# Patient Record
Sex: MALE | Race: WHITE | Employment: OTHER | URBAN - METROPOLITAN AREA
[De-identification: names, ages, dates, MRNs, and addresses within clinical notes are randomized per-mention and may not be internally consistent; named-entity substitution may affect disease eponyms.]

---

## 2017-07-20 ENCOUNTER — HOSPITAL ENCOUNTER (OUTPATIENT)
Dept: GENERAL RADIOLOGY | Age: 67
Discharge: HOME | End: 2017-07-20
Attending: FAMILY MEDICINE
Payer: MEDICARE

## 2017-07-20 DIAGNOSIS — M25.552 PAIN OF LEFT HIP JOINT: ICD-10-CM

## 2017-07-20 PROCEDURE — 73502 X-RAY EXAM HIP UNI 2-3 VIEWS: CPT | Performed by: FAMILY MEDICINE

## 2017-11-19 ENCOUNTER — APPOINTMENT (OUTPATIENT)
Dept: GENERAL RADIOLOGY | Age: 67
End: 2017-11-19
Attending: EMERGENCY MEDICINE
Payer: MEDICARE

## 2017-11-19 ENCOUNTER — HOSPITAL ENCOUNTER (EMERGENCY)
Age: 67
Discharge: HOME | End: 2017-11-19
Attending: EMERGENCY MEDICINE
Payer: MEDICARE

## 2017-11-19 VITALS
OXYGEN SATURATION: 97 % | DIASTOLIC BLOOD PRESSURE: 75 MMHG | SYSTOLIC BLOOD PRESSURE: 157 MMHG | BODY MASS INDEX: 31.83 KG/M2 | HEIGHT: 68 IN | RESPIRATION RATE: 16 BRPM | WEIGHT: 210 LBS | TEMPERATURE: 99 F | HEART RATE: 79 BPM

## 2017-11-19 DIAGNOSIS — S86.812A STRAIN OF CALF MUSCLE, LEFT, INITIAL ENCOUNTER: Primary | ICD-10-CM

## 2017-11-19 PROCEDURE — 73560 X-RAY EXAM OF KNEE 1 OR 2: CPT | Performed by: EMERGENCY MEDICINE

## 2017-11-19 PROCEDURE — 99283 EMERGENCY DEPT VISIT LOW MDM: CPT

## 2017-11-19 RX ORDER — TRAMADOL HYDROCHLORIDE 50 MG/1
50 TABLET ORAL EVERY 8 HOURS PRN
Qty: 15 TABLET | Refills: 0 | Status: SHIPPED | OUTPATIENT
Start: 2017-11-19 | End: 2018-11-07

## 2017-11-19 NOTE — ED PROVIDER NOTES
Patient Seen in: [facility] Emergency Department In [place]    History   Patient presents with:  Lower Extremity Injury (musculoskeletal)    Stated Complaint: l calf pain    HPI    Patient is a 33-year-old male who presents emergency room with history of Pulse 79   Temp 98.7 °F (37.1 °C) (Temporal)   Resp 16   Ht 172.7 cm (5' 8\")   Wt 95.3 kg   SpO2 97%   BMI 31.93 kg/m²         Physical Exam    GENERAL: Well-developed, well-nourished male sitting up breathing easily in no apparent distress.   Patient is Clinical Impression:  Strain of calf muscle, left, initial encounter  (primary encounter diagnosis)    Disposition:  Discharge  11/19/2017  1:40 pm    Follow-up:  Usman Pascal MD  [address] [place] [address]  [phone]

## 2017-12-11 ENCOUNTER — LAB ENCOUNTER (OUTPATIENT)
Dept: LAB | Age: 67
End: 2017-12-11
Attending: OTOLARYNGOLOGY
Payer: MEDICARE

## 2017-12-11 DIAGNOSIS — H90.5 CENTRAL HEARING LOSS: Primary | ICD-10-CM

## 2017-12-11 PROCEDURE — 84520 ASSAY OF UREA NITROGEN: CPT

## 2017-12-11 PROCEDURE — 82565 ASSAY OF CREATININE: CPT

## 2017-12-11 PROCEDURE — 36415 COLL VENOUS BLD VENIPUNCTURE: CPT

## 2017-12-19 ENCOUNTER — HOSPITAL ENCOUNTER (OUTPATIENT)
Dept: MRI IMAGING | Age: 67
Discharge: HOME | End: 2017-12-19
Attending: OTOLARYNGOLOGY
Payer: MEDICARE

## 2017-12-19 DIAGNOSIS — H90.3 SENSORINEURAL HEARING LOSS, ASYMMETRICAL: ICD-10-CM

## 2017-12-19 PROCEDURE — 70553 MRI BRAIN STEM W/O & W/DYE: CPT | Performed by: OTOLARYNGOLOGY

## 2017-12-19 PROCEDURE — A9575 INJ GADOTERATE MEGLUMI 0.1ML: HCPCS | Performed by: OTOLARYNGOLOGY

## 2018-01-19 ENCOUNTER — HOSPITAL ENCOUNTER (OUTPATIENT)
Dept: ULTRASOUND IMAGING | Age: 68
Discharge: HOME | End: 2018-01-19
Attending: FAMILY MEDICINE
Payer: MEDICARE

## 2018-01-19 ENCOUNTER — HOSPITAL ENCOUNTER (OUTPATIENT)
Dept: CV DIAGNOSTICS | Age: 68
Discharge: HOME | End: 2018-01-19
Attending: FAMILY MEDICINE
Payer: MEDICARE

## 2018-01-19 DIAGNOSIS — R55 SYNCOPE: ICD-10-CM

## 2018-01-19 PROCEDURE — 93880 EXTRACRANIAL BILAT STUDY: CPT | Performed by: FAMILY MEDICINE

## 2018-01-19 PROCEDURE — 93306 TTE W/DOPPLER COMPLETE: CPT | Performed by: FAMILY MEDICINE

## 2018-11-07 PROBLEM — M75.102 ROTATOR CUFF SYNDROME OF LEFT SHOULDER: Status: ACTIVE | Noted: 2018-11-07

## 2019-03-01 ENCOUNTER — APPOINTMENT (OUTPATIENT)
Dept: GENERAL RADIOLOGY | Age: 69
End: 2019-03-01
Attending: EMERGENCY MEDICINE
Payer: MEDICARE

## 2019-03-01 ENCOUNTER — HOSPITAL ENCOUNTER (EMERGENCY)
Age: 69
Discharge: HOME | End: 2019-03-01
Attending: EMERGENCY MEDICINE
Payer: MEDICARE

## 2019-03-01 VITALS
BODY MASS INDEX: 30 KG/M2 | SYSTOLIC BLOOD PRESSURE: 154 MMHG | WEIGHT: 209.44 LBS | OXYGEN SATURATION: 98 % | HEART RATE: 76 BPM | DIASTOLIC BLOOD PRESSURE: 69 MMHG | TEMPERATURE: 98 F | RESPIRATION RATE: 16 BRPM

## 2019-03-01 DIAGNOSIS — M10.9 ACUTE GOUT INVOLVING TOE OF RIGHT FOOT, UNSPECIFIED CAUSE: Primary | ICD-10-CM

## 2019-03-01 PROCEDURE — 73630 X-RAY EXAM OF FOOT: CPT | Performed by: EMERGENCY MEDICINE

## 2019-03-01 PROCEDURE — 99283 EMERGENCY DEPT VISIT LOW MDM: CPT | Performed by: EMERGENCY MEDICINE

## 2019-03-01 RX ORDER — PREDNISONE 20 MG/1
40 TABLET ORAL DAILY
Qty: 10 TABLET | Refills: 0 | Status: SHIPPED | OUTPATIENT
Start: 2019-03-01 | End: 2019-03-06

## 2019-03-01 RX ORDER — IBUPROFEN 600 MG/1
600 TABLET ORAL EVERY 8 HOURS PRN
Qty: 30 TABLET | Refills: 0 | Status: SHIPPED | OUTPATIENT
Start: 2019-03-01 | End: 2019-03-11

## 2019-03-01 NOTE — ED PROVIDER NOTES
Patient Seen in: [facility] In [place]    History   Patient presents with:  Lower Extremity Injury (musculoskeletal)    Stated Complaint: right toe and foot pain onset Monday with swelling    HPI    64-year-old male presents emergency scleral icterus, mucous membranes are moist, there is no erythema or exudate in the posterior pharynx  Neck: Supple no JVD no lymphadenopathy no meningismus no carotid bruit  CV: Regular rate and rhythm no murmur rub  Respiratory: Clear to auscultation rob R-0

## 2019-03-29 ENCOUNTER — OFFICE VISIT (OUTPATIENT)
Dept: INTERNAL MEDICINE CLINIC | Facility: CLINIC | Age: 69
End: 2019-03-29
Payer: MEDICARE

## 2019-03-29 ENCOUNTER — LAB ENCOUNTER (OUTPATIENT)
Dept: LAB | Age: 69
End: 2019-03-29
Attending: INTERNAL MEDICINE
Payer: MEDICARE

## 2019-03-29 VITALS
SYSTOLIC BLOOD PRESSURE: 130 MMHG | RESPIRATION RATE: 16 BRPM | TEMPERATURE: 98 F | HEIGHT: 67.25 IN | WEIGHT: 217.25 LBS | DIASTOLIC BLOOD PRESSURE: 68 MMHG | BODY MASS INDEX: 33.7 KG/M2 | HEART RATE: 78 BPM

## 2019-03-29 DIAGNOSIS — D64.9 ANEMIA, UNSPECIFIED TYPE: ICD-10-CM

## 2019-03-29 DIAGNOSIS — Z87.891 HISTORY OF SMOKING 25-50 PACK YEARS: ICD-10-CM

## 2019-03-29 DIAGNOSIS — Z12.11 SCREENING FOR MALIGNANT NEOPLASM OF COLON: ICD-10-CM

## 2019-03-29 DIAGNOSIS — D64.9 ANEMIA, UNSPECIFIED TYPE: Primary | ICD-10-CM

## 2019-03-29 LAB
BASOPHILS # BLD AUTO: 0.03 X10(3) UL (ref 0–0.2)
BASOPHILS NFR BLD AUTO: 0.4 %
DEPRECATED RDW RBC AUTO: 58.5 FL (ref 35.1–46.3)
EOSINOPHIL # BLD AUTO: 0.18 X10(3) UL (ref 0–0.7)
EOSINOPHIL NFR BLD AUTO: 2.1 %
ERYTHROCYTE [DISTWIDTH] IN BLOOD BY AUTOMATED COUNT: 17.4 % (ref 11–15)
HCT VFR BLD AUTO: 38.7 % (ref 39–53)
HGB BLD-MCNC: 13.7 G/DL (ref 13–17.5)
IMM GRANULOCYTES # BLD AUTO: 0.07 X10(3) UL (ref 0–1)
IMM GRANULOCYTES NFR BLD: 0.8 %
LYMPHOCYTES # BLD AUTO: 2.25 X10(3) UL (ref 1–4)
LYMPHOCYTES NFR BLD AUTO: 26.4 %
MCH RBC QN AUTO: 33 PG (ref 26–34)
MCHC RBC AUTO-ENTMCNC: 35.4 G/DL (ref 31–37)
MCV RBC AUTO: 93.3 FL (ref 80–100)
MONOCYTES # BLD AUTO: 0.7 X10(3) UL (ref 0.1–1)
MONOCYTES NFR BLD AUTO: 8.2 %
NEUTROPHILS # BLD AUTO: 5.3 X10 (3) UL (ref 1.5–7.7)
NEUTROPHILS # BLD AUTO: 5.3 X10(3) UL (ref 1.5–7.7)
NEUTROPHILS NFR BLD AUTO: 62.1 %
PLATELET # BLD AUTO: 233 10(3)UL (ref 150–450)
RBC # BLD AUTO: 4.15 X10(6)UL (ref 3.8–5.8)
WBC # BLD AUTO: 8.5 X10(3) UL (ref 4–11)

## 2019-03-29 PROCEDURE — 99203 OFFICE O/P NEW LOW 30 MIN: CPT | Performed by: INTERNAL MEDICINE

## 2019-03-29 PROCEDURE — 36415 COLL VENOUS BLD VENIPUNCTURE: CPT

## 2019-03-29 PROCEDURE — 85025 COMPLETE CBC W/AUTO DIFF WBC: CPT

## 2019-03-29 NOTE — PROGRESS NOTES
[facility] Internal Medicine Office Note  Chief Complaint:   Patient presents with:  Establish Care  Shoulder Pain: L shoulder pain       HPI:   This is a 77year old male coming in for establishing care  HPI  His old primary doctor was a geriatr Outpatient Medications:  Multiple Vitamins-Minerals (PRESERVISION AREDS 2+MULTI VIT OR) PreserVision AREDS Disp:  Rfl:          REVIEW OF SYSTEMS:   Review of Systems   Constitutional: Negative for fever. Respiratory: Negative for shortness of breath. appt or drop off at office.   -     CBC WITH DIFFERENTIAL WITH PLATELET;  Future    Screening for malignant neoplasm of colon -last colonoscopy was a few years ago but he thinks he might be coming due for follow up due to polyps   -     GASTRO - INTERNAL

## 2019-03-29 NOTE — PATIENT INSTRUCTIONS
Blood work     Prevnar pneumonia vaccine recommended (then Pneumovax pneumonia is recommended a year later)     Shingrix shingles vaccine recommended - 2nd dose is 2-6 months after the first dose     Consider lung CT for screening for lung cancer

## 2019-06-04 ENCOUNTER — TELEPHONE (OUTPATIENT)
Dept: INTERNAL MEDICINE CLINIC | Facility: CLINIC | Age: 69
End: 2019-06-04

## 2019-06-05 ENCOUNTER — HOSPITAL ENCOUNTER (OUTPATIENT)
Dept: CT IMAGING | Age: 69
Discharge: HOME | End: 2019-06-05
Attending: INTERNAL MEDICINE
Payer: MEDICARE

## 2019-06-05 DIAGNOSIS — Z87.891 HISTORY OF SMOKING 25-50 PACK YEARS: ICD-10-CM

## 2019-07-11 ENCOUNTER — PATIENT MESSAGE (OUTPATIENT)
Dept: INTERNAL MEDICINE CLINIC | Facility: CLINIC | Age: 69
End: 2019-07-11

## 2019-07-18 ENCOUNTER — OFFICE VISIT (OUTPATIENT)
Dept: INTERNAL MEDICINE CLINIC | Facility: CLINIC | Age: 69
End: 2019-07-18
Payer: MEDICARE

## 2019-07-18 VITALS
SYSTOLIC BLOOD PRESSURE: 130 MMHG | DIASTOLIC BLOOD PRESSURE: 72 MMHG | TEMPERATURE: 98 F | RESPIRATION RATE: 16 BRPM | HEIGHT: 67.25 IN | WEIGHT: 217 LBS | OXYGEN SATURATION: 98 % | BODY MASS INDEX: 33.66 KG/M2 | HEART RATE: 75 BPM

## 2019-07-18 DIAGNOSIS — R42 VERTIGO: ICD-10-CM

## 2019-07-18 DIAGNOSIS — R73.01 ELEVATED FASTING GLUCOSE: Primary | ICD-10-CM

## 2019-07-18 PROBLEM — I73.9 PVD (PERIPHERAL VASCULAR DISEASE) (HCC): Status: ACTIVE | Noted: 2018-02-02

## 2019-07-18 PROBLEM — Z95.0 PRESENCE OF CARDIAC PACEMAKER: Status: ACTIVE | Noted: 2018-02-14

## 2019-07-18 PROBLEM — I77.9 CAROTID DISEASE, BILATERAL (HCC): Status: ACTIVE | Noted: 2018-02-02

## 2019-07-18 PROBLEM — I73.9 PVD (PERIPHERAL VASCULAR DISEASE): Status: ACTIVE | Noted: 2018-02-02

## 2019-07-18 PROBLEM — I45.2 RBBB (RIGHT BUNDLE BRANCH BLOCK WITH LEFT ANTERIOR FASCICULAR BLOCK): Status: ACTIVE | Noted: 2018-02-02

## 2019-07-18 PROBLEM — I77.9 CAROTID DISEASE, BILATERAL: Status: ACTIVE | Noted: 2018-02-02

## 2019-07-18 PROBLEM — H81.09 MENIERE'S DISEASE: Status: ACTIVE | Noted: 2018-09-01

## 2019-07-18 LAB
CARTRIDGE LOT#: 219 NUMERIC
HEMOGLOBIN A1C: 6 % (ref 4.3–5.6)

## 2019-07-18 PROCEDURE — 83036 HEMOGLOBIN GLYCOSYLATED A1C: CPT | Performed by: INTERNAL MEDICINE

## 2019-07-18 PROCEDURE — 99213 OFFICE O/P EST LOW 20 MIN: CPT | Performed by: INTERNAL MEDICINE

## 2019-07-18 NOTE — PATIENT INSTRUCTIONS
Call today to make appointment with [facility] specialist Dr. Shaista Engle in [place] [phone] [address]  [place]  [address]      Low carb diet: decrease bread, rice, pasta, potatoes, juice, soda, alcohol, crack

## 2019-07-18 NOTE — PROGRESS NOTES
[facility] Internal Medicine Office Note  Chief Complaint:   Patient presents with:  Vertigo: patient c/o vertigo on and off x 3 months, c/o vomiting        HPI:   This is a 68year old male coming in for vertigo   HPI  Lasts for 1-2 hours  7 eps Cardiovascular: Negative for chest pain. Gastrointestinal: Negative for constipation. Neurological: Positive for dizziness. Hematological: Negative. Psychiatric/Behavioral: Negative.          EXAM:   /72   Pulse 75   Temp 98.2 °F (36.8 °C) Outcome: Patient verbalizes understanding. Patient is notified to call with any questions, complications, allergies, or worsening or changing symptoms. Patient is to call with any side effects or complications from the treatments as a result of today.

## 2019-09-11 ENCOUNTER — LAB ENCOUNTER (OUTPATIENT)
Dept: LAB | Age: 69
End: 2019-09-11
Attending: OTOLARYNGOLOGY
Payer: MEDICARE

## 2019-09-11 DIAGNOSIS — H81.02 MENIERE'S DISEASE OF LEFT EAR: Primary | ICD-10-CM

## 2019-09-11 LAB
ANION GAP SERPL CALC-SCNC: 6 MMOL/L (ref 0–18)
BUN BLD-MCNC: 24 MG/DL (ref 7–18)
BUN/CREAT SERPL: 18 (ref 10–20)
CALCIUM BLD-MCNC: 8.7 MG/DL (ref 8.5–10.1)
CHLORIDE SERPL-SCNC: 102 MMOL/L (ref 98–112)
CO2 SERPL-SCNC: 33 MMOL/L (ref 21–32)
CREAT BLD-MCNC: 1.33 MG/DL (ref 0.7–1.3)
GLUCOSE BLD-MCNC: 169 MG/DL (ref 70–99)
OSMOLALITY SERPL CALC.SUM OF ELEC: 300 MOSM/KG (ref 275–295)
POTASSIUM SERPL-SCNC: 3.8 MMOL/L (ref 3.5–5.1)
SODIUM SERPL-SCNC: 141 MMOL/L (ref 136–145)

## 2019-09-11 PROCEDURE — 36415 COLL VENOUS BLD VENIPUNCTURE: CPT

## 2019-09-11 PROCEDURE — 80048 BASIC METABOLIC PNL TOTAL CA: CPT

## 2019-09-17 ENCOUNTER — TELEPHONE (OUTPATIENT)
Dept: INTERNAL MEDICINE CLINIC | Facility: CLINIC | Age: 69
End: 2019-09-17

## 2019-09-17 NOTE — TELEPHONE ENCOUNTER
Patient outreach spoke to patient and he states he will call by end of the week; he was wanting to call to discuss lab results that concern him from his ENT.  Informed him to call to schedule AMW 2019

## 2019-09-23 ENCOUNTER — PATIENT MESSAGE (OUTPATIENT)
Dept: INTERNAL MEDICINE CLINIC | Facility: CLINIC | Age: 69
End: 2019-09-23

## 2019-09-23 DIAGNOSIS — N17.9 ACUTE KIDNEY INJURY (HCC): Primary | ICD-10-CM

## 2019-10-04 ENCOUNTER — APPOINTMENT (OUTPATIENT)
Dept: LAB | Age: 69
End: 2019-10-04
Attending: INTERNAL MEDICINE
Payer: MEDICARE

## 2019-10-04 DIAGNOSIS — N17.9 ACUTE KIDNEY INJURY (HCC): ICD-10-CM

## 2019-10-04 PROCEDURE — 80048 BASIC METABOLIC PNL TOTAL CA: CPT

## 2019-10-04 PROCEDURE — 36415 COLL VENOUS BLD VENIPUNCTURE: CPT

## 2019-10-08 ENCOUNTER — OFFICE VISIT (OUTPATIENT)
Dept: INTERNAL MEDICINE CLINIC | Facility: CLINIC | Age: 69
End: 2019-10-08
Payer: MEDICARE

## 2019-10-08 VITALS
HEIGHT: 67.25 IN | DIASTOLIC BLOOD PRESSURE: 62 MMHG | SYSTOLIC BLOOD PRESSURE: 112 MMHG | WEIGHT: 206.25 LBS | HEART RATE: 80 BPM | TEMPERATURE: 98 F | RESPIRATION RATE: 16 BRPM | BODY MASS INDEX: 31.99 KG/M2

## 2019-10-08 DIAGNOSIS — Z00.00 WELLNESS EXAMINATION: Primary | ICD-10-CM

## 2019-10-08 DIAGNOSIS — R42 VERTIGO: ICD-10-CM

## 2019-10-08 DIAGNOSIS — Z12.5 SCREENING FOR PROSTATE CANCER: ICD-10-CM

## 2019-10-08 DIAGNOSIS — R73.01 ELEVATED FASTING GLUCOSE: ICD-10-CM

## 2019-10-08 DIAGNOSIS — Z51.81 ENCOUNTER FOR MEDICATION MONITORING: ICD-10-CM

## 2019-10-08 PROCEDURE — G0439 PPPS, SUBSEQ VISIT: HCPCS | Performed by: INTERNAL MEDICINE

## 2019-10-08 RX ORDER — CHLORHEXIDINE GLUCONATE 0.12 MG/ML
RINSE ORAL
COMMUNITY
End: 2020-06-18 | Stop reason: ALTCHOICE

## 2019-10-08 RX ORDER — ANTIOX #8/OM3/DHA/EPA/LUT/ZEAX 250-2.5 MG
CAPSULE ORAL
COMMUNITY
End: 2020-02-06

## 2019-10-08 RX ORDER — TRIAMTERENE AND HYDROCHLOROTHIAZIDE 37.5; 25 MG/1; MG/1
CAPSULE ORAL
COMMUNITY
End: 2020-11-13

## 2019-10-08 NOTE — PROGRESS NOTES
REASON FOR VISIT:    Mona Su is a 68year old male who presents for a Medicare Annual Wellness visit. Vertigo - improved with triamterene-hctz.  No episodes in the month of Oct  He has constant tinnitus which is bothersome     He has been feeling 63 U/L 28 30             General Health      In the past six months, have you lost more than 10 pounds without trying?: (P) 2 - No  Has your appetite been poor?: (P) Yes  Type of Diet: (P) Balanced  How does the patient maintain a good energy level?: Daily all  Feeling down, depressed, or hopeless (over the last two weeks)?: Not at all  PHQ-2 SCORE: 0        Advance Directives     Do you have a healthcare power of ?: (P) Yes  Do you have a living will?: (P) Yes     Cognitive Assessment     What day o DENTAL SURGERY PROCEDURE  2009    dental implants   • HERNIA SURGERY Right 1973   • HERNIA SURGERY Bilateral 2001   • LASIK Bilateral 2003   • REPAIR ING HERNIA,5+Y/O,REDUCIBL     • REPAIR OF NASAL SEPTUM N/A 2003      History reviewed.  No pertinent family lesions  HEENT: atraumatic, normocephalic, ears and throat are clear                Hearing Assessed via: Finger Rub  EYES:  conjunctiva are clear   CHEST: no chest tenderness  LUNGS: clear to auscultation  CARDIO: RRR without murmur  GI: good BS's, no mas

## 2019-10-25 ENCOUNTER — APPOINTMENT (OUTPATIENT)
Dept: LAB | Age: 69
End: 2019-10-25
Attending: INTERNAL MEDICINE
Payer: MEDICARE

## 2019-10-25 DIAGNOSIS — Z51.81 ENCOUNTER FOR MEDICATION MONITORING: ICD-10-CM

## 2019-10-25 DIAGNOSIS — Z12.5 SCREENING FOR PROSTATE CANCER: ICD-10-CM

## 2019-10-25 DIAGNOSIS — R73.01 ELEVATED FASTING GLUCOSE: ICD-10-CM

## 2019-10-25 PROCEDURE — 36415 COLL VENOUS BLD VENIPUNCTURE: CPT

## 2019-10-25 PROCEDURE — 80048 BASIC METABOLIC PNL TOTAL CA: CPT

## 2019-10-25 PROCEDURE — 83036 HEMOGLOBIN GLYCOSYLATED A1C: CPT

## 2019-10-28 DIAGNOSIS — N17.9 ACUTE KIDNEY INJURY (HCC): Primary | ICD-10-CM

## 2019-11-07 ENCOUNTER — APPOINTMENT (OUTPATIENT)
Dept: LAB | Age: 69
End: 2019-11-07
Attending: INTERNAL MEDICINE
Payer: MEDICARE

## 2019-11-07 DIAGNOSIS — N17.9 ACUTE KIDNEY INJURY (HCC): ICD-10-CM

## 2019-11-07 DIAGNOSIS — N17.9 ACUTE KIDNEY INJURY (HCC): Primary | ICD-10-CM

## 2019-11-07 PROCEDURE — 36415 COLL VENOUS BLD VENIPUNCTURE: CPT

## 2019-11-07 PROCEDURE — 80048 BASIC METABOLIC PNL TOTAL CA: CPT

## 2020-01-29 ENCOUNTER — PATIENT MESSAGE (OUTPATIENT)
Dept: INTERNAL MEDICINE CLINIC | Facility: CLINIC | Age: 70
End: 2020-01-29

## 2020-01-29 DIAGNOSIS — R73.01 ELEVATED FASTING GLUCOSE: Primary | ICD-10-CM

## 2020-01-29 NOTE — TELEPHONE ENCOUNTER
**See Addendum**
General Information and HPI
 
Consulting Request
Date of Consult: 09/11/18
Requested By:
Neftaly Eddy MD
 
Reason for Consult:
lightheadedness
Source of Information: patient, old records, ED physician
Exam Limitations: no limitations
History of Present Illness:
73-year-old male past medical history coronary artery disease status post CABG 
4 (1989), ischemic cardiomyopathy (LVEF 25-30% 8/27/2018) s/p Medtronic ICD with
no history of ICD shocks last interrogation 8/30/2018 showing no ventricular 
arrhythmias, mild to moderate aortic stenosis, significant peripheral vascular 
disease with AAA status post EVAR, carotid stenosis s/p right ICA CEA with total
occlusion of left ICA, two stents in right groin, HTN, HLD, COPD, squamous cell 
lung cancer s/p radiation and recently completed course of prednisone, prior b/l
pleural effusions, hypothyroidism presents after episode of sudden onset 
lightheadedness.  Patient reports that he had just gotten into the shower when 
he became suddenly lightheaded.  He denies associated chest pain, palpitations, 
dyspnea.  He denies fall or loss of consciousness.  The symptoms were transient 
and resolved and the patient sat down.  He did report a discomfort in his left 
shoulder and a "fluttering feeling in his neck."  The patient is a very minimal 
exercise tolerance at baseline, can cross her room and become short of breath, 
however this is unchanged.  He denies worsening orthopnea, PND, reports that 
previous lower extremity swelling has improved.  At the time of this history the
patient denies symptoms while at rest.
 
The patient is compliant with all medications.  He also noted that on 2 
occasions, including this morning, when he raises his arms above his head he 
feels that both of his lower extremities become numb.  The symptoms have 
resolved by the time of this history.
 
Allergies/Medications
Allergies:
Coded Allergies:
No Known Allergies (09/08/18)
 
Home Med List:
Acetaminophen 325 MG CAPSULE   1 CAP PO PRN PAIN  (Reported)
Albuterol Sulfate (Proair Hfa) 90 MCG HFA.AER.AD   2 PUF INH Q4 COPD
Aspirin (Aspirin*) 81 MG TAB.CHEW   1 TAB PO DAILY heart  (Reported)
Atorvastatin Calcium (Lipitor) 40 MG TABLET   1 TAB PO DAILY cholesterol  (
Reported)
Budesonide/Formoterol Fumarate (Symbicort 160-4.5 Mcg Inhaler) 160 MCG-4.5 MCG/
ACTUATION HFA.AER.AD   2 PUF INH BID COPD  (Reported)
Carvedilol 6.25 MG TABLET   1 TAB PO BID HEART/BP  (Reported)
Clopidogrel Bisulfate (Clopidogrel) 75 MG TABLET   1 TAB PO DAILY BLOOD THINNER 
(Reported)
Empagliflozin (Jardiance) 10 MG TABLET   1 TAB PO DAILY DM  (Reported)
Furosemide (Lasix) 20 MG TABLET   3 TAB PO BID HEART  (Reported)
     .
Insulin Aspart, Recombinant (Novolog Flexpen) 100 UNIT/ML INSULN.PEN   0 SC SEE 
ADMIN CRITERIA DM
     PLEASE check your blood sugar 3 times daily before meals and at
     bedtime and .
     FOLLOW
     80 - 150 MG/DL   9 UNITS
     151- 200 MG/DL   11 UNITS
     201- 250 MG/DL   13 UNITS
     251- 300 MG/DL   15 UNITS
     301- 350 MG/DL   17 UNITS
     351 - 400MG/DL   19 UNITS
     >400 MG/DL       20 UNITS AND CALL YOUR DR.
Insulin Detemir (Levemir) 100 UNIT/ML VIAL   25 UNITS SC QAM DM  (Reported)
Levothyroxine Sodium 100 MCG TABLET   1.5 TAB PO DAILY HYPOTHYROID  (Reported)
Nitroglycerin (Nitrostat) 0.4 MG TAB.SUBL   1 TAB SL AD PRN CHEST PAIN  (
Reported)
     1st sign of attack; may repeat every 5 minutes until relief; if pain 
persists after 3 tablets in 15 minutes, prompt medical att
Omeprazole 40 MG CAPSULE.DR   1 CAP PO DAILY GI  (Reported)
Ranolazine (Ranexa) 500 MG TAB.ER.12H   1 TAB PO BID heart  (Reported)
Sertraline HCl 50 MG TABLET   1 TAB PO DAILY MENTAL HEALTH  (Reported)
Tamsulosin HCl (Flomax) 0.4 MG CAP.ER.24H   1 CAP PO QHS prostate
     Please take at bed time to avoid low blood pressure during the day.
Tiotropium Bromide (Spiriva) 18 MCG CAP.W.DEV   1 CAP INH DAILY COPD  (Reported)
 
Current Medications:
 Current Medications
 
 
  Sig/Ly Start time  Last
 
Medication Dose Route Stop Time Status Admin
 
Aspirin 0 .STK-MED ONE 09/11 0652 DC 
 
  PO   
 
Aspirin 325 MG ONCE ONE 09/11 0645 DC 09/11
 
  PO 09/11 0646  0652
 
 
 
 
Review of Systems
Review of Systems:
As per HPI.  Otherwise a 10 point review systems was negative.
 
Past History
 
Travel History
Traveled to [place] past 21 day No
 
Medical History
Neurological: NONE
EENT: NONE
Cardiovascular: CARDIAC STENTS CABG CAROTID ARTERY BYPASS DEFIBRILLATOR
Respiratory: COPD
Gastrointestinal: NONE
Hepatic: NONE
Renal: NONE
Musculoskeletal: NONE
Psychiatric: NONE
Endocrine: hypothyroidism, INSULIN DEP DIABETIC
Blood Disorders: DVT
Cancer(s): lung cancer
GYN/Reproductive: NONE
 
Surgical History
Surgical History: CABG, CAROTID ARTECTOMY nerve thermoablation to reduce back 
pain 
 
Family History
Relations & Conditions If Any:
Relation not specified for:
  *No pertinent family history
 
 
Psychosocial History
Who Do You Live With? spouse
Services at Home: None
Primary Language: English
Living Will? no
 
Functional Ability
ADLs
Independent: dressing, eating, toileting, bathing. 
Ambulation: independent
 
ECHO Results (as available)
Report:
Left ventricular cavity size normal. Left ventricular wall thickness
 mildly increased. Moderate to severe hypokinesis of the mid to basal
 inferior wall. Mild distal anteroseptal/anteroapical hypokinesis.
 Left ventricular ejection fraction is estimated at 45 %.
 Catheter/pacemaker wire in the right ventricular cavity. Normal
 right ventricular size and function.
 Mild left atrial dilatation.
 Moderate mitral regurgitation.
 Mild-to-moderate aortic stenosis (MICHELLE 1.4 cm2 by continuity with a
 mean gradient of 13 mmHg).
 Moderate tricuspid regurgitation. RVSP > 45 mmHg.
 
 
Exam & Diagnostic Data
Vital Signs and I&O
Vital Signs
 
 
Date Time Temp Pulse Resp B/P B/P Pulse O2 O2 Flow FiO2
 
     Mean Ox Delivery Rate 
 
09/11 1253    109/64     
 
09/11 1251    112/64     
 
09/11 1249 97.9 92 18 119/67  97 Room Air  
 
09/11 1139 97.9 96 18 124/57  95 Room Air  
 
09/11 0853 97.8 88 18 115/67  97 Room Air  
 
09/11 0741       Room Air  
 
09/11 0635  89 22 106/70  97 Room Air  
 
 
 Intake & Output
 
 
 09/11 1600 09/11 0800 09/11 0000 09/10 1600 09/10 0800 09/10 0000
 
Intake Total      
 
Output Total      
 
Balance      
 
       
 
Patient  104.326 kg    
 
Weight      
 
Weight  Reported by Patient    
 
Measurement      
 
Method      
 
 
 
Physical Exam:
General: elderly male, lying in stretcher, no apparent distress
HEENT: NCAT, NO JVD, difficult to appreciate carotid pulsations
Heart: distant heart sounds, s1s2, RRR, 2/6 systolic murmur at the base
Lungs: mild decreased bibasilar breath sounds, fair air entry b/l, scattered 
faint exp wheezes, no rales/rhonchi
Chest: ICD in place
Abd: soft, nt
Ext: trace b/l LE pitting edema to mid shins, chronic bilateral skin lower 
extremity changes
Labs/K[place]il Results:
 Laboratory Tests
 
 
 09/11
 
 0644
 
Chemistry 
 
  Sodium (137 - 145 mmol/L) 139
 
  Potassium (3.5 - 5.1 mmol/L) 3.8
 
  Chloride (98 - 107 mmol/L) 101
 
  Carbon Dioxide (22 - 30 mmol/L) 30
 
  Anion Gap (5 - 16) 8
 
  BUN (9 - 20 mg/dL) 27  H
 
  Creatinine (0.7 - 1.2 mg/dL) 1.7  H
 
  Estimated GFR (>60 ml/min) 39  L
 
  BUN/Creatinine Ratio (7 - 25 %) 15.9
 
  Glucose (65 - 99 mg/dL) 168  H
 
  Calcium (8.4 - 10.2 mg/dL) 9.0
 
  Magnesium (1.6 - 2.3 mg/dL) 1.6
 
  Total Bilirubin (0.2 - 1.3 mg/dL) 0.3
 
  AST (17 - 59 U/L) 23
 
  ALT (21 - 72 U/L) 42
 
  Alkaline Phosphatase (< 127 U/L) 126
 
  Troponin I (<0.11 ng/ml) 0.07
 
  Total Protein (6.3 - 8.2 g/dL) 5.4  L
 
  Albumin (3.5 - 5.0 g/dL) 3.2  L
 
  Globulin (1.9 - 4.2 gm/dL) 2.2
 
  Albumin/Globulin Ratio (1.1 - 2.2 %) 1.5
 
  Cholesterol (< 200 MG/DL) 141
 
Coagulation 
 
  PT (9.4 - 12.5 SEC) 12.5
 
  INR (0.90 - 1.17) 1.15
 
Hematology 
 
  CBC w Diff NO MAN DIFF REQ
 
  WBC (4.8 - 10.8 /CUMM) 5.8
 
  RBC (4.70 - 6.10 /CUMM) 3.33  L
 
  Hgb (14.0 - 18.0 G/DL) 11.4  L
 
  Hct (42 - 52 %) 33.5  L
 
  MCV (80.0 - 94.0 FL) 100.7  H
 
  MCH (27.0 - 31.0 PG) 34.3  H
 
  MCHC (33.0 - 37.0 G/DL) 34.1
 
  RDW (11.5 - 14.5 %) 15.6  H
 
  Plt Count (130 - 400 /CUMM) 134
 
  MPV (7.4 - 10.4 FL) 7.8
 
  Gran % (42.2 - 75.2 %) 75.4  H
 
  Lymphocytes % (20.5 - 51.1 %) 11.1  L
 
  Monocytes % (1.7 - 9.3 %) 11.1  H
 
  Eosinophils % (0 - 5 %) 2.4
 
  Basophils % (0.0 - 2.0 %) 0
 
  Absolute Granulocytes (1.4 - 6.5 /CUMM) 4.3
 
  Absolute Lymphocytes (1.2 - 3.4 /CUMM) 0.6  L
 
  Absolute Monocytes (0.10 - 0.60 /CUMM) 0.6
 
  Absolute Eosinophils (0.0 - 0.7 /CUMM) 0.1
 
  Absolute Basophils (0.0 - 0.2 /CUMM) 0
 
 
 
 
Diagnostic Data
EKG Results
Personally reviewed
sinus rhythm w PVC, nonspecific IVCD, nonspecific Tw abnormalities
 
grossly unchanged from previous tracing
CXR Results
Evidence of previous cardiac surgery. AICD in place grossly stable.
Progressive airspace disease left base consistent with presumably
pneumonitis. Underlying layering pleural effusion suspected. Hazy density
right midlung laterally favor summation shadow. No ectopic air. No overt CHF.
Heart size remains mildly enlarged.
Other Results
CT chest
1. Patient's known left lower lobe cancer is unchanged when compared to most
recent prior exam.
2. Interval slight decrease in size of the left-sided pleural effusion and
associated volume loss and presumed atelectatic changes in the left lower
lobe and lingula.
3. Right-sided pleural effusion has completely resolved. Background of mild
emphysema remains.
4. Distal thoracic and abdominal aortic aneurysm, unchanged. Abdominal aortic
stent graft is partially imaged.
5. Severe atherosclerotic coronary artery calcification. Patient is status
post median sternotomy and CABG surgery.
6. Right renal cysts.
 
TTE 8/27/2018
 Limited study. Moderate to severely reduced left ventricular systolic function.
Mild left ventricular dilatation. Left ventricular wall thickness  
 mildly increased. Moderately abnormal left ventricular ejection  fraction 
estimated at 25-30%. Akinetic inferior wall.  Moderate Aortic stenosis. 
 
Assessment/Plan
Assessment/Plan
1. lightheadedness, transient
2. CAD s/p CABG x 4 (1989)
3. Ischemic cardiomyopathy (LVEF 25-30% 8/2018) s/p Medtronic single chamber ICD
4. PAD w AAA s/p EVAR, b/l carotid stenosis s/p R CEA and L ICA total occlusion 
5. mild-moderate aortic stenosis
6. HTN
7. HLD
8. COPD, severe
9. DM
10. hypothyroidism
11. squamous cell lung cancer
12. pleural effuions, resolution of right pleural effion and decrease in left 
pleural effusion
13. CKD, creatinine at baseline
 
Patient's presentation is atypical for ACS.  BP is stable.  The patient does not
appear to be in acute heart failure on clinical exam. No acute ischemic changes 
on ECG. Initial troponin negative. No pericardial effusion on CT chest.  
Etiologies for his lightheadedness include tachyarrhythmia, orthostasis, 
vasovagal event, less likely neurologic event such as TIA/CVA, however this 
should be considered given known carotid disease. 
 
Medtronic rep will be called to interrogate ICD.  Check orthostatics. 
 
Otherwise continue with current medication regimen including aspirin, Plavix, 
carvedilol, Ranexa, atorvastatin, Lasix.
 
 
Consult Acknowledgment
- Thank you for your consult request. Pt has BMP ordered to be completed around 2/7/2020. Are there any other labs you would like pt to have completed before scheduled appointment?  TY

## 2020-01-29 NOTE — TELEPHONE ENCOUNTER
From: Leilani Beauchamp  To: Adeline Rae MD  Sent: 1/29/2020 9:27 AM CST  Subject: Non-Urgent Medical Question    I have an appointment scheduled with you for next Thursday, February 6th. Should I come in to have blood drawn prior to our appointment?  If so,

## 2020-02-04 ENCOUNTER — APPOINTMENT (OUTPATIENT)
Dept: LAB | Age: 70
End: 2020-02-04
Attending: INTERNAL MEDICINE
Payer: MEDICARE

## 2020-02-04 DIAGNOSIS — R73.01 ELEVATED FASTING GLUCOSE: ICD-10-CM

## 2020-02-04 DIAGNOSIS — N17.9 ACUTE KIDNEY INJURY (HCC): ICD-10-CM

## 2020-02-04 LAB
ANION GAP SERPL CALC-SCNC: 5 MMOL/L (ref 0–18)
BUN BLD-MCNC: 25 MG/DL (ref 7–18)
BUN/CREAT SERPL: 16.9 (ref 10–20)
CALCIUM BLD-MCNC: 9 MG/DL (ref 8.5–10.1)
CHLORIDE SERPL-SCNC: 101 MMOL/L (ref 98–112)
CO2 SERPL-SCNC: 31 MMOL/L (ref 21–32)
CREAT BLD-MCNC: 1.48 MG/DL (ref 0.7–1.3)
EST. AVERAGE GLUCOSE BLD GHB EST-MCNC: 140 MG/DL (ref 68–126)
GLUCOSE BLD-MCNC: 146 MG/DL (ref 70–99)
HBA1C MFR BLD HPLC: 6.5 % (ref ?–5.7)
OSMOLALITY SERPL CALC.SUM OF ELEC: 291 MOSM/KG (ref 275–295)
PATIENT FASTING Y/N/NP: YES
POTASSIUM SERPL-SCNC: 4 MMOL/L (ref 3.5–5.1)
SODIUM SERPL-SCNC: 137 MMOL/L (ref 136–145)

## 2020-02-04 PROCEDURE — 83036 HEMOGLOBIN GLYCOSYLATED A1C: CPT

## 2020-02-04 PROCEDURE — 80048 BASIC METABOLIC PNL TOTAL CA: CPT

## 2020-02-04 PROCEDURE — 36415 COLL VENOUS BLD VENIPUNCTURE: CPT

## 2020-02-06 ENCOUNTER — OFFICE VISIT (OUTPATIENT)
Dept: INTERNAL MEDICINE CLINIC | Facility: CLINIC | Age: 70
End: 2020-02-06
Payer: MEDICARE

## 2020-02-06 ENCOUNTER — HOSPITAL ENCOUNTER (OUTPATIENT)
Dept: GENERAL RADIOLOGY | Age: 70
Discharge: HOME | End: 2020-02-06
Attending: INTERNAL MEDICINE
Payer: MEDICARE

## 2020-02-06 VITALS
HEIGHT: 67.25 IN | BODY MASS INDEX: 32.85 KG/M2 | WEIGHT: 211.75 LBS | HEART RATE: 110 BPM | RESPIRATION RATE: 16 BRPM | DIASTOLIC BLOOD PRESSURE: 64 MMHG | OXYGEN SATURATION: 92 % | TEMPERATURE: 98 F | SYSTOLIC BLOOD PRESSURE: 130 MMHG

## 2020-02-06 DIAGNOSIS — N17.9 ACUTE KIDNEY INJURY (HCC): ICD-10-CM

## 2020-02-06 DIAGNOSIS — R06.2 WHEEZING: ICD-10-CM

## 2020-02-06 DIAGNOSIS — R09.02 HYPOXIA: ICD-10-CM

## 2020-02-06 DIAGNOSIS — R42 VERTIGO: ICD-10-CM

## 2020-02-06 DIAGNOSIS — R06.2 WHEEZING: Primary | ICD-10-CM

## 2020-02-06 DIAGNOSIS — E11.9 TYPE 2 DIABETES MELLITUS WITHOUT COMPLICATION, WITHOUT LONG-TERM CURRENT USE OF INSULIN (HCC): ICD-10-CM

## 2020-02-06 PROCEDURE — 99214 OFFICE O/P EST MOD 30 MIN: CPT | Performed by: INTERNAL MEDICINE

## 2020-02-06 PROCEDURE — 71046 X-RAY EXAM CHEST 2 VIEWS: CPT | Performed by: INTERNAL MEDICINE

## 2020-02-06 PROCEDURE — 94640 AIRWAY INHALATION TREATMENT: CPT | Performed by: INTERNAL MEDICINE

## 2020-02-06 RX ORDER — TRIAMTERENE AND HYDROCHLOROTHIAZIDE 37.5; 25 MG/1; MG/1
CAPSULE ORAL
Qty: 90 CAPSULE | Refills: 0 | Status: CANCELLED | OUTPATIENT
Start: 2020-02-06

## 2020-02-06 RX ORDER — IPRATROPIUM BROMIDE AND ALBUTEROL SULFATE 2.5; .5 MG/3ML; MG/3ML
3 SOLUTION RESPIRATORY (INHALATION) ONCE
Status: COMPLETED | OUTPATIENT
Start: 2020-02-06 | End: 2020-02-06

## 2020-02-06 RX ORDER — AZITHROMYCIN 250 MG/1
TABLET, FILM COATED ORAL
Qty: 6 TABLET | Refills: 0 | Status: SHIPPED | OUTPATIENT
Start: 2020-02-06 | End: 2020-02-13 | Stop reason: ALTCHOICE

## 2020-02-06 RX ORDER — METHYLPREDNISOLONE 4 MG/1
TABLET ORAL
Qty: 1 PACKAGE | Refills: 0 | Status: SHIPPED | OUTPATIENT
Start: 2020-02-06 | End: 2020-02-13 | Stop reason: ALTCHOICE

## 2020-02-06 RX ORDER — ALBUTEROL SULFATE 90 UG/1
2 AEROSOL, METERED RESPIRATORY (INHALATION) EVERY 4 HOURS PRN
Qty: 1 INHALER | Refills: 0 | Status: SHIPPED | OUTPATIENT
Start: 2020-02-06 | End: 2020-11-13

## 2020-02-06 RX ADMIN — IPRATROPIUM BROMIDE AND ALBUTEROL SULFATE 3 ML: 2.5; .5 SOLUTION RESPIRATORY (INHALATION) at 08:45:00

## 2020-02-06 NOTE — PATIENT INSTRUCTIONS
X-ray now      Low carb diet - decrease bread, rice, pasta, potatoes, juice, soda, alcohol, chips, crackers, tortillas, desserts     Repeat a1c in 3 months     Decrease triamterene-hctz dosing to every other day    Repeat blood work in 2-3 weeks - week of

## 2020-02-06 NOTE — PROGRESS NOTES
[facility] Internal Medicine Office Note  Chief Complaint:   Patient presents with:   Follow - Up: 4 month follow up and b/p  Wheezing      HPI:   This is a 68year old male coming in for follow up  HPI  He felt his breathing worsen with wheezing week  times a week    Drug use: No    Allergies:  No Known Allergies  Current Outpatient Medications   Medication Sig Dispense Refill   • methylPREDNISolone (MEDROL) 4 MG Oral Tablet Therapy Pack 1 dose pack as directed 1 Package 0   • azithromycin 250 MG Cardiovascular: Normal rate, regular rhythm and normal heart sounds. Pulmonary/Chest: Effort normal. He has wheezes (expiratory wheezing all lung fields). Neurological: He is alert. Skin: Skin is warm and dry.    Psychiatric: He has a normal mood a Pack; 1 dose pack as directed  -     azithromycin 250 MG Oral Tab; Take two tablets by mouth today, then one daily. -     Albuterol Sulfate HFA (PROAIR HFA) 108 (90 Base) MCG/ACT Inhalation Aero Soln;  Inhale 2 puffs into the lungs every 4 (four) hours as

## 2020-02-10 ENCOUNTER — NURSE ONLY (OUTPATIENT)
Dept: ENDOCRINOLOGY CLINIC | Facility: CLINIC | Age: 70
End: 2020-02-10
Payer: MEDICARE

## 2020-02-10 VITALS — WEIGHT: 200 LBS | BODY MASS INDEX: 31.02 KG/M2 | HEIGHT: 67.25 IN

## 2020-02-10 DIAGNOSIS — E11.65 TYPE 2 DIABETES MELLITUS WITH HYPERGLYCEMIA, WITHOUT LONG-TERM CURRENT USE OF INSULIN (HCC): Primary | ICD-10-CM

## 2020-02-10 PROCEDURE — G0108 DIAB MANAGE TRN  PER INDIV: HCPCS

## 2020-02-10 NOTE — PROGRESS NOTES
Zaria Chowdhury  : 4/15/1950 attended Step 1 Diabetic Education:    Date: 2/10/2020  Referring Provider: Roberto Robles  Start time: 9am End time: 10am    Ht 67.25\"   Wt 200 lb (90.7 kg)   BMI 31.09 kg/m²     HEMOGLOBIN A1C (%)   Date Value   2019 6

## 2020-02-13 ENCOUNTER — OFFICE VISIT (OUTPATIENT)
Dept: INTERNAL MEDICINE CLINIC | Facility: CLINIC | Age: 70
End: 2020-02-13
Payer: MEDICARE

## 2020-02-13 VITALS
DIASTOLIC BLOOD PRESSURE: 56 MMHG | HEART RATE: 68 BPM | BODY MASS INDEX: 32.3 KG/M2 | HEIGHT: 67.25 IN | OXYGEN SATURATION: 97 % | SYSTOLIC BLOOD PRESSURE: 130 MMHG | RESPIRATION RATE: 12 BRPM | WEIGHT: 208.25 LBS | TEMPERATURE: 98 F

## 2020-02-13 DIAGNOSIS — H81.02 MENIERE'S DISEASE OF LEFT EAR: ICD-10-CM

## 2020-02-13 DIAGNOSIS — E11.9 DIET-CONTROLLED TYPE 2 DIABETES MELLITUS (HCC): ICD-10-CM

## 2020-02-13 DIAGNOSIS — R06.2 WHEEZING: ICD-10-CM

## 2020-02-13 DIAGNOSIS — R05.9 COUGH: Primary | ICD-10-CM

## 2020-02-13 DIAGNOSIS — Z11.59 NEED FOR HEPATITIS C SCREENING TEST: ICD-10-CM

## 2020-02-13 PROCEDURE — 99214 OFFICE O/P EST MOD 30 MIN: CPT | Performed by: PHYSICIAN ASSISTANT

## 2020-02-13 NOTE — PROGRESS NOTES
Analisa Ceballos is a 68year old male. HPI:   Patient presents for f/u of recent cough and wheezing. Completed zpack and medrol dose pack. Feeling much better. Still has a mild dry cough mostly in the morning.   Occ wheezing throughout the day but feliciano performed and negative other than that noted in HPI. EXAM:   /56 (BP Location: Left arm, Patient Position: Sitting, Cuff Size: adult)   Pulse 68   Temp 98.2 °F (36.8 °C) (Oral)   Resp 12   Ht 67.25\"   Wt 208 lb 4 oz (94.5 kg)   SpO2 97%   BMI 32. patient is asked to return here in 3-6 months.

## 2020-02-13 NOTE — PATIENT INSTRUCTIONS
Cough / wheezing:  - may continue to use albuterol inhaler as needed  - call if symptoms do not continue to improve    Please do your lab work in 1-2 weeks. Remember to fast for 10-12 hours but drink plenty of water.     See Dr. Matt Perdomo tomorrow as gera

## 2020-02-20 ENCOUNTER — TELEPHONE (OUTPATIENT)
Dept: INTERNAL MEDICINE CLINIC | Facility: CLINIC | Age: 70
End: 2020-02-20

## 2020-03-02 ENCOUNTER — APPOINTMENT (OUTPATIENT)
Dept: LAB | Age: 70
End: 2020-03-02
Attending: INTERNAL MEDICINE
Payer: MEDICARE

## 2020-03-02 DIAGNOSIS — E11.9 DIET-CONTROLLED TYPE 2 DIABETES MELLITUS (HCC): ICD-10-CM

## 2020-03-02 DIAGNOSIS — Z11.59 NEED FOR HEPATITIS C SCREENING TEST: ICD-10-CM

## 2020-03-02 DIAGNOSIS — N17.9 ACUTE KIDNEY INJURY (HCC): ICD-10-CM

## 2020-03-02 LAB
ALT SERPL-CCNC: 31 U/L (ref 16–61)
ANION GAP SERPL CALC-SCNC: 2 MMOL/L (ref 0–18)
AST SERPL-CCNC: 21 U/L (ref 15–37)
BUN BLD-MCNC: 21 MG/DL (ref 7–18)
BUN/CREAT SERPL: 16.7 (ref 10–20)
CALCIUM BLD-MCNC: 9.3 MG/DL (ref 8.5–10.1)
CHLORIDE SERPL-SCNC: 104 MMOL/L (ref 98–112)
CHOLEST SMN-MCNC: 159 MG/DL (ref ?–200)
CO2 SERPL-SCNC: 33 MMOL/L (ref 21–32)
CREAT BLD-MCNC: 1.26 MG/DL (ref 0.7–1.3)
CREAT UR-SCNC: 214 MG/DL
GLUCOSE BLD-MCNC: 105 MG/DL (ref 70–99)
HCV AB SERPL QL IA: NONREACTIVE
HDLC SERPL-MCNC: 25 MG/DL (ref 40–59)
LDLC SERPL CALC-MCNC: 99 MG/DL (ref ?–100)
MICROALBUMIN UR-MCNC: 1.51 MG/DL
MICROALBUMIN/CREAT 24H UR-RTO: 7.1 UG/MG (ref ?–30)
NONHDLC SERPL-MCNC: 134 MG/DL (ref ?–130)
OSMOLALITY SERPL CALC.SUM OF ELEC: 291 MOSM/KG (ref 275–295)
PATIENT FASTING Y/N/NP: YES
PATIENT FASTING Y/N/NP: YES
POTASSIUM SERPL-SCNC: 4.6 MMOL/L (ref 3.5–5.1)
SODIUM SERPL-SCNC: 139 MMOL/L (ref 136–145)
TRIGL SERPL-MCNC: 173 MG/DL (ref 30–149)
VLDLC SERPL CALC-MCNC: 35 MG/DL (ref 0–30)

## 2020-03-02 PROCEDURE — 86803 HEPATITIS C AB TEST: CPT

## 2020-03-02 PROCEDURE — 36415 COLL VENOUS BLD VENIPUNCTURE: CPT

## 2020-03-02 PROCEDURE — 80048 BASIC METABOLIC PNL TOTAL CA: CPT

## 2020-03-02 PROCEDURE — 82570 ASSAY OF URINE CREATININE: CPT

## 2020-03-02 PROCEDURE — 82043 UR ALBUMIN QUANTITATIVE: CPT

## 2020-03-02 PROCEDURE — 84450 TRANSFERASE (AST) (SGOT): CPT

## 2020-03-02 PROCEDURE — 80061 LIPID PANEL: CPT

## 2020-03-02 PROCEDURE — 84460 ALANINE AMINO (ALT) (SGPT): CPT

## 2020-03-05 ENCOUNTER — DIABETIC EDUCATION (OUTPATIENT)
Dept: ENDOCRINOLOGY CLINIC | Facility: CLINIC | Age: 70
End: 2020-03-05
Payer: MEDICARE

## 2020-03-05 DIAGNOSIS — E11.65 TYPE 2 DIABETES MELLITUS WITH HYPERGLYCEMIA, WITHOUT LONG-TERM CURRENT USE OF INSULIN (HCC): Primary | ICD-10-CM

## 2020-03-05 PROCEDURE — G0109 DIAB MANAGE TRN IND/GROUP: HCPCS | Performed by: DIETITIAN, REGISTERED

## 2020-03-05 NOTE — PROGRESS NOTES
Millicent Paul  [id number] attended Step 2 Class: Pathophysiology of Diabetes, Types of Diabetes, Sources of Carbohydrate, Exercise, Blood Glucose Targets     Date: 3/5/2020  Referring Provider: Dr. Ana Lopez  Start time: 9:00 End time: 11:00    The patient

## 2020-03-12 ENCOUNTER — NURSE ONLY (OUTPATIENT)
Dept: ENDOCRINOLOGY CLINIC | Facility: CLINIC | Age: 70
End: 2020-03-12
Payer: MEDICARE

## 2020-03-12 VITALS — HEIGHT: 67.25 IN | BODY MASS INDEX: 32.27 KG/M2 | WEIGHT: 208 LBS

## 2020-03-12 DIAGNOSIS — E11.65 TYPE 2 DIABETES MELLITUS WITH HYPERGLYCEMIA, WITHOUT LONG-TERM CURRENT USE OF INSULIN (HCC): Primary | ICD-10-CM

## 2020-03-12 PROCEDURE — G0109 DIAB MANAGE TRN IND/GROUP: HCPCS

## 2020-03-12 NOTE — PROGRESS NOTES
Millicent Paul  [id number] attended Step 3 Class: Carbohydrate Counting, Medications, Treating Highs/Lows    Date: 3/12/2020  Referring Provider: Thomas Hankins  Start time: 9am End time: 11am    The patient participated during the class: Julian Vazquez states that

## 2020-05-28 ENCOUNTER — TELEPHONE (OUTPATIENT)
Dept: INTERNAL MEDICINE CLINIC | Facility: CLINIC | Age: 70
End: 2020-05-28

## 2020-05-29 ENCOUNTER — PATIENT MESSAGE (OUTPATIENT)
Dept: INTERNAL MEDICINE CLINIC | Facility: CLINIC | Age: 70
End: 2020-05-29

## 2020-05-29 DIAGNOSIS — E11.9 TYPE 2 DIABETES MELLITUS WITHOUT COMPLICATION, WITHOUT LONG-TERM CURRENT USE OF INSULIN (HCC): Primary | ICD-10-CM

## 2020-05-29 DIAGNOSIS — Z51.81 ENCOUNTER FOR MEDICATION MONITORING: ICD-10-CM

## 2020-05-29 NOTE — TELEPHONE ENCOUNTER
From: Hudson Beauchamp  To: Marcin Jon MD  Sent: 5/29/2020 12:45 PM CDT  Subject: Non-Urgent Medical Question    I have an appt wit you on June 18th. I am planning on coming in 2 or 3 days prior for blood work including the 3 month A1c.  Anything I should

## 2020-06-11 ENCOUNTER — LAB ENCOUNTER (OUTPATIENT)
Dept: LAB | Age: 70
End: 2020-06-11
Attending: INTERNAL MEDICINE
Payer: MEDICARE

## 2020-06-11 DIAGNOSIS — I77.9 DISEASE OF ARTERY (HCC): Primary | ICD-10-CM

## 2020-06-11 DIAGNOSIS — Z51.81 ENCOUNTER FOR MEDICATION MONITORING: ICD-10-CM

## 2020-06-11 DIAGNOSIS — E11.9 TYPE 2 DIABETES MELLITUS WITHOUT COMPLICATION, WITHOUT LONG-TERM CURRENT USE OF INSULIN (HCC): ICD-10-CM

## 2020-06-11 PROCEDURE — 83036 HEMOGLOBIN GLYCOSYLATED A1C: CPT

## 2020-06-11 PROCEDURE — 80061 LIPID PANEL: CPT

## 2020-06-11 PROCEDURE — 36415 COLL VENOUS BLD VENIPUNCTURE: CPT

## 2020-06-11 PROCEDURE — 80053 COMPREHEN METABOLIC PANEL: CPT

## 2020-06-18 ENCOUNTER — OFFICE VISIT (OUTPATIENT)
Dept: INTERNAL MEDICINE CLINIC | Facility: CLINIC | Age: 70
End: 2020-06-18
Payer: MEDICARE

## 2020-06-18 VITALS
WEIGHT: 211 LBS | OXYGEN SATURATION: 98 % | TEMPERATURE: 98 F | HEIGHT: 67.25 IN | BODY MASS INDEX: 32.73 KG/M2 | HEART RATE: 83 BPM | SYSTOLIC BLOOD PRESSURE: 134 MMHG | RESPIRATION RATE: 16 BRPM | DIASTOLIC BLOOD PRESSURE: 62 MMHG

## 2020-06-18 DIAGNOSIS — G47.00 INSOMNIA, UNSPECIFIED TYPE: ICD-10-CM

## 2020-06-18 DIAGNOSIS — Z87.891 HISTORY OF SMOKING GREATER THAN 50 PACK YEARS: ICD-10-CM

## 2020-06-18 DIAGNOSIS — R42 VERTIGO: ICD-10-CM

## 2020-06-18 DIAGNOSIS — E11.9 DIET-CONTROLLED TYPE 2 DIABETES MELLITUS (HCC): Primary | ICD-10-CM

## 2020-06-18 PROCEDURE — 99214 OFFICE O/P EST MOD 30 MIN: CPT | Performed by: INTERNAL MEDICINE

## 2020-06-18 RX ORDER — TRAZODONE HYDROCHLORIDE 50 MG/1
50 TABLET ORAL NIGHTLY
Qty: 30 TABLET | Refills: 1 | Status: SHIPPED | OUTPATIENT
Start: 2020-06-18 | End: 2020-11-03

## 2020-06-18 NOTE — PROGRESS NOTES
[facility] Internal Medicine Office Note  Chief Complaint:   Patient presents with: Follow - Up: Blood pressure       HPI:   This is a 65year old male coming in for follow up  HPI    He is taking triamterene-hctz every 3rd day for vertigo.   Sym tablet (50 mg total) by mouth nightly. 30 tablet 1   • Albuterol Sulfate HFA (PROAIR HFA) 108 (90 Base) MCG/ACT Inhalation Aero Soln Inhale 2 puffs into the lungs every 4 (four) hours as needed for Wheezing.  1 Inhaler 0   • Triamterene-HCTZ 37.5-25 MG Oral mellitus ([facility] [address] -a1c improved to 6.0. Cont low carb diet, exercise. [address] [address]s for repeat a1c in 4-6 months. Order placed.   -     HEMOGLOBIN A1C; Future    Vertigo -well controlled; cont triamterene-hctz as needed     History of smoking greater than 50 pack years -

## 2020-06-18 NOTE — PATIENT INSTRUCTIONS
2nd pneumonia vaccine (Pneumovax) due after 10/2020    Flu shot mid-October or later    Shingrix shingles vaccine at pharmacy - 2nd dose 2-6 months after the first dose     Consider trazodone 1/4-1/2 tablet nightly as needed      Insomnia information:   Sl trying to fall asleep. ?If you cannot fall asleep within 20 minutes, get up, go to another room and read or find another relaxing activity until you feel sleepy again.  Activities such as eating, balancing your checkbook, doing housework, watching TV, or

## 2020-08-10 ENCOUNTER — HOSPITAL ENCOUNTER (OUTPATIENT)
Dept: CT IMAGING | Age: 70
Discharge: HOME | End: 2020-08-10
Attending: INTERNAL MEDICINE
Payer: MEDICARE

## 2020-08-10 DIAGNOSIS — Z87.891 HISTORY OF SMOKING GREATER THAN 50 PACK YEARS: ICD-10-CM

## 2020-08-26 ENCOUNTER — PATIENT MESSAGE (OUTPATIENT)
Dept: INTERNAL MEDICINE CLINIC | Facility: CLINIC | Age: 70
End: 2020-08-26

## 2020-08-26 DIAGNOSIS — M79.673 PAIN OF FOOT, UNSPECIFIED LATERALITY: Primary | ICD-10-CM

## 2020-08-26 NOTE — TELEPHONE ENCOUNTER
From: Marialuisa Beauchamp  To: George Maguire MD  Sent: 8/26/2020 8:25 AM CDT  Subject: Referral Request    For the last two weeks or so I have been experiencing some issues with my right foot.  Initially, I had pain on top of the foot immediately behind the big

## 2020-09-11 ENCOUNTER — OFFICE VISIT (OUTPATIENT)
Dept: PODIATRY CLINIC | Facility: CLINIC | Age: 70
End: 2020-09-11
Payer: MEDICARE

## 2020-09-11 DIAGNOSIS — M20.5X1 HALLUX LIMITUS OF RIGHT FOOT: ICD-10-CM

## 2020-09-11 DIAGNOSIS — M79.671 RIGHT FOOT PAIN: Primary | ICD-10-CM

## 2020-09-11 DIAGNOSIS — M77.50 CAPSULITIS OF METATARSOPHALANGEAL (MTP) JOINT: ICD-10-CM

## 2020-09-11 PROCEDURE — 99203 OFFICE O/P NEW LOW 30 MIN: CPT | Performed by: PODIATRIST

## 2020-09-13 NOTE — PROGRESS NOTES
Kassandra Webb is a 65year old male. Patient presents with:  New Patient: right foot pain for about 2-3 wks - couldn't bent his big toe - bottom of right foot gets numb  a lot - pain scale at worst 6/10.         HPI:   Pleasant gentleman presents the clini COPD      Social History    Socioeconomic History      Marital status:       Spouse name: Not on file      Number of children: Not on file      Years of education: Not on file      Highest education level: Not on file    Tobacco Use      Smoking s with a functional hallux limitus and inflammation of the joint.     ASSESSMENT AND PLAN:   Diagnoses and all orders for this visit:    Right foot pain  -     XR FOOT WEIGHTBEARING (2 VIEWS), RIGHT   (CPT=73620)    Hallux limitus of right foot    Capsulitis

## 2020-10-31 DIAGNOSIS — E11.9 TYPE 2 DIABETES MELLITUS WITHOUT COMPLICATION, WITHOUT LONG-TERM CURRENT USE OF INSULIN (HCC): Primary | ICD-10-CM

## 2020-10-31 DIAGNOSIS — Z12.5 SCREENING FOR PROSTATE CANCER: ICD-10-CM

## 2020-11-02 NOTE — TELEPHONE ENCOUNTER
Called pt and scheduled cpx for 11/13   Pt would like to know if there is any other blood work needed besides the a1c?  He would like to get done prior to appt     Medication(s) to Refill:   Requested Prescriptions     Pending Prescriptions Disp Refills   •

## 2020-11-03 RX ORDER — TRAZODONE HYDROCHLORIDE 50 MG/1
TABLET ORAL
Qty: 90 TABLET | Refills: 1 | Status: SHIPPED | OUTPATIENT
Start: 2020-11-03 | End: 2020-11-13

## 2020-11-03 NOTE — TELEPHONE ENCOUNTER
Additional blood work orders added - lipid panel, metabolic panel, PSA blood test screening for prostate cancer and A1c order was already placed.  Please remind about appt needed for lab

## 2020-11-08 DIAGNOSIS — E11.9 TYPE 2 DIABETES MELLITUS WITHOUT COMPLICATION, WITHOUT LONG-TERM CURRENT USE OF INSULIN (HCC): Primary | ICD-10-CM

## 2020-11-09 ENCOUNTER — LAB ENCOUNTER (OUTPATIENT)
Dept: LAB | Age: 70
End: 2020-11-09
Attending: INTERNAL MEDICINE
Payer: MEDICARE

## 2020-11-09 DIAGNOSIS — Z12.5 SCREENING FOR PROSTATE CANCER: ICD-10-CM

## 2020-11-09 DIAGNOSIS — E11.9 DIET-CONTROLLED TYPE 2 DIABETES MELLITUS (HCC): ICD-10-CM

## 2020-11-09 DIAGNOSIS — E11.9 TYPE 2 DIABETES MELLITUS WITHOUT COMPLICATION, WITHOUT LONG-TERM CURRENT USE OF INSULIN (HCC): ICD-10-CM

## 2020-11-09 PROCEDURE — 83036 HEMOGLOBIN GLYCOSYLATED A1C: CPT

## 2020-11-09 PROCEDURE — 36415 COLL VENOUS BLD VENIPUNCTURE: CPT

## 2020-11-09 PROCEDURE — 80061 LIPID PANEL: CPT

## 2020-11-09 PROCEDURE — 80053 COMPREHEN METABOLIC PANEL: CPT

## 2020-11-09 RX ORDER — LANCETS
EACH MISCELLANEOUS
Qty: 100 EACH | Refills: 3 | Status: SHIPPED | OUTPATIENT
Start: 2020-11-09

## 2020-11-09 RX ORDER — BLOOD SUGAR DIAGNOSTIC
STRIP MISCELLANEOUS
Qty: 100 STRIP | Refills: 3 | Status: SHIPPED | OUTPATIENT
Start: 2020-11-09

## 2020-11-13 ENCOUNTER — OFFICE VISIT (OUTPATIENT)
Dept: INTERNAL MEDICINE CLINIC | Facility: CLINIC | Age: 70
End: 2020-11-13
Payer: MEDICARE

## 2020-11-13 VITALS
DIASTOLIC BLOOD PRESSURE: 66 MMHG | HEIGHT: 68 IN | SYSTOLIC BLOOD PRESSURE: 138 MMHG | OXYGEN SATURATION: 98 % | RESPIRATION RATE: 16 BRPM | BODY MASS INDEX: 31.62 KG/M2 | HEART RATE: 72 BPM | TEMPERATURE: 98 F | WEIGHT: 208.63 LBS

## 2020-11-13 DIAGNOSIS — Z00.00 WELLNESS EXAMINATION: Primary | ICD-10-CM

## 2020-11-13 DIAGNOSIS — E78.1 HYPERTRIGLYCERIDEMIA: ICD-10-CM

## 2020-11-13 DIAGNOSIS — Z23 NEED FOR STREPTOCOCCUS PNEUMONIAE VACCINATION: ICD-10-CM

## 2020-11-13 DIAGNOSIS — R42 VERTIGO: ICD-10-CM

## 2020-11-13 DIAGNOSIS — E11.9 DIET-CONTROLLED TYPE 2 DIABETES MELLITUS (HCC): ICD-10-CM

## 2020-11-13 PROCEDURE — 90732 PPSV23 VACC 2 YRS+ SUBQ/IM: CPT | Performed by: INTERNAL MEDICINE

## 2020-11-13 PROCEDURE — G0009 ADMIN PNEUMOCOCCAL VACCINE: HCPCS | Performed by: INTERNAL MEDICINE

## 2020-11-13 PROCEDURE — G0439 PPPS, SUBSEQ VISIT: HCPCS | Performed by: INTERNAL MEDICINE

## 2020-11-13 RX ORDER — TRIAMTERENE AND HYDROCHLOROTHIAZIDE 37.5; 25 MG/1; MG/1
CAPSULE ORAL
Qty: 30 CAPSULE | Refills: 3 | Status: SHIPPED | OUTPATIENT
Start: 2020-11-13 | End: 2021-04-08

## 2020-11-13 RX ORDER — ALBUTEROL SULFATE 90 UG/1
2 AEROSOL, METERED RESPIRATORY (INHALATION) EVERY 4 HOURS PRN
Qty: 1 INHALER | Refills: 11 | Status: SHIPPED | OUTPATIENT
Start: 2020-11-13 | End: 2021-11-12

## 2020-11-13 NOTE — PROGRESS NOTES
REASON FOR VISIT:    Antwan Antonio is a 65year old male who presents for a Medicare Annual Wellness visit. Diet controlled DM  a1c on 11/9/20 is 5.9    Vertigo/Meniere's   Sees Dr. Talat Sewell triamterene-hctz every 3rd day.  Last episode was Ma 11/9/2020 6/11/2020 3/2/2020 2/4/2020 11/7/2019 10/25/2019 10/4/2019   BUN 7 - 18 mg/dL 20(H) 21(H) 21(H) 25(H) 19(H) 18 20(H)   Creatinine 0.70 - 1.30 mg/dL 1.18 1.18 1.26 1.48(H) 1.24 1.48(H) 1.23   Some recent data might be hidden     AST and ALT Latest at all  PHQ-2 SCORE: 0        Advance Directives     Do you have a healthcare power of ?: Yes  Do you have a living will?: Yes     Cognitive Assessment     What day of the week is this?: Correct  What month is it?: Correct  What year is it?: Rose Annually Data entered on: 5/22/2020   Last Dilated Eye Exam 5/22/2020              ALLERGIES:   No Known Allergies  MEDICAL INFORMATION:   Past Medical History:   Diagnosis Date   • Arthritis    • Diabetes ([facility] [address]     diet controlled - dx 2/2020   • Elevated stream  MUSCULOSKELETAL: denies back pain  NEURO: denies headaches  PSYCHE: denies depression or anxiety  HEMATOLOGIC: denies hx of anemia  ENDOCRINE: denies thyroid history  ALL/ASTHMA: denies hx of allergy or asthma    EXAM:   /66 (BP Location: Rig months; chol ok  -     COMP METABOLIC PANEL (14); Future  -     LIPID PANEL; Future    Other orders  -     Albuterol Sulfate HFA (PROAIR HFA) 108 (90 Base) MCG/ACT Inhalation Aero Soln;  Inhale 2 puffs into the lungs every 4 (four) hours as needed for [facility]

## 2020-12-02 ENCOUNTER — PATIENT MESSAGE (OUTPATIENT)
Dept: INTERNAL MEDICINE CLINIC | Facility: CLINIC | Age: 70
End: 2020-12-02

## 2020-12-02 NOTE — TELEPHONE ENCOUNTER
From: Janel Beauchamp  To: Eveline Tolbert MD  Sent: 12/2/2020 7:45 AM CST  Subject: Non-Urgent Medical Question    During my last visit to your office on November 13th I received my 2nd pneumonia injection.  Since that time, I can't seem to shake a chronic co

## 2020-12-03 ENCOUNTER — APPOINTMENT (OUTPATIENT)
Dept: LAB | Age: 70
End: 2020-12-03
Attending: INTERNAL MEDICINE
Payer: MEDICARE

## 2020-12-03 ENCOUNTER — TELEMEDICINE (OUTPATIENT)
Dept: INTERNAL MEDICINE CLINIC | Facility: CLINIC | Age: 70
End: 2020-12-03
Payer: MEDICARE

## 2020-12-03 DIAGNOSIS — R05.9 COUGH: ICD-10-CM

## 2020-12-03 DIAGNOSIS — R05.9 COUGH: Primary | ICD-10-CM

## 2020-12-03 PROCEDURE — 99441 PHONE E/M BY PHYS 5-10 MIN: CPT | Performed by: INTERNAL MEDICINE

## 2020-12-03 NOTE — PROGRESS NOTES
Virtual Telephone Check-In    Ty Roper verbally consents to a Virtual/Telephone Check-In visit on 12/03/20. Patient has been referred to the [facility] website at [email or web address] to review the yearly Consent to Treat document.     Patient Alison Espinoza

## 2020-12-07 ENCOUNTER — PATIENT MESSAGE (OUTPATIENT)
Dept: INTERNAL MEDICINE CLINIC | Facility: CLINIC | Age: 70
End: 2020-12-07

## 2020-12-07 DIAGNOSIS — R05.9 COUGH: Primary | ICD-10-CM

## 2020-12-07 NOTE — TELEPHONE ENCOUNTER
From: Antonio Beauchamp  To: Mara Fraire MD  Sent: 12/7/2020 9:26 AM CST  Subject: Visit Follow-up Question    Hope you a nice weekend.     As I wrote earlier, I was pleased at the result of my COVID test. However, I am still experiencing shortness of breath

## 2020-12-08 RX ORDER — CODEINE PHOSPHATE AND GUAIFENESIN 10; 100 MG/5ML; MG/5ML
5 SOLUTION ORAL NIGHTLY PRN
Qty: 118 ML | Refills: 0 | Status: SHIPPED | OUTPATIENT
Start: 2020-12-08 | End: 2021-05-28

## 2020-12-08 NOTE — TELEPHONE ENCOUNTER
He notes today is the first day where he is starting to feel better. He coughed this morning for 2 hours and brought up mucous and felt improved after that. He slept better last night but only could sleep in 3 hour increments due to cough. No fever.     Fran May

## 2020-12-17 ENCOUNTER — PATIENT MESSAGE (OUTPATIENT)
Dept: INTERNAL MEDICINE CLINIC | Facility: CLINIC | Age: 70
End: 2020-12-17

## 2020-12-17 DIAGNOSIS — R05.9 COUGH: Primary | ICD-10-CM

## 2020-12-21 ENCOUNTER — HOSPITAL ENCOUNTER (OUTPATIENT)
Dept: GENERAL RADIOLOGY | Age: 70
Discharge: HOME | End: 2020-12-21
Attending: INTERNAL MEDICINE
Payer: MEDICARE

## 2020-12-21 DIAGNOSIS — R05.9 COUGH: ICD-10-CM

## 2020-12-21 PROCEDURE — 71046 X-RAY EXAM CHEST 2 VIEWS: CPT | Performed by: INTERNAL MEDICINE

## 2020-12-21 RX ORDER — AZITHROMYCIN 250 MG/1
TABLET, FILM COATED ORAL
Qty: 6 TABLET | Refills: 0 | Status: SHIPPED | OUTPATIENT
Start: 2020-12-21 | End: 2020-12-26

## 2020-12-21 RX ORDER — METHYLPREDNISOLONE 4 MG/1
TABLET ORAL
Qty: 1 KIT | Refills: 0 | Status: SHIPPED | OUTPATIENT
Start: 2020-12-21 | End: 2021-01-28 | Stop reason: ALTCHOICE

## 2020-12-21 NOTE — TELEPHONE ENCOUNTER
Left message for patient to call office  Please call patient if he does not call back to check on his status and if unchanged or worsened, I recommend chest x-ray, medrol dose pack and Z pack     Please text/call me to log back on to send through those ord

## 2020-12-21 NOTE — TELEPHONE ENCOUNTER
Pt contacted and states symptoms are unchanged. C/o chest congestion and cough. Contact information provided for central scheduling. Dr Ben Izquierdo contacted for Rx to [facility].

## 2021-01-25 ENCOUNTER — PATIENT MESSAGE (OUTPATIENT)
Dept: INTERNAL MEDICINE CLINIC | Facility: CLINIC | Age: 71
End: 2021-01-25

## 2021-01-25 DIAGNOSIS — R06.02 SHORTNESS OF BREATH: ICD-10-CM

## 2021-01-25 DIAGNOSIS — R05.3 CHRONIC COUGH: Primary | ICD-10-CM

## 2021-01-25 NOTE — TELEPHONE ENCOUNTER
From: Sammie Beauchamp  To: Lebron Eden MD  Sent: 1/25/2021 12:46 PM CST  Subject: Non-Urgent Medical Question    Since mid-November I experienced some shortness of breath and coughing spells.  As a result, in early December you ordered a COVID test which w

## 2021-01-26 NOTE — TELEPHONE ENCOUNTER
Appt scheduled per      Future Appointments   Date Time Provider Osorio Mariia   1/28/2021  2:00 PM Rachid Manzo MD [facility] [place]

## 2021-01-28 ENCOUNTER — OFFICE VISIT (OUTPATIENT)
Dept: INTERNAL MEDICINE CLINIC | Facility: CLINIC | Age: 71
End: 2021-01-28
Payer: MEDICARE

## 2021-01-28 ENCOUNTER — HOSPITAL ENCOUNTER (OUTPATIENT)
Dept: ULTRASOUND IMAGING | Age: 71
Discharge: HOME | End: 2021-01-28
Attending: INTERNAL MEDICINE
Payer: MEDICARE

## 2021-01-28 VITALS
BODY MASS INDEX: 31.52 KG/M2 | SYSTOLIC BLOOD PRESSURE: 142 MMHG | TEMPERATURE: 98 F | HEART RATE: 80 BPM | HEIGHT: 68 IN | DIASTOLIC BLOOD PRESSURE: 70 MMHG | OXYGEN SATURATION: 98 % | WEIGHT: 208 LBS | RESPIRATION RATE: 16 BRPM

## 2021-01-28 DIAGNOSIS — R06.02 SHORTNESS OF BREATH: Primary | ICD-10-CM

## 2021-01-28 DIAGNOSIS — G45.1 CAROTID ARTERY INSUFFICIENCY SYNDROME: ICD-10-CM

## 2021-01-28 DIAGNOSIS — R05.9 COUGH: ICD-10-CM

## 2021-01-28 PROCEDURE — 93880 EXTRACRANIAL BILAT STUDY: CPT | Performed by: INTERNAL MEDICINE

## 2021-01-28 PROCEDURE — 99213 OFFICE O/P EST LOW 20 MIN: CPT | Performed by: INTERNAL MEDICINE

## 2021-01-28 RX ORDER — ROSUVASTATIN CALCIUM 10 MG/1
10 TABLET, COATED ORAL NIGHTLY
COMMUNITY

## 2021-01-28 NOTE — PROGRESS NOTES
[facility] Internal Medicine Office Note  Chief Complaint:   Shortness of breath and cough    HPI:   This is a 65year old male coming in for SOB and cough  HPI    Shortness of breath and cough since early Dec; negative for COVID and related to v 100-10 MG/5ML Oral Solution Take 5 mL by mouth nightly as needed for cough. 118 mL 0   • Albuterol Sulfate HFA (PROAIR HFA) 108 (90 Base) MCG/ACT Inhalation Aero Soln Inhale 2 puffs into the lungs every 4 (four) hours as needed for Wheezing.  1 Inhaler 11 visit:    Shortness of breath and   Cough -symptoms are much improved with the addition of flovent. Normal physical exam and O2 sat is 98%. Reviewed last ECHO 2 years ago which showed a normal EF and grade 1 diastolic dysfunction.  I think that symptoms are

## 2021-01-28 NOTE — PATIENT INSTRUCTIONS
Blood work Feb 10, 2021 or later    Continue flovent inhaler daily  Albuterol is ok to take as needed    Let me know if symptoms do not continue to improve after taking the inhaler for 4 weeks and we will plan for a CT scan of the chest

## 2021-02-03 DIAGNOSIS — Z23 NEED FOR VACCINATION: ICD-10-CM

## 2021-03-01 ENCOUNTER — MED REC SCAN ONLY (OUTPATIENT)
Dept: INTERNAL MEDICINE CLINIC | Facility: CLINIC | Age: 71
End: 2021-03-01

## 2021-03-08 ENCOUNTER — PATIENT MESSAGE (OUTPATIENT)
Dept: INTERNAL MEDICINE CLINIC | Facility: CLINIC | Age: 71
End: 2021-03-08

## 2021-03-08 DIAGNOSIS — E11.9 TYPE 2 DIABETES MELLITUS WITHOUT COMPLICATION, WITHOUT LONG-TERM CURRENT USE OF INSULIN (HCC): Primary | ICD-10-CM

## 2021-03-08 NOTE — TELEPHONE ENCOUNTER
From: Wilfred Beauchamp  To: Meg Castillo MD  Sent: 3/8/2021 8:48 AM CST  Subject: Visit Follow-up Question    ITEM 1 - I started the Flovent on Jan 26th with 2 puffs in the AM and 2 at night.  Two weeks later (on Feb 9th) I lowered it to 1 puff in the AM and

## 2021-03-08 NOTE — TELEPHONE ENCOUNTER
Dr. Mishel Antonio,     #1: per LOV, continue flovent daily. #2: looks like the A1C, Lipid, and CMP order under you show expected date of 5/1/2021 hence why lab did not draw them. Please advise.

## 2021-04-07 DIAGNOSIS — R42 VERTIGO: ICD-10-CM

## 2021-04-08 RX ORDER — TRIAMTERENE AND HYDROCHLOROTHIAZIDE 37.5; 25 MG/1; MG/1
CAPSULE ORAL
Qty: 90 CAPSULE | Refills: 0 | Status: SHIPPED | OUTPATIENT
Start: 2021-04-08 | End: 2021-09-27

## 2021-04-08 NOTE — TELEPHONE ENCOUNTER
Protocol passed   Medication(s) to Refill:   Requested Prescriptions     Pending Prescriptions Disp Refills   • Triamterene-HCTZ 37.5-25 MG Oral Cap [Pharmacy Med Name: TRIAMTERENE 37.5MG/ HCTZ 25MG CAPS] 90 capsule 0     Sig: TAKE ONE CAPSULE BY MOUTH IKE

## 2021-05-20 ENCOUNTER — LAB ENCOUNTER (OUTPATIENT)
Dept: LAB | Age: 71
End: 2021-05-20
Attending: INTERNAL MEDICINE
Payer: MEDICARE

## 2021-05-20 DIAGNOSIS — E11.9 DIET-CONTROLLED TYPE 2 DIABETES MELLITUS (HCC): ICD-10-CM

## 2021-05-20 DIAGNOSIS — E11.9 TYPE 2 DIABETES MELLITUS WITHOUT COMPLICATION, WITHOUT LONG-TERM CURRENT USE OF INSULIN (HCC): ICD-10-CM

## 2021-05-20 DIAGNOSIS — E78.1 HYPERTRIGLYCERIDEMIA: ICD-10-CM

## 2021-05-20 PROCEDURE — 80061 LIPID PANEL: CPT

## 2021-05-20 PROCEDURE — 83036 HEMOGLOBIN GLYCOSYLATED A1C: CPT

## 2021-05-20 PROCEDURE — 36415 COLL VENOUS BLD VENIPUNCTURE: CPT

## 2021-05-20 PROCEDURE — 80053 COMPREHEN METABOLIC PANEL: CPT

## 2021-05-28 ENCOUNTER — OFFICE VISIT (OUTPATIENT)
Dept: INTERNAL MEDICINE CLINIC | Facility: CLINIC | Age: 71
End: 2021-05-28
Payer: MEDICARE

## 2021-05-28 VITALS
TEMPERATURE: 98 F | HEART RATE: 64 BPM | RESPIRATION RATE: 16 BRPM | OXYGEN SATURATION: 99 % | DIASTOLIC BLOOD PRESSURE: 64 MMHG | WEIGHT: 215 LBS | BODY MASS INDEX: 32.58 KG/M2 | HEIGHT: 68 IN | SYSTOLIC BLOOD PRESSURE: 130 MMHG

## 2021-05-28 DIAGNOSIS — E11.9 DIET-CONTROLLED TYPE 2 DIABETES MELLITUS (HCC): ICD-10-CM

## 2021-05-28 DIAGNOSIS — H81.09 MENIERE'S DISEASE, UNSPECIFIED LATERALITY: ICD-10-CM

## 2021-05-28 DIAGNOSIS — M25.562 CHRONIC PAIN OF LEFT KNEE: Primary | ICD-10-CM

## 2021-05-28 DIAGNOSIS — G89.29 CHRONIC PAIN OF LEFT KNEE: Primary | ICD-10-CM

## 2021-05-28 PROCEDURE — 99213 OFFICE O/P EST LOW 20 MIN: CPT | Performed by: INTERNAL MEDICINE

## 2021-05-28 RX ORDER — ANTIOX #8/OM3/DHA/EPA/LUT/ZEAX 250-2.5 MG
CAPSULE ORAL
COMMUNITY
Start: 2021-01-22

## 2021-05-28 RX ORDER — TRAZODONE HYDROCHLORIDE 50 MG/1
50 TABLET ORAL NIGHTLY
COMMUNITY
Start: 2021-04-16 | End: 2021-07-22

## 2021-05-28 NOTE — PROGRESS NOTES
[facility] Internal Medicine Office Note  Chief Complaint:   Patient presents with: Follow - Up      HPI:   This is a 66year old male coming in for follow up  HPI    He has Meniere's and is on triamterene-hydrochlorothiazide every 3rd day.  He i Vaping Use: Never used    Alcohol use: Yes      Comment: 1/2 drink weekly     Drug use: No    Allergies:  No Known Allergies  Current Outpatient Medications   Medication Sig Dispense Refill   • traZODone HCl 50 MG Oral Tab Take 50 mg by mouth nightly. Pulmonary effort is normal.      Breath sounds: Normal breath sounds. Musculoskeletal:      Cervical back: Neck supple. Skin:     General: Skin is warm and dry. Neurological:      General: No focal deficit present. Mental Status: He is alert. treatments as a result of today.      Lubna Hartley MD

## 2021-07-22 RX ORDER — TRAZODONE HYDROCHLORIDE 50 MG/1
TABLET ORAL
Qty: 90 TABLET | Refills: 1 | Status: SHIPPED | OUTPATIENT
Start: 2021-07-22

## 2021-08-03 ENCOUNTER — PATIENT MESSAGE (OUTPATIENT)
Dept: INTERNAL MEDICINE CLINIC | Facility: CLINIC | Age: 71
End: 2021-08-03

## 2021-08-03 DIAGNOSIS — Z87.891 HISTORY OF SMOKING GREATER THAN 50 PACK YEARS: Primary | ICD-10-CM

## 2021-08-03 RX ORDER — TRAZODONE HYDROCHLORIDE 50 MG/1
50 TABLET ORAL NIGHTLY
Qty: 90 TABLET | Refills: 1 | Status: CANCELLED | OUTPATIENT
Start: 2021-08-03

## 2021-08-03 NOTE — TELEPHONE ENCOUNTER
traZODone 50 MG Oral Tab         Sig: Take 1 tablet (50 mg total) by mouth nightly.     Disp:  90 tablet    Refills:  1    Start: 8/3/2021    Class: Normal    Non-formulary    Last ordered: 1 week ago by Aneta Lopez MD      Last OV:5/28/21  No protocol m

## 2021-08-03 NOTE — TELEPHONE ENCOUNTER
From: Pattie Beauchamp  To: Ester Soriano MD  Sent: 8/3/2021 10:15 AM CDT  Subject: Other    Well that didn't work out at all.  I just tried to schudule a morning CT Lung Screening for a date starting on Aug 10 through Aug 13. but MyChart wouldn't let me do i

## 2021-08-03 NOTE — TELEPHONE ENCOUNTER
From: Janel Beauchamp  To: Eveline Tolbetr MD  Sent: 8/3/2021 9:54 AM CDT  Subject: Other    How do I schedule a CT Lung Scan at [place] [address] in [place]?

## 2021-08-04 ENCOUNTER — PATIENT MESSAGE (OUTPATIENT)
Dept: INTERNAL MEDICINE CLINIC | Facility: CLINIC | Age: 71
End: 2021-08-04

## 2021-08-04 NOTE — TELEPHONE ENCOUNTER
From: Maximo Beauchamp  To: Arti Miller MD  Sent: 8/4/2021 9:25 AM CDT  Subject: Prescription Question    I need a refill for traZODOne.  Can you expedite this request?

## 2021-08-05 RX ORDER — TRAZODONE HYDROCHLORIDE 50 MG/1
50 TABLET ORAL NIGHTLY
Qty: 90 TABLET | Refills: 1 | OUTPATIENT
Start: 2021-08-05

## 2021-08-09 ENCOUNTER — TELEPHONE (OUTPATIENT)
Dept: INTERNAL MEDICINE CLINIC | Facility: CLINIC | Age: 71
End: 2021-08-09

## 2021-08-09 ENCOUNTER — PATIENT MESSAGE (OUTPATIENT)
Dept: INTERNAL MEDICINE CLINIC | Facility: CLINIC | Age: 71
End: 2021-08-09

## 2021-08-09 DIAGNOSIS — Z12.11 SCREENING FOR MALIGNANT NEOPLASM OF COLON: Primary | ICD-10-CM

## 2021-08-09 NOTE — TELEPHONE ENCOUNTER
Referral placed; let him know that he can call [facility] to schedule appt for colonoscopy

## 2021-08-09 NOTE — TELEPHONE ENCOUNTER
LOV 5/28/2021    Previous referral for Dr. Malathi Monreal placed in 2019. Referral pending if appropriate.

## 2021-08-09 NOTE — TELEPHONE ENCOUNTER
Last refill: [facility] PROCTOR   7/22/21:   TRAZODONE HCL 50 MG Oral Tab 90 tablet 1 7/22/2021     Sig: TAKE 1 TABLET BY MOUTH EVERY NIGHT

## 2021-08-09 NOTE — TELEPHONE ENCOUNTER
From: Natalie Beauchamp  To: Thomas Hankins MD  Sent: 8/9/2021 7:17 AM CDT  Subject: Prescription Question    Can you please arrange to refill my prescription for Trazodone.  My current prescription of 90 tablets was filled on 4/16/21 and needs Dr. Marvin Chaney

## 2021-08-10 NOTE — TELEPHONE ENCOUNTER
Patient requested rx to be refilled not knowing the rx was already at the pharmacy waiting to be picked up   Patient stated he  today   Disregard request

## 2021-08-12 ENCOUNTER — HOSPITAL ENCOUNTER (OUTPATIENT)
Dept: CT IMAGING | Age: 71
Discharge: HOME | End: 2021-08-12
Attending: INTERNAL MEDICINE
Payer: MEDICARE

## 2021-08-12 DIAGNOSIS — Z87.891 HISTORY OF SMOKING GREATER THAN 50 PACK YEARS: ICD-10-CM

## 2021-08-12 PROCEDURE — 71271 CT THORAX LUNG CANCER SCR C-: CPT | Performed by: INTERNAL MEDICINE

## 2021-09-26 DIAGNOSIS — R42 VERTIGO: ICD-10-CM

## 2021-09-27 RX ORDER — TRIAMTERENE AND HYDROCHLOROTHIAZIDE 37.5; 25 MG/1; MG/1
CAPSULE ORAL
Qty: 90 CAPSULE | Refills: 0 | Status: SHIPPED | OUTPATIENT
Start: 2021-09-27 | End: 2021-11-22

## 2021-10-04 RX ORDER — SODIUM CHLORIDE, SODIUM LACTATE, POTASSIUM CHLORIDE, CALCIUM CHLORIDE 600; 310; 30; 20 MG/100ML; MG/100ML; MG/100ML; MG/100ML
INJECTION, SOLUTION INTRAVENOUS CONTINUOUS
Status: CANCELLED | OUTPATIENT
Start: 2021-10-04

## 2021-10-05 ENCOUNTER — LAB ENCOUNTER (OUTPATIENT)
Dept: LAB | Age: 71
End: 2021-10-05
Attending: INTERNAL MEDICINE
Payer: MEDICARE

## 2021-10-05 DIAGNOSIS — Z01.812 ENCOUNTER FOR PREOPERATIVE SCREENING LABORATORY TESTING FOR COVID-19 VIRUS: ICD-10-CM

## 2021-10-05 DIAGNOSIS — Z20.822 ENCOUNTER FOR PREOPERATIVE SCREENING LABORATORY TESTING FOR COVID-19 VIRUS: ICD-10-CM

## 2021-10-08 ENCOUNTER — HOSPITAL ENCOUNTER (OUTPATIENT)
Facility: HOSPITAL | Age: 71
Setting detail: HOSPITAL OUTPATIENT SURGERY
Discharge: HOME | End: 2021-10-08
Attending: INTERNAL MEDICINE | Admitting: INTERNAL MEDICINE
Payer: MEDICARE

## 2021-10-08 ENCOUNTER — ANESTHESIA EVENT (OUTPATIENT)
Dept: ENDOSCOPY | Facility: HOSPITAL | Age: 71
End: 2021-10-08
Payer: MEDICARE

## 2021-10-08 ENCOUNTER — ANESTHESIA (OUTPATIENT)
Dept: ENDOSCOPY | Facility: HOSPITAL | Age: 71
End: 2021-10-08
Payer: MEDICARE

## 2021-10-08 VITALS
OXYGEN SATURATION: 97 % | SYSTOLIC BLOOD PRESSURE: 114 MMHG | DIASTOLIC BLOOD PRESSURE: 58 MMHG | RESPIRATION RATE: 18 BRPM | TEMPERATURE: 97 F | BODY MASS INDEX: 30.06 KG/M2 | WEIGHT: 210 LBS | HEART RATE: 65 BPM | HEIGHT: 70 IN

## 2021-10-08 DIAGNOSIS — Z86.010 PERSONAL HISTORY OF COLONIC POLYPS: ICD-10-CM

## 2021-10-08 DIAGNOSIS — Z20.822 ENCOUNTER FOR PREOPERATIVE SCREENING LABORATORY TESTING FOR COVID-19 VIRUS: Primary | ICD-10-CM

## 2021-10-08 DIAGNOSIS — Z01.812 ENCOUNTER FOR PREOPERATIVE SCREENING LABORATORY TESTING FOR COVID-19 VIRUS: Primary | ICD-10-CM

## 2021-10-08 PROCEDURE — 82962 GLUCOSE BLOOD TEST: CPT

## 2021-10-08 PROCEDURE — 0DBM8ZX EXCISION OF DESCENDING COLON, VIA NATURAL OR ARTIFICIAL OPENING ENDOSCOPIC, DIAGNOSTIC: ICD-10-PCS | Performed by: INTERNAL MEDICINE

## 2021-10-08 PROCEDURE — 0DBL8ZX EXCISION OF TRANSVERSE COLON, VIA NATURAL OR ARTIFICIAL OPENING ENDOSCOPIC, DIAGNOSTIC: ICD-10-PCS | Performed by: INTERNAL MEDICINE

## 2021-10-08 PROCEDURE — 88305 TISSUE EXAM BY PATHOLOGIST: CPT | Performed by: INTERNAL MEDICINE

## 2021-10-08 RX ORDER — SODIUM CHLORIDE, SODIUM LACTATE, POTASSIUM CHLORIDE, CALCIUM CHLORIDE 600; 310; 30; 20 MG/100ML; MG/100ML; MG/100ML; MG/100ML
INJECTION, SOLUTION INTRAVENOUS CONTINUOUS
Status: DISCONTINUED | OUTPATIENT
Start: 2021-10-08 | End: 2021-10-08

## 2021-10-08 RX ORDER — LIDOCAINE HYDROCHLORIDE 10 MG/ML
INJECTION, SOLUTION EPIDURAL; INFILTRATION; INTRACAUDAL; PERINEURAL AS NEEDED
Status: DISCONTINUED | OUTPATIENT
Start: 2021-10-08 | End: 2021-10-08 | Stop reason: SURG

## 2021-10-08 RX ADMIN — LIDOCAINE HYDROCHLORIDE 25 MG: 10 INJECTION, SOLUTION EPIDURAL; INFILTRATION; INTRACAUDAL; PERINEURAL at 07:08:00

## 2021-10-08 NOTE — H&P
[facility] Patient Status:  Hospital Outpatient Surgery    4/15/1950 MRN [id number]   Location [address] [place] [address] Attending Alistair Busby MD   Hosp Day # 0 PCP Arti Miller MD     His irregular bowel habits, painful swallowing, diarrhea, abdominal pain, constipation, nausea, incontinence of stool, vomiting, black stools, get full quickly at meals, blood in stools, abdominal distention, jaundice, flatulence, vomiting blood, bloating, her reaction to anesthesia, food allergy. Eyes: Denies blurred/double vision, eye disease, glasses or contacts, glaucoma. ENT: Denies nose or gums bleeding, mouth sores, bad breath or bad taste in mouth, hearing loss.     Physical Exam:   General: alert,

## 2021-10-08 NOTE — OPERATIVE REPORT
Colon operative report  Patient Name: Ortega HUNT [facility]  Procedure: Colonoscopy with snare polypectomy  Date of procedure: 10/8/2021    Indication: Personal history of adenomatous colon polyps  Date of last colonoscopy: 2014 - outside institution  Attending: in a retroflexed position, and the rectal bulb was thus visualized. The endoscope was righted, and air was suctioned from the colon to the best of my ability, as it was during withdrawn from the colon. The endoscope was then removed from the patient.   Vince Ceja

## 2021-10-08 NOTE — ANESTHESIA POSTPROCEDURE EVALUATION
[address]  Patient Status:  Hospital Outpatient Surgery   Age/Gender 66year old male MRN [id number]   Location [id number] [place] [address] Attending Jorge Torres MD   Hosp Day # 0 PCP Lebron Eden MD       Anesthesia

## 2021-10-08 NOTE — ANESTHESIA PREPROCEDURE EVALUATION
PRE-OP EVALUATION    Patient Name: [facility]    Admit Diagnosis: Personal history of colonic polyps [Z86.010]    Pre-op Diagnosis: Personal history of colonic polyps [Z86.010]    COLONOSCOPY    Anesthesia Procedure: COLONOSCOPY (N/A )    Surgeon(s) an Cardiovascular    Negative cardiovascular ROS.     Exercise tolerance: good     MET: >4                 (+) pacemaker/AICD                          Endo/Other    Negative endo/other ROS.  (+) diabetes and well controlled,                           Pulmona patient                Present on Admission:  **None**

## 2021-10-22 ENCOUNTER — LAB ENCOUNTER (OUTPATIENT)
Dept: LAB | Age: 71
End: 2021-10-22
Attending: INTERNAL MEDICINE
Payer: MEDICARE

## 2021-10-22 DIAGNOSIS — E11.9 DIET-CONTROLLED TYPE 2 DIABETES MELLITUS (HCC): ICD-10-CM

## 2021-10-22 PROCEDURE — 82043 UR ALBUMIN QUANTITATIVE: CPT

## 2021-10-22 PROCEDURE — 83036 HEMOGLOBIN GLYCOSYLATED A1C: CPT

## 2021-10-22 PROCEDURE — 82570 ASSAY OF URINE CREATININE: CPT

## 2021-10-22 PROCEDURE — 36415 COLL VENOUS BLD VENIPUNCTURE: CPT

## 2021-11-12 ENCOUNTER — OFFICE VISIT (OUTPATIENT)
Dept: INTERNAL MEDICINE CLINIC | Facility: CLINIC | Age: 71
End: 2021-11-12
Payer: MEDICARE

## 2021-11-12 VITALS
WEIGHT: 215.81 LBS | TEMPERATURE: 98 F | DIASTOLIC BLOOD PRESSURE: 66 MMHG | BODY MASS INDEX: 33.48 KG/M2 | HEIGHT: 67.5 IN | HEART RATE: 72 BPM | RESPIRATION RATE: 18 BRPM | OXYGEN SATURATION: 99 % | SYSTOLIC BLOOD PRESSURE: 130 MMHG

## 2021-11-12 DIAGNOSIS — E11.9 DIET-CONTROLLED TYPE 2 DIABETES MELLITUS (HCC): ICD-10-CM

## 2021-11-12 DIAGNOSIS — M25.562 CHRONIC PAIN OF LEFT KNEE: ICD-10-CM

## 2021-11-12 DIAGNOSIS — Z00.00 WELLNESS EXAMINATION: Primary | ICD-10-CM

## 2021-11-12 DIAGNOSIS — G89.29 CHRONIC PAIN OF LEFT KNEE: ICD-10-CM

## 2021-11-12 DIAGNOSIS — Z23 NEED FOR INFLUENZA VACCINATION: ICD-10-CM

## 2021-11-12 DIAGNOSIS — H81.09 MENIERE'S DISEASE, UNSPECIFIED LATERALITY: ICD-10-CM

## 2021-11-12 DIAGNOSIS — M54.50 CHRONIC BILATERAL LOW BACK PAIN WITHOUT SCIATICA: ICD-10-CM

## 2021-11-12 DIAGNOSIS — I65.23 BILATERAL CAROTID ARTERY STENOSIS: ICD-10-CM

## 2021-11-12 DIAGNOSIS — G89.29 CHRONIC BILATERAL LOW BACK PAIN WITHOUT SCIATICA: ICD-10-CM

## 2021-11-12 PROCEDURE — G0439 PPPS, SUBSEQ VISIT: HCPCS | Performed by: INTERNAL MEDICINE

## 2021-11-12 PROCEDURE — 90662 IIV NO PRSV INCREASED AG IM: CPT | Performed by: INTERNAL MEDICINE

## 2021-11-12 PROCEDURE — G0008 ADMIN INFLUENZA VIRUS VAC: HCPCS | Performed by: INTERNAL MEDICINE

## 2021-11-12 RX ORDER — ALBUTEROL SULFATE 90 UG/1
2 AEROSOL, METERED RESPIRATORY (INHALATION) EVERY 4 HOURS PRN
Qty: 6.7 G | Refills: 11 | Status: SHIPPED | OUTPATIENT
Start: 2021-11-12

## 2021-11-12 NOTE — PROGRESS NOTES
HPI:   Sukhjinder Cowart is a 66year old male who presents for a Medicare Subsequent Annual Wellness visit (Pt already had Initial Annual Wellness). Vertigo - has been taking triamterene-hydrochlorothiazide every 5th day and doing well.  He wants to stop PCP - General (Internal Medicine)  Eugene Patient, RD,LDN,CDE (Dietitian)  Loren Govea MD (GASTROENTEROLOGY)  Chrissie Sun (Cardiovascular Diseases)    Patient Active Problem List:     Rotator cuff syndrome of left shoulder     History of smoking once daily.        MEDICAL INFORMATION:   He  has a past medical history of Abdominal hernia (1973 and 2001), Arthritis, Back pain (2016), Diabetes ([facility] [address] Easy bruising (2019), Elevated lipase (11/25/2015), Flatulence/gas pain/belching (Randomly), Hernia, a Visual Acuity  Right Eye Visual Acuity: Uncorrected Right Eye Chart Acuity: 20/40   Left Eye Visual Acuity: Uncorrected Left Eye Chart Acuity: 20/30   Both Eyes Visual Acuity: Uncorrected Both Eyes Chart Acuity: 20/30   Able To Tolerate Visual Acui FREE    Chronic pain of left knee - start physical therapy and f/u ortho if symptoms persist   -     ORTHOPEDIC - INTERNAL  -     OP REFERRAL TO [facility]    Chronic bilateral low back pain without sciatica -start PT and gave handout FREQUENCY &  COVERAGE DETAILS LAST COMPLETION DATE   Diabetes Screening    Fasting Blood Sugar / Glucose    One screening every 12 months if never tested or if previously tested but not diagnosed with pre-diabetes   One screening every 6 months if diagnose Medicare Part B unless medically necessary (cut with metal); may be covered with your pharmacy prescription benefits -    Tetanus, Diptheria and Pertusis TD and TDaP Not covered by Medicare Part B -  No recommendations at this time    Zoster Not covered by

## 2021-11-22 ENCOUNTER — OFFICE VISIT (OUTPATIENT)
Dept: SURGERY | Facility: CLINIC | Age: 71
End: 2021-11-22
Payer: MEDICARE

## 2021-11-22 VITALS
WEIGHT: 215 LBS | HEART RATE: 70 BPM | BODY MASS INDEX: 33.35 KG/M2 | DIASTOLIC BLOOD PRESSURE: 62 MMHG | SYSTOLIC BLOOD PRESSURE: 130 MMHG | HEIGHT: 67.5 IN

## 2021-11-22 DIAGNOSIS — G89.29 CHRONIC LEFT-SIDED LOW BACK PAIN WITHOUT SCIATICA: Primary | ICD-10-CM

## 2021-11-22 DIAGNOSIS — M25.552 LEFT HIP PAIN: ICD-10-CM

## 2021-11-22 DIAGNOSIS — M54.50 CHRONIC LEFT-SIDED LOW BACK PAIN WITHOUT SCIATICA: Primary | ICD-10-CM

## 2021-11-22 DIAGNOSIS — G89.29 CHRONIC PAIN OF LEFT KNEE: ICD-10-CM

## 2021-11-22 DIAGNOSIS — M25.562 CHRONIC PAIN OF LEFT KNEE: ICD-10-CM

## 2021-11-22 PROBLEM — R23.8 EASY BRUISING: Status: ACTIVE | Noted: 2019-01-01

## 2021-11-22 PROBLEM — R23.3 EASY BRUISING: Status: ACTIVE | Noted: 2019-01-01

## 2021-11-22 PROBLEM — Z95.0 PACEMAKER: Status: ACTIVE | Noted: 2018-02-01

## 2021-11-22 PROCEDURE — 99214 OFFICE O/P EST MOD 30 MIN: CPT | Performed by: PHYSICIAN ASSISTANT

## 2021-11-22 NOTE — H&P
Neurosurgery Clinic Visit  2021    Dahiana Myers PCP:  Cory Farrell MD     MRN [id number]       CC:  Back Pain    HPI:    Priscilla Donahue is a very pleasant 66year old with PMH of a pacemaker who presents with chronic intermittent back pain.   He is 33.18 kg/m². General: No Apparent Distress, Well Nourished, Well Developed, Normal Voice, Mood Appropriate, Appears Stated Age  HEENT: No Scleral Icterus, Good Dentition, No Facial Asymmetry  Integumentary: Skin intact, no onychomycosis, no clubbing.

## 2021-11-22 NOTE — PROGRESS NOTES
Pt is here regarding: new patient, lower back pain    Pain level at this moment:  4/10    What 1 location is your pain most intense:  Has pain in his left side of lower back. Has no numbness/tinggling in the legs.  States at his worst when walking/sitting f

## 2021-11-29 ENCOUNTER — HOSPITAL ENCOUNTER (OUTPATIENT)
Dept: GENERAL RADIOLOGY | Age: 71
Discharge: HOME | End: 2021-11-29
Attending: PHYSICIAN ASSISTANT
Payer: MEDICARE

## 2021-11-29 DIAGNOSIS — G89.29 CHRONIC LEFT-SIDED LOW BACK PAIN WITHOUT SCIATICA: ICD-10-CM

## 2021-11-29 DIAGNOSIS — M54.50 CHRONIC LEFT-SIDED LOW BACK PAIN WITHOUT SCIATICA: ICD-10-CM

## 2021-11-29 DIAGNOSIS — M25.552 LEFT HIP PAIN: ICD-10-CM

## 2021-11-29 PROCEDURE — 73502 X-RAY EXAM HIP UNI 2-3 VIEWS: CPT | Performed by: PHYSICIAN ASSISTANT

## 2021-11-29 PROCEDURE — 72114 X-RAY EXAM L-S SPINE BENDING: CPT | Performed by: PHYSICIAN ASSISTANT

## 2021-12-14 ENCOUNTER — PATIENT MESSAGE (OUTPATIENT)
Dept: INTERNAL MEDICINE CLINIC | Facility: CLINIC | Age: 71
End: 2021-12-14

## 2021-12-14 NOTE — TELEPHONE ENCOUNTER
From: Mary Grace Beauchamp  To: Daniel Holcomb MD  Sent: 12/14/2021 9:03 AM CST  Subject: TRAZODONE 50MG    I guess I should read more carefully the medication guidelines the pharmacist gives me with my prescriptions.  I just noticed that the information I receive

## 2021-12-26 ENCOUNTER — PATIENT MESSAGE (OUTPATIENT)
Dept: INTERNAL MEDICINE CLINIC | Facility: CLINIC | Age: 71
End: 2021-12-26

## 2021-12-27 NOTE — TELEPHONE ENCOUNTER
From: Antonio Beauchamp  To: Mara Fraire MD  Sent: 12/26/2021 6:24 PM CST  Subject: COVID TEST RESULTS    My wife Latrell Cast is having problems logging into her [employer] so I am submitting this on her bahalf.     She had aCOVID-19 test performed earlier today and

## 2022-01-01 ENCOUNTER — MED REC SCAN ONLY (OUTPATIENT)
Dept: INTERNAL MEDICINE CLINIC | Facility: CLINIC | Age: 72
End: 2022-01-01

## 2022-01-03 ENCOUNTER — TELEPHONE (OUTPATIENT)
Dept: PHYSICAL THERAPY | Age: 72
End: 2022-01-03

## 2022-01-03 ENCOUNTER — OFFICE VISIT (OUTPATIENT)
Dept: PHYSICAL THERAPY | Age: 72
End: 2022-01-03
Attending: INTERNAL MEDICINE
Payer: MEDICARE

## 2022-01-03 DIAGNOSIS — M25.562 CHRONIC PAIN OF LEFT KNEE: ICD-10-CM

## 2022-01-03 DIAGNOSIS — G89.29 CHRONIC PAIN OF LEFT KNEE: ICD-10-CM

## 2022-01-03 DIAGNOSIS — M54.50 CHRONIC BILATERAL LOW BACK PAIN WITHOUT SCIATICA: ICD-10-CM

## 2022-01-03 DIAGNOSIS — G89.29 CHRONIC BILATERAL LOW BACK PAIN WITHOUT SCIATICA: ICD-10-CM

## 2022-01-03 NOTE — TELEPHONE ENCOUNTER
After pt left I emailed head of rehab to clarify COVID isolation guidelines as pt's wife is covid positive and he and she not isolating from each other.     I left a message notifying pt that his wife's day 0 was 12/26/21, her first sx; her day 10 is 1/5/22

## 2022-01-03 NOTE — PROGRESS NOTES
Pt came in today for his PT eval.  He stated that his wife had her first sx of COVID 12/26/21, he also had sx; all sx are resolved. Pt's wife did test positive for COVID after that. Pt's wife is not isolating.     I explained to patient EE COVID policy rozina

## 2022-01-06 ENCOUNTER — APPOINTMENT (OUTPATIENT)
Dept: PHYSICAL THERAPY | Age: 72
End: 2022-01-06
Attending: INTERNAL MEDICINE
Payer: MEDICARE

## 2022-01-10 ENCOUNTER — APPOINTMENT (OUTPATIENT)
Dept: PHYSICAL THERAPY | Age: 72
End: 2022-01-10
Attending: INTERNAL MEDICINE
Payer: MEDICARE

## 2022-01-14 ENCOUNTER — APPOINTMENT (OUTPATIENT)
Dept: PHYSICAL THERAPY | Age: 72
End: 2022-01-14
Attending: INTERNAL MEDICINE
Payer: MEDICARE

## 2022-01-17 ENCOUNTER — APPOINTMENT (OUTPATIENT)
Dept: PHYSICAL THERAPY | Age: 72
End: 2022-01-17
Attending: INTERNAL MEDICINE
Payer: MEDICARE

## 2022-01-18 ENCOUNTER — APPOINTMENT (OUTPATIENT)
Dept: PHYSICAL THERAPY | Age: 72
End: 2022-01-18
Attending: INTERNAL MEDICINE
Payer: MEDICARE

## 2022-01-18 ENCOUNTER — OFFICE VISIT (OUTPATIENT)
Dept: PHYSICAL THERAPY | Age: 72
End: 2022-01-18
Attending: INTERNAL MEDICINE
Payer: MEDICARE

## 2022-01-18 PROCEDURE — 97161 PT EVAL LOW COMPLEX 20 MIN: CPT

## 2022-01-18 PROCEDURE — 97110 THERAPEUTIC EXERCISES: CPT

## 2022-01-18 NOTE — PROGRESS NOTES
SPINE EVALUATION:   Referring Physician: Dr. Dashawn Manzo  Diagnosis: Chronic pain of left knee (M25.562,G89.29)  Chronic bilateral low back pain without sciatica (M54.50,G89.29)   Date of Service: 1/18/2022     PATIENT SUMMARY   Usman Vo is a 66 year o limitations include holding grandkids, floor transfers with grandkids, walking long distances, sit to stand transfers, donning and doffing shoes and foot hygiene. Nicole Armendariz describes prior level of function walking dog for more than a block.  Pt goals includ dominant, L shoulder lower, L iliac crest lower, mild thoracic kyphosis  Neuro Screen: Intact light touch     Trunk AROM: (* denotes performed with pain)  Flexion: 50%  Extension: 10 deg  Sidebending: R 15; L 15  Rotation: R 50%; L 25%    Hip ROM: 75% ROM be met in 12 visits)     Patient will demonstrate independence in performing home exercise program.  Patient will demonstrate increase hamstring flexibility to -20 degrees of knee extension measured at 70 hip flexion to reduce lumbar hip strain.    Patient

## 2022-01-20 ENCOUNTER — APPOINTMENT (OUTPATIENT)
Dept: PHYSICAL THERAPY | Age: 72
End: 2022-01-20
Attending: INTERNAL MEDICINE
Payer: MEDICARE

## 2022-01-27 ENCOUNTER — OFFICE VISIT (OUTPATIENT)
Dept: PHYSICAL THERAPY | Age: 72
End: 2022-01-27
Attending: INTERNAL MEDICINE
Payer: MEDICARE

## 2022-01-27 PROCEDURE — 97110 THERAPEUTIC EXERCISES: CPT

## 2022-01-27 PROCEDURE — 97140 MANUAL THERAPY 1/> REGIONS: CPT

## 2022-01-27 NOTE — PROGRESS NOTES
Dx:  Chronic pain of left knee (M25.562,G89.29)  Chronic bilateral low back pain without sciatica (M54.50,G89.29)     Insurance (Authorized # of Visits):  12 visits recommended           Authorizing Physician: Dr. Lashae Jack MD visit: none scheduled  Mary 5 B L only       Hip ER stretch 30 sec x 5 B         MT  Hip distraction neutral and increase hip ER       HEP:  1/27/2022 : hip flexor stretch and hamstring stretch, hip ER    Charges: Ex 2 ( 30 min) MT 15 min        Total Timed Treatment: 45 min  Total T lateral knee  Deny numbness and tingling,   Worse:  getting up from floor, being on floor, limited 1 block, sit to stand,   Better: Advil PM, rest, heat for back     Job: Retired - ,  [address] [place] - difficulty with shoulder. receive manual therapy for joint and soft tissue manipulation; neuromuscular re-education for hip control and balance with functional tasks  and therapeutic exercise for stretching and functional strength .      Precautions:  Pacemaker, diabetes  OBJECTIVE outcome measures, this evaluation involved High Complexity decision making due to 3+ personal factors/comorbidities, 3 body structures involved/activity limitations, and evolving symptoms including changing pain levels and loss of function including, trans Yes  I certify the need for these services furnished under this plan of treatment and while under my care.     X___________________________________________________ Date____________________     Certification From: 8/85/1484  To:4/18/2022

## 2022-01-28 ENCOUNTER — APPOINTMENT (OUTPATIENT)
Dept: PHYSICAL THERAPY | Age: 72
End: 2022-01-28
Attending: INTERNAL MEDICINE
Payer: MEDICARE

## 2022-01-31 ENCOUNTER — APPOINTMENT (OUTPATIENT)
Dept: PHYSICAL THERAPY | Age: 72
End: 2022-01-31
Attending: INTERNAL MEDICINE
Payer: MEDICARE

## 2022-02-01 ENCOUNTER — OFFICE VISIT (OUTPATIENT)
Dept: PHYSICAL THERAPY | Age: 72
End: 2022-02-01
Attending: INTERNAL MEDICINE
Payer: MEDICARE

## 2022-02-01 PROCEDURE — 97140 MANUAL THERAPY 1/> REGIONS: CPT

## 2022-02-01 PROCEDURE — 97110 THERAPEUTIC EXERCISES: CPT

## 2022-02-03 ENCOUNTER — OFFICE VISIT (OUTPATIENT)
Dept: PHYSICAL THERAPY | Age: 72
End: 2022-02-03
Attending: INTERNAL MEDICINE
Payer: MEDICARE

## 2022-02-03 PROCEDURE — 97110 THERAPEUTIC EXERCISES: CPT

## 2022-02-15 ENCOUNTER — APPOINTMENT (OUTPATIENT)
Dept: PHYSICAL THERAPY | Age: 72
End: 2022-02-15
Attending: INTERNAL MEDICINE
Payer: MEDICARE

## 2022-02-17 ENCOUNTER — APPOINTMENT (OUTPATIENT)
Dept: PHYSICAL THERAPY | Age: 72
End: 2022-02-17
Attending: INTERNAL MEDICINE
Payer: MEDICARE

## 2022-02-22 ENCOUNTER — OFFICE VISIT (OUTPATIENT)
Dept: PHYSICAL THERAPY | Age: 72
End: 2022-02-22
Attending: INTERNAL MEDICINE
Payer: MEDICARE

## 2022-02-22 PROCEDURE — 97140 MANUAL THERAPY 1/> REGIONS: CPT

## 2022-02-22 PROCEDURE — 97110 THERAPEUTIC EXERCISES: CPT

## 2022-02-24 ENCOUNTER — APPOINTMENT (OUTPATIENT)
Dept: PHYSICAL THERAPY | Age: 72
End: 2022-02-24
Attending: INTERNAL MEDICINE
Payer: MEDICARE

## 2022-03-01 ENCOUNTER — APPOINTMENT (OUTPATIENT)
Dept: PHYSICAL THERAPY | Age: 72
End: 2022-03-01
Attending: INTERNAL MEDICINE
Payer: MEDICARE

## 2022-03-03 ENCOUNTER — OFFICE VISIT (OUTPATIENT)
Dept: PHYSICAL THERAPY | Age: 72
End: 2022-03-03
Attending: INTERNAL MEDICINE
Payer: MEDICARE

## 2022-03-03 PROCEDURE — 97110 THERAPEUTIC EXERCISES: CPT

## 2022-03-10 ENCOUNTER — PATIENT MESSAGE (OUTPATIENT)
Dept: INTERNAL MEDICINE CLINIC | Facility: CLINIC | Age: 72
End: 2022-03-10

## 2022-03-12 NOTE — TELEPHONE ENCOUNTER
From: Gisela Beauchamp  To: Jeanette Hodges MD  Sent: 3/10/2022 5:22 PM CST  Subject: TRIAMTERENE 37.5 MG Refill    I stopped taking this medication after my appointment with you on 11/12/21. My last vertigo event was on 5/28/20. Over the following months I reduced the dosage from once a day to every other day, then every third day, every fourth day then finally every fifth day. I had a 3-hour vertigo event on 2/12/22 and a 4-hour event 3 days later, on 2/15. I restarted the medication on 2/12 and took 1 dose daily through 2/27. Then I went to every other day on 3/1. There has been no further vertigo recurrence since 2/15. I contacted [facility] a few days ago for a refill and I apparently failed to notice that there were no refills authorized. They told me they would contact you for authorization, but I haven't heard back from them. Can your office submit the appropriate authorization?

## 2022-03-13 RX ORDER — TRIAMTERENE AND HYDROCHLOROTHIAZIDE 37.5; 25 MG/1; MG/1
1 CAPSULE ORAL EVERY OTHER DAY
Qty: 90 CAPSULE | Refills: 0 | Status: SHIPPED | OUTPATIENT
Start: 2022-03-13

## 2022-05-22 ENCOUNTER — PATIENT MESSAGE (OUTPATIENT)
Dept: INTERNAL MEDICINE CLINIC | Facility: CLINIC | Age: 72
End: 2022-05-22

## 2022-05-22 DIAGNOSIS — E11.9 DIET-CONTROLLED TYPE 2 DIABETES MELLITUS (HCC): ICD-10-CM

## 2022-05-22 DIAGNOSIS — Z12.5 SCREENING FOR PROSTATE CANCER: ICD-10-CM

## 2022-05-22 DIAGNOSIS — E78.1 HYPERTRIGLYCERIDEMIA: Primary | ICD-10-CM

## 2022-05-23 NOTE — TELEPHONE ENCOUNTER
From: Juan Beauchamp  To: Olya Michael MD  Sent: 5/22/2022 5:34 PM CDT  Subject: Blood work up    In connection with my appointment for Friday, May 27, if I need a blood work up can you issue the order.

## 2022-05-25 ENCOUNTER — LAB ENCOUNTER (OUTPATIENT)
Dept: LAB | Age: 72
End: 2022-05-25
Attending: INTERNAL MEDICINE
Payer: MEDICARE

## 2022-05-25 DIAGNOSIS — E11.9 DIET-CONTROLLED TYPE 2 DIABETES MELLITUS (HCC): ICD-10-CM

## 2022-05-25 DIAGNOSIS — E78.1 HYPERTRIGLYCERIDEMIA: ICD-10-CM

## 2022-05-25 DIAGNOSIS — Z12.5 SCREENING FOR PROSTATE CANCER: ICD-10-CM

## 2022-05-25 LAB
ALBUMIN SERPL-MCNC: 3.8 G/DL (ref 3.4–5)
ALBUMIN/GLOB SERPL: 1.2 {RATIO} (ref 1–2)
ALP LIVER SERPL-CCNC: 51 U/L
ALT SERPL-CCNC: 22 U/L
ANION GAP SERPL CALC-SCNC: 4 MMOL/L (ref 0–18)
AST SERPL-CCNC: 24 U/L (ref 15–37)
BILIRUB SERPL-MCNC: 0.4 MG/DL (ref 0.1–2)
BUN BLD-MCNC: 21 MG/DL (ref 7–18)
CALCIUM BLD-MCNC: 8.9 MG/DL (ref 8.5–10.1)
CHLORIDE SERPL-SCNC: 108 MMOL/L (ref 98–112)
CHOLEST SERPL-MCNC: 78 MG/DL (ref ?–200)
CO2 SERPL-SCNC: 29 MMOL/L (ref 21–32)
COMPLEXED PSA SERPL-MCNC: 0.68 NG/ML (ref ?–4)
CREAT BLD-MCNC: 1.14 MG/DL
CREAT UR-SCNC: 147 MG/DL
EST. AVERAGE GLUCOSE BLD GHB EST-MCNC: 137 MG/DL (ref 68–126)
FASTING PATIENT LIPID ANSWER: NO
FASTING STATUS PATIENT QL REPORTED: NO
GLOBULIN PLAS-MCNC: 3.1 G/DL (ref 2.8–4.4)
GLUCOSE BLD-MCNC: 94 MG/DL (ref 70–99)
HBA1C MFR BLD: 6.4 % (ref ?–5.7)
HDLC SERPL-MCNC: 20 MG/DL (ref 40–59)
LDLC SERPL CALC-MCNC: 18 MG/DL (ref ?–100)
MICROALBUMIN UR-MCNC: 2.46 MG/DL
MICROALBUMIN/CREAT 24H UR-RTO: 16.7 UG/MG (ref ?–30)
NONHDLC SERPL-MCNC: 58 MG/DL (ref ?–130)
OSMOLALITY SERPL CALC.SUM OF ELEC: 295 MOSM/KG (ref 275–295)
POTASSIUM SERPL-SCNC: 5.3 MMOL/L (ref 3.5–5.1)
PROT SERPL-MCNC: 6.9 G/DL (ref 6.4–8.2)
SODIUM SERPL-SCNC: 141 MMOL/L (ref 136–145)
TRIGL SERPL-MCNC: 270 MG/DL (ref 30–149)
VLDLC SERPL CALC-MCNC: 34 MG/DL (ref 0–30)

## 2022-05-25 PROCEDURE — 36415 COLL VENOUS BLD VENIPUNCTURE: CPT

## 2022-05-25 PROCEDURE — 80061 LIPID PANEL: CPT

## 2022-05-25 PROCEDURE — 83036 HEMOGLOBIN GLYCOSYLATED A1C: CPT

## 2022-05-25 PROCEDURE — 82570 ASSAY OF URINE CREATININE: CPT

## 2022-05-25 PROCEDURE — 80053 COMPREHEN METABOLIC PANEL: CPT

## 2022-05-25 PROCEDURE — 82043 UR ALBUMIN QUANTITATIVE: CPT

## 2022-05-27 ENCOUNTER — OFFICE VISIT (OUTPATIENT)
Dept: INTERNAL MEDICINE CLINIC | Facility: CLINIC | Age: 72
End: 2022-05-27
Payer: MEDICARE

## 2022-05-27 VITALS
BODY MASS INDEX: 33.88 KG/M2 | SYSTOLIC BLOOD PRESSURE: 132 MMHG | RESPIRATION RATE: 16 BRPM | TEMPERATURE: 98 F | DIASTOLIC BLOOD PRESSURE: 60 MMHG | HEART RATE: 78 BPM | OXYGEN SATURATION: 98 % | HEIGHT: 67.5 IN | WEIGHT: 218.38 LBS

## 2022-05-27 DIAGNOSIS — Z95.0 PRESENCE OF CARDIAC PACEMAKER: ICD-10-CM

## 2022-05-27 DIAGNOSIS — Z87.891 HISTORY OF SMOKING 25-50 PACK YEARS: ICD-10-CM

## 2022-05-27 DIAGNOSIS — E11.9 DIET-CONTROLLED TYPE 2 DIABETES MELLITUS (HCC): Primary | ICD-10-CM

## 2022-05-27 DIAGNOSIS — K40.90 RIGHT INGUINAL HERNIA: ICD-10-CM

## 2022-05-27 DIAGNOSIS — Z12.2 SCREENING FOR LUNG CANCER: ICD-10-CM

## 2022-05-27 PROCEDURE — 99213 OFFICE O/P EST LOW 20 MIN: CPT | Performed by: INTERNAL MEDICINE

## 2022-05-27 RX ORDER — CHLORHEXIDINE GLUCONATE 0.12 MG/ML
RINSE ORAL
COMMUNITY

## 2022-05-27 RX ORDER — LATANOPROST 50 UG/ML
1 SOLUTION/ DROPS OPHTHALMIC NIGHTLY
COMMUNITY
Start: 2022-05-25

## 2022-06-13 ENCOUNTER — PATIENT MESSAGE (OUTPATIENT)
Dept: INTERNAL MEDICINE CLINIC | Facility: CLINIC | Age: 72
End: 2022-06-13

## 2022-06-13 NOTE — TELEPHONE ENCOUNTER
From: Mark Anthony Beauchamp  To: Marcel Lepe MD  Sent: 6/13/2022 8:43 AM CDT  Subject: PRESCRIPTION REFILL    I need a refill for TRAZODONE 50MG TABLETS. I have 2 doses left.

## 2022-06-14 RX ORDER — TRAZODONE HYDROCHLORIDE 50 MG/1
50 TABLET ORAL NIGHTLY
Qty: 90 TABLET | Refills: 1 | Status: SHIPPED | OUTPATIENT
Start: 2022-06-14

## 2022-06-21 ENCOUNTER — OFFICE VISIT (OUTPATIENT)
Facility: LOCATION | Age: 72
End: 2022-06-21
Payer: MEDICARE

## 2022-06-21 VITALS
BODY MASS INDEX: 33.82 KG/M2 | HEART RATE: 63 BPM | TEMPERATURE: 98 F | WEIGHT: 218 LBS | SYSTOLIC BLOOD PRESSURE: 146 MMHG | HEIGHT: 67.5 IN | DIASTOLIC BLOOD PRESSURE: 78 MMHG

## 2022-06-21 DIAGNOSIS — E11.9 DIET-CONTROLLED TYPE 2 DIABETES MELLITUS (HCC): ICD-10-CM

## 2022-06-21 DIAGNOSIS — Z95.0 PRESENCE OF CARDIAC PACEMAKER: ICD-10-CM

## 2022-06-21 DIAGNOSIS — K40.90 RIGHT INGUINAL HERNIA: Primary | ICD-10-CM

## 2022-06-21 PROCEDURE — 99204 OFFICE O/P NEW MOD 45 MIN: CPT | Performed by: SURGERY

## 2022-07-07 ENCOUNTER — HOSPITAL ENCOUNTER (OUTPATIENT)
Dept: CT IMAGING | Age: 72
Discharge: HOME | End: 2022-07-07
Attending: STUDENT IN AN ORGANIZED HEALTH CARE EDUCATION/TRAINING PROGRAM
Payer: MEDICARE

## 2022-07-07 DIAGNOSIS — E11.9 DIET-CONTROLLED TYPE 2 DIABETES MELLITUS (HCC): ICD-10-CM

## 2022-07-07 DIAGNOSIS — Z95.0 PRESENCE OF CARDIAC PACEMAKER: ICD-10-CM

## 2022-07-07 DIAGNOSIS — K40.90 RIGHT INGUINAL HERNIA: ICD-10-CM

## 2022-07-07 LAB — CREAT BLD-MCNC: 1.3 MG/DL

## 2022-07-07 PROCEDURE — 82565 ASSAY OF CREATININE: CPT

## 2022-07-07 PROCEDURE — 74177 CT ABD & PELVIS W/CONTRAST: CPT | Performed by: SURGERY

## 2022-07-11 ENCOUNTER — PATIENT MESSAGE (OUTPATIENT)
Dept: INTERNAL MEDICINE CLINIC | Facility: CLINIC | Age: 72
End: 2022-07-11

## 2022-07-11 DIAGNOSIS — M25.531 ACUTE PAIN OF RIGHT WRIST: Primary | ICD-10-CM

## 2022-07-12 ENCOUNTER — TELEPHONE (OUTPATIENT)
Dept: ORTHOPEDICS CLINIC | Facility: CLINIC | Age: 72
End: 2022-07-12

## 2022-07-12 DIAGNOSIS — M79.641 RIGHT HAND PAIN: Primary | ICD-10-CM

## 2022-07-12 NOTE — TELEPHONE ENCOUNTER
Please enter an Xray RX for a RT THUMB for  this patient's upcoming appointment, as the patient has not had any imaging completed. Patient was instructed to get X-rays before appt. PLEASE REPLY TO THIS MESSAGE when the order is put in EPIC so that I can schedule the Xray appt. appx. 30 minutes before his appt. Thank you, in advance!     Future Appointments   Date Time Provider Osorio Chiang   7/25/2022  1:20 PM Jenny Arriaga [facility] [place] [id number]   8/12/2022  7:30 AM PF CT RM1 PF CT [place]   9/27/2022  9:00 AM Chasity Woody MD [facility]   9/29/2022  8:00 AM Sonja Reynolds MD [facility] [address]

## 2022-07-12 NOTE — TELEPHONE ENCOUNTER
Reviewed patients chart, xray orders are required. Order placed for right hand xrays.  Please  schedule patients xray appt-Thank you

## 2022-07-12 NOTE — TELEPHONE ENCOUNTER
From: Emiliano Beauchamp  To: Laura Genao MD  Sent: 7/11/2022 9:11 AM CDT  Subject: Orthopaedics    My right wrist has been bothersome for the past several months but lately it is giving me increasing pain centered at the base of my thumb. I have begun wearing a wrist brace and taking OTC pain medications at bedtime. I get that this is just a temporary fix and what I really need is a more professional diagnosis of the issue. Can you recommend a dr. who addresses hand problems?

## 2022-07-25 ENCOUNTER — HOSPITAL ENCOUNTER (OUTPATIENT)
Dept: GENERAL RADIOLOGY | Age: 72
Discharge: HOME | End: 2022-07-25
Attending: PHYSICIAN ASSISTANT
Payer: MEDICARE

## 2022-07-25 ENCOUNTER — OFFICE VISIT (OUTPATIENT)
Dept: ORTHOPEDICS CLINIC | Facility: CLINIC | Age: 72
End: 2022-07-25
Payer: MEDICARE

## 2022-07-25 VITALS — HEIGHT: 67 IN | WEIGHT: 218 LBS | BODY MASS INDEX: 34.21 KG/M2

## 2022-07-25 DIAGNOSIS — M79.641 RIGHT HAND PAIN: ICD-10-CM

## 2022-07-25 DIAGNOSIS — M18.11 PRIMARY OSTEOARTHRITIS OF FIRST CARPOMETACARPAL JOINT OF RIGHT HAND: Primary | ICD-10-CM

## 2022-07-25 PROCEDURE — 73130 X-RAY EXAM OF HAND: CPT | Performed by: PHYSICIAN ASSISTANT

## 2022-07-25 RX ORDER — TRIAMCINOLONE ACETONIDE 40 MG/ML
40 INJECTION, SUSPENSION INTRA-ARTICULAR; INTRAMUSCULAR ONCE
Status: COMPLETED | OUTPATIENT
Start: 2022-07-25 | End: 2022-07-25

## 2022-07-25 RX ADMIN — TRIAMCINOLONE ACETONIDE 40 MG: 40 INJECTION, SUSPENSION INTRA-ARTICULAR; INTRAMUSCULAR at 13:35:00

## 2022-08-12 ENCOUNTER — HOSPITAL ENCOUNTER (OUTPATIENT)
Dept: CT IMAGING | Age: 72
Discharge: HOME | End: 2022-08-12
Attending: INTERNAL MEDICINE
Payer: MEDICARE

## 2022-08-12 DIAGNOSIS — Z87.891 HISTORY OF SMOKING 25-50 PACK YEARS: ICD-10-CM

## 2022-08-12 PROCEDURE — 71271 CT THORAX LUNG CANCER SCR C-: CPT | Performed by: INTERNAL MEDICINE

## 2022-08-31 DIAGNOSIS — K40.91 UNILATERAL RECURRENT INGUINAL HERNIA WITHOUT OBSTRUCTION OR GANGRENE: Primary | ICD-10-CM

## 2022-09-29 ENCOUNTER — OFFICE VISIT (OUTPATIENT)
Dept: SURGERY | Facility: CLINIC | Age: 72
End: 2022-09-29
Payer: MEDICARE

## 2022-09-29 DIAGNOSIS — N50.89 SCROTAL MASS: ICD-10-CM

## 2022-09-29 DIAGNOSIS — N43.3 RIGHT HYDROCELE: Primary | ICD-10-CM

## 2022-09-29 PROCEDURE — 99203 OFFICE O/P NEW LOW 30 MIN: CPT | Performed by: UROLOGY

## 2022-10-03 ENCOUNTER — HOSPITAL ENCOUNTER (OUTPATIENT)
Dept: ULTRASOUND IMAGING | Age: 72
Discharge: HOME | End: 2022-10-03
Attending: UROLOGY
Payer: MEDICARE

## 2022-10-03 ENCOUNTER — TELEPHONE (OUTPATIENT)
Dept: SURGERY | Facility: CLINIC | Age: 72
End: 2022-10-03

## 2022-10-03 DIAGNOSIS — N43.40 SPERMATOCELE: Primary | ICD-10-CM

## 2022-10-03 DIAGNOSIS — N43.3 RIGHT HYDROCELE: ICD-10-CM

## 2022-10-03 PROCEDURE — 76870 US EXAM SCROTUM: CPT | Performed by: UROLOGY

## 2022-10-03 PROCEDURE — 93975 VASCULAR STUDY: CPT | Performed by: UROLOGY

## 2022-10-04 NOTE — TELEPHONE ENCOUNTER
[facility]  Assistant:   Location: right  Procedure: right spermatocelectomy  Anesthesia: general  Time frame: 45minutes  Diagnosis: right spermatocele    Special instructions/equipment:  Postop follow-up:

## 2022-10-04 NOTE — TELEPHONE ENCOUNTER
S/w patient. Requested next week if possible. Not taking any blood thinners. Reviewed pre op. [facility] sent with information. Patient scheduled for 10/12. Pending sale to [facility] case request sent.

## 2022-10-07 RX ORDER — SODIUM CHLORIDE, SODIUM LACTATE, POTASSIUM CHLORIDE, CALCIUM CHLORIDE 600; 310; 30; 20 MG/100ML; MG/100ML; MG/100ML; MG/100ML
INJECTION, SOLUTION INTRAVENOUS CONTINUOUS
Status: CANCELLED | OUTPATIENT
Start: 2022-10-07

## 2022-10-07 RX ORDER — ACETAMINOPHEN 500 MG
1000 TABLET ORAL ONCE
Status: CANCELLED | OUTPATIENT
Start: 2022-10-07 | End: 2022-10-07

## 2022-10-10 ENCOUNTER — LAB ENCOUNTER (OUTPATIENT)
Dept: LAB | Age: 72
End: 2022-10-10
Attending: PHYSICIAN ASSISTANT
Payer: MEDICARE

## 2022-10-10 DIAGNOSIS — N43.40 SPERMATOCELE: ICD-10-CM

## 2022-10-11 LAB — SARS-COV-2 RNA RESP QL NAA+PROBE: NOT DETECTED

## 2022-10-11 NOTE — PAT NURSING NOTE
Fax sent to Dr Celeste Adam office regarding patient's pacemaker Device plan. Fax confirmation received. Telephoned Dr. Nancy Cornelius office and spoke with Anh Alvarez who states that she will send an urgent message to his office regarding the need for [facility] plan needed for surgery tomorrow 10/12/22.

## 2022-10-12 ENCOUNTER — TELEPHONE (OUTPATIENT)
Dept: SURGERY | Facility: CLINIC | Age: 72
End: 2022-10-12

## 2022-10-12 ENCOUNTER — ANESTHESIA EVENT (OUTPATIENT)
Dept: SURGERY | Facility: HOSPITAL | Age: 72
End: 2022-10-12
Payer: MEDICARE

## 2022-10-12 ENCOUNTER — HOSPITAL ENCOUNTER (OUTPATIENT)
Facility: HOSPITAL | Age: 72
Setting detail: HOSPITAL OUTPATIENT SURGERY
Discharge: HOME | End: 2022-10-12
Attending: UROLOGY | Admitting: UROLOGY
Payer: MEDICARE

## 2022-10-12 ENCOUNTER — ANESTHESIA (OUTPATIENT)
Dept: SURGERY | Facility: HOSPITAL | Age: 72
End: 2022-10-12
Payer: MEDICARE

## 2022-10-12 VITALS
HEART RATE: 72 BPM | OXYGEN SATURATION: 93 % | BODY MASS INDEX: 33.12 KG/M2 | SYSTOLIC BLOOD PRESSURE: 123 MMHG | DIASTOLIC BLOOD PRESSURE: 67 MMHG | RESPIRATION RATE: 18 BRPM | HEIGHT: 67 IN | WEIGHT: 211 LBS | TEMPERATURE: 98 F

## 2022-10-12 DIAGNOSIS — N43.40 SPERMATOCELE: ICD-10-CM

## 2022-10-12 PROCEDURE — 0VB60ZZ EXCISION OF RIGHT TUNICA VAGINALIS, OPEN APPROACH: ICD-10-PCS | Performed by: UROLOGY

## 2022-10-12 PROCEDURE — 55040 REMOVAL OF HYDROCELE: CPT | Performed by: UROLOGY

## 2022-10-12 RX ORDER — HYDROCODONE BITARTRATE AND ACETAMINOPHEN 5; 325 MG/1; MG/1
1 TABLET ORAL ONCE AS NEEDED
Status: DISCONTINUED | OUTPATIENT
Start: 2022-10-12 | End: 2022-10-12

## 2022-10-12 RX ORDER — LIDOCAINE HYDROCHLORIDE 10 MG/ML
INJECTION, SOLUTION EPIDURAL; INFILTRATION; INTRACAUDAL; PERINEURAL AS NEEDED
Status: DISCONTINUED | OUTPATIENT
Start: 2022-10-12 | End: 2022-10-12 | Stop reason: SURG

## 2022-10-12 RX ORDER — ACETAMINOPHEN 500 MG
1000 TABLET ORAL ONCE
Status: DISCONTINUED | OUTPATIENT
Start: 2022-10-12 | End: 2022-10-12 | Stop reason: HOSPADM

## 2022-10-12 RX ORDER — ONDANSETRON 2 MG/ML
4 INJECTION INTRAMUSCULAR; INTRAVENOUS EVERY 6 HOURS PRN
Status: DISCONTINUED | OUTPATIENT
Start: 2022-10-12 | End: 2022-10-12

## 2022-10-12 RX ORDER — ACETAMINOPHEN 500 MG
1000 TABLET ORAL ONCE AS NEEDED
Status: DISCONTINUED | OUTPATIENT
Start: 2022-10-12 | End: 2022-10-12

## 2022-10-12 RX ORDER — ONDANSETRON 2 MG/ML
INJECTION INTRAMUSCULAR; INTRAVENOUS AS NEEDED
Status: DISCONTINUED | OUTPATIENT
Start: 2022-10-12 | End: 2022-10-12 | Stop reason: SURG

## 2022-10-12 RX ORDER — CEFAZOLIN SODIUM/WATER 2 G/20 ML
2 SYRINGE (ML) INTRAVENOUS ONCE
Status: COMPLETED | OUTPATIENT
Start: 2022-10-12 | End: 2022-10-12

## 2022-10-12 RX ORDER — NICOTINE POLACRILEX 4 MG
30 LOZENGE BUCCAL
Status: DISCONTINUED | OUTPATIENT
Start: 2022-10-12 | End: 2022-10-12

## 2022-10-12 RX ORDER — DEXAMETHASONE SODIUM PHOSPHATE 4 MG/ML
VIAL (ML) INJECTION AS NEEDED
Status: DISCONTINUED | OUTPATIENT
Start: 2022-10-12 | End: 2022-10-12 | Stop reason: SURG

## 2022-10-12 RX ORDER — DEXTROSE MONOHYDRATE 25 G/50ML
50 INJECTION, SOLUTION INTRAVENOUS
Status: DISCONTINUED | OUTPATIENT
Start: 2022-10-12 | End: 2022-10-12

## 2022-10-12 RX ORDER — SODIUM CHLORIDE, SODIUM LACTATE, POTASSIUM CHLORIDE, CALCIUM CHLORIDE 600; 310; 30; 20 MG/100ML; MG/100ML; MG/100ML; MG/100ML
INJECTION, SOLUTION INTRAVENOUS CONTINUOUS
Status: DISCONTINUED | OUTPATIENT
Start: 2022-10-12 | End: 2022-10-12

## 2022-10-12 RX ORDER — NICOTINE POLACRILEX 4 MG
15 LOZENGE BUCCAL
Status: DISCONTINUED | OUTPATIENT
Start: 2022-10-12 | End: 2022-10-12

## 2022-10-12 RX ORDER — HYDROCODONE BITARTRATE AND ACETAMINOPHEN 5; 325 MG/1; MG/1
2 TABLET ORAL ONCE AS NEEDED
Status: DISCONTINUED | OUTPATIENT
Start: 2022-10-12 | End: 2022-10-12

## 2022-10-12 RX ORDER — HYDROMORPHONE HYDROCHLORIDE 1 MG/ML
0.4 INJECTION, SOLUTION INTRAMUSCULAR; INTRAVENOUS; SUBCUTANEOUS EVERY 5 MIN PRN
Status: DISCONTINUED | OUTPATIENT
Start: 2022-10-12 | End: 2022-10-12

## 2022-10-12 RX ORDER — CEPHALEXIN 500 MG/1
CAPSULE ORAL
Qty: 21 CAPSULE | Refills: 1 | Status: SHIPPED | OUTPATIENT
Start: 2022-10-12

## 2022-10-12 RX ORDER — METOCLOPRAMIDE HYDROCHLORIDE 5 MG/ML
10 INJECTION INTRAMUSCULAR; INTRAVENOUS EVERY 8 HOURS PRN
Status: DISCONTINUED | OUTPATIENT
Start: 2022-10-12 | End: 2022-10-12

## 2022-10-12 RX ORDER — HYDROMORPHONE HYDROCHLORIDE 1 MG/ML
0.2 INJECTION, SOLUTION INTRAMUSCULAR; INTRAVENOUS; SUBCUTANEOUS EVERY 5 MIN PRN
Status: DISCONTINUED | OUTPATIENT
Start: 2022-10-12 | End: 2022-10-12

## 2022-10-12 RX ORDER — NALOXONE HYDROCHLORIDE 0.4 MG/ML
80 INJECTION, SOLUTION INTRAMUSCULAR; INTRAVENOUS; SUBCUTANEOUS AS NEEDED
Status: DISCONTINUED | OUTPATIENT
Start: 2022-10-12 | End: 2022-10-12

## 2022-10-12 RX ORDER — HYDROCODONE BITARTRATE AND ACETAMINOPHEN 5; 325 MG/1; MG/1
1-2 TABLET ORAL EVERY 4 HOURS PRN
Qty: 20 TABLET | Refills: 0 | Status: SHIPPED | OUTPATIENT
Start: 2022-10-12

## 2022-10-12 RX ORDER — HYDROMORPHONE HYDROCHLORIDE 1 MG/ML
0.6 INJECTION, SOLUTION INTRAMUSCULAR; INTRAVENOUS; SUBCUTANEOUS EVERY 5 MIN PRN
Status: DISCONTINUED | OUTPATIENT
Start: 2022-10-12 | End: 2022-10-12

## 2022-10-12 RX ADMIN — SODIUM CHLORIDE, SODIUM LACTATE, POTASSIUM CHLORIDE, CALCIUM CHLORIDE: 600; 310; 30; 20 INJECTION, SOLUTION INTRAVENOUS at 15:52:00

## 2022-10-12 RX ADMIN — ONDANSETRON 4 MG: 2 INJECTION INTRAMUSCULAR; INTRAVENOUS at 16:55:00

## 2022-10-12 RX ADMIN — CEFAZOLIN SODIUM/WATER 2 G: 2 G/20 ML SYRINGE (ML) INTRAVENOUS at 15:57:00

## 2022-10-12 RX ADMIN — SODIUM CHLORIDE, SODIUM LACTATE, POTASSIUM CHLORIDE, CALCIUM CHLORIDE: 600; 310; 30; 20 INJECTION, SOLUTION INTRAVENOUS at 17:32:00

## 2022-10-12 RX ADMIN — DEXAMETHASONE SODIUM PHOSPHATE 8 MG: 4 MG/ML VIAL (ML) INJECTION at 15:58:00

## 2022-10-12 RX ADMIN — LIDOCAINE HYDROCHLORIDE 50 MG: 10 INJECTION, SOLUTION EPIDURAL; INFILTRATION; INTRACAUDAL; PERINEURAL at 15:54:00

## 2022-10-12 NOTE — INTERVAL H&P NOTE
Pre-op Diagnosis: Spermatocele [N43.40]    The above referenced H&P was reviewed by Indra Garcia MD on 10/12/2022, the patient was examined and no significant changes have occurred in the patient's condition since the H&P was performed. I discussed with the patient and/or legal representative the potential benefits, risks and side effects of this procedure; the likelihood of the patient achieving goals; and potential problems that might occur during recuperation. I discussed reasonable alternatives to the procedure, including risks, benefits and side effects related to the alternatives and risks related to not receiving this procedure. We will proceed with procedure as planned.

## 2022-10-12 NOTE — TELEPHONE ENCOUNTER
Have patient come in on Monday for Enid Labrum or nurse visit to remove his drain from the right scrotum. Sutured in place with a silk suture which needs to be removed. Schedule follow-up with me 7 to 10 days later.

## 2022-10-12 NOTE — OPERATIVE REPORT
Operative Note    Patient Name: Mayra Gabriel [facility]    Date of Procedure: 10/12/2022    Preoperative Diagnosis: Right spermatocele [N43.40]    Postoperative Diagnosis: Right hydrocele    Primary Surgeon: Endy Sanchez. Eva Cat M.D. Procedure Performed: Right scrotal exploration and right hydrocelectomy    Anesthesia: General    Indications: This 78-year-old man has a history of an enlarged heavy and swollen right hemiscrotum for many years after a hernia surgery. Recent scrotal ultrasound suggested a right spermatocele. Because of the large size of this mass it was recommended and the patient requested surgical excision. The procedure was discussed with the patient in the office and reviewed with the patient and spouse in the preanesthetic area and he understands and wishes to proceed    Procedure: The patient was brought into the operating room placed on the operating table in supine position after having been given general anesthesia by Dr. Thor Casanova. Then the scrotum and genitalia were prepped and draped in usual fashion. Standard universal intraoperative timeout was taken at which time all members of the operative team paused to review the procedure to be performed and concurred noting that the patient had been marked on the right, the correct side. Then an incision was then made through the median raphae exposing and incising the dartos muscle fibers exposing the tunica vaginalis of the right testicle. The entire cystic mass was extruded through the incision carefully explored and ultimately opened draining a large amount of chavo fluid and thus identifying a hydrocele. The redundant hydrocele tissue was excised. It was somewhat thickened at least and a portion of the hydrocele sac. The edges of the hydrocele sac that remained were cauterized with electrocautery and oversewn using running 4-0 Vicryl and 4-0 chromic sutures in an interlocking fashion. Hemostasis was meticulous.   Ultimately then there was no evidence of any spermatocele. The right testicle was then replaced into the right hemiscrotal compartment. The dartos muscle fibers were reapproximated using continuous running 4-0 chromic suture. Skin edges were closed with 3-0 chromic suture in simple fashion. A 10 Turkish suction drain had been left into the right hemiscrotal compartment for suction drainage. It was sutured to the skin with a silk suture. The wound was then cleaned, Adaptic dressing and fluffs were then placed over the scrotal incision and then an athletic supporter. At the completion of the procedure all needle instrument lap and sponge counts were correct. Estimated blood loss was probably no more than 10 cc. The patient was returned to recovery room in stable and satisfactory condition given the usual postoperative instructions and will have the drain left in place until Monday. It will be removed by staff in the office. Endy Sanchez.  Eva Cat M.D.

## 2022-10-12 NOTE — ANESTHESIA PROCEDURE NOTES
Airway  Date/Time: 10/12/2022 3:55 PM  Urgency: Elective      General Information and Staff    Patient location during procedure: OR  Anesthesiologist: Andrea Anguiano MD  Resident/CRNA: Florian Allen CRNA  Performed: CRNA     Indications and Patient Condition  Indications for airway management: anesthesia  Sedation level: deep  Preoxygenated: yes  Patient position: sniffing  Mask difficulty assessment: 0 - not attempted    Final Airway Details  Final airway type: supraglottic airway      Successful airway: classic  Size 4      Number of attempts at approach: 1    Additional Comments  Atraumatic intubation, dentition and lips as preop

## 2022-10-14 NOTE — TELEPHONE ENCOUNTER
Called pt and scheduled him to see Eliceo ALCANTAR for drainage removal Monday 10/17 at 0930. Verified with Eliceo Millan.      Future Appointments   Date Time Provider Osorio Mariia   10/17/2022  9:30 AM Katie Paul.[facility]     '

## 2022-10-17 ENCOUNTER — OFFICE VISIT (OUTPATIENT)
Dept: SURGERY | Facility: CLINIC | Age: 72
End: 2022-10-17
Payer: MEDICARE

## 2022-10-17 DIAGNOSIS — N43.40 SPERMATOCELE: Primary | ICD-10-CM

## 2022-10-17 NOTE — PROGRESS NOTES
Your recent surgical excision of the hydrocele sac is benign. Recommend follow up in the office as directed.     Sincerely,  Chiara Silva MD

## 2022-10-27 ENCOUNTER — OFFICE VISIT (OUTPATIENT)
Dept: SURGERY | Facility: CLINIC | Age: 72
End: 2022-10-27
Payer: MEDICARE

## 2022-10-27 DIAGNOSIS — N43.3 RIGHT HYDROCELE: ICD-10-CM

## 2022-10-27 DIAGNOSIS — Z09 POSTOP CHECK: Primary | ICD-10-CM

## 2022-10-27 PROCEDURE — 99024 POSTOP FOLLOW-UP VISIT: CPT | Performed by: UROLOGY

## 2022-11-15 ENCOUNTER — PATIENT MESSAGE (OUTPATIENT)
Dept: INTERNAL MEDICINE CLINIC | Facility: CLINIC | Age: 72
End: 2022-11-15

## 2022-11-15 DIAGNOSIS — E78.1 HYPERTRIGLYCERIDEMIA: ICD-10-CM

## 2022-11-15 DIAGNOSIS — E11.9 DIET-CONTROLLED TYPE 2 DIABETES MELLITUS (HCC): Primary | ICD-10-CM

## 2022-11-16 NOTE — TELEPHONE ENCOUNTER
From: Emiliano Beauchamp  To: Laura Genao MD  Sent: 11/15/2022 5:48 PM CST  Subject: BLOOD WORK    I have an appointment with you on the 28th. Are you going to order blood work?

## 2022-11-22 ENCOUNTER — LAB ENCOUNTER (OUTPATIENT)
Dept: LAB | Age: 72
End: 2022-11-22
Attending: INTERNAL MEDICINE
Payer: MEDICARE

## 2022-11-22 DIAGNOSIS — E11.9 DIET-CONTROLLED TYPE 2 DIABETES MELLITUS (HCC): ICD-10-CM

## 2022-11-22 DIAGNOSIS — E78.1 HYPERTRIGLYCERIDEMIA: ICD-10-CM

## 2022-11-22 LAB
ALBUMIN SERPL-MCNC: 3.7 G/DL (ref 3.4–5)
ALBUMIN/GLOB SERPL: 1.2 {RATIO} (ref 1–2)
ALP LIVER SERPL-CCNC: 59 U/L
ALT SERPL-CCNC: 18 U/L
ANION GAP SERPL CALC-SCNC: 5 MMOL/L (ref 0–18)
AST SERPL-CCNC: 12 U/L (ref 15–37)
BILIRUB SERPL-MCNC: 0.5 MG/DL (ref 0.1–2)
BUN BLD-MCNC: 18 MG/DL (ref 7–18)
CALCIUM BLD-MCNC: 8.8 MG/DL (ref 8.5–10.1)
CHLORIDE SERPL-SCNC: 108 MMOL/L (ref 98–112)
CHOLEST SERPL-MCNC: 92 MG/DL (ref ?–200)
CO2 SERPL-SCNC: 30 MMOL/L (ref 21–32)
CREAT BLD-MCNC: 1.11 MG/DL
EST. AVERAGE GLUCOSE BLD GHB EST-MCNC: 134 MG/DL (ref 68–126)
FASTING PATIENT LIPID ANSWER: YES
FASTING STATUS PATIENT QL REPORTED: YES
GFR SERPLBLD BASED ON 1.73 SQ M-ARVRAT: 70 ML/MIN/1.73M2 (ref 60–?)
GLOBULIN PLAS-MCNC: 3.1 G/DL (ref 2.8–4.4)
GLUCOSE BLD-MCNC: 114 MG/DL (ref 70–99)
HBA1C MFR BLD: 6.3 % (ref ?–5.7)
HDLC SERPL-MCNC: 25 MG/DL (ref 40–59)
LDLC SERPL CALC-MCNC: 35 MG/DL (ref ?–100)
NONHDLC SERPL-MCNC: 67 MG/DL (ref ?–130)
OSMOLALITY SERPL CALC.SUM OF ELEC: 299 MOSM/KG (ref 275–295)
POTASSIUM SERPL-SCNC: 4.8 MMOL/L (ref 3.5–5.1)
PROT SERPL-MCNC: 6.8 G/DL (ref 6.4–8.2)
SODIUM SERPL-SCNC: 143 MMOL/L (ref 136–145)
TRIGL SERPL-MCNC: 198 MG/DL (ref 30–149)
VLDLC SERPL CALC-MCNC: 27 MG/DL (ref 0–30)

## 2022-11-22 PROCEDURE — 83036 HEMOGLOBIN GLYCOSYLATED A1C: CPT

## 2022-11-22 PROCEDURE — 80061 LIPID PANEL: CPT

## 2022-11-22 PROCEDURE — 36415 COLL VENOUS BLD VENIPUNCTURE: CPT

## 2022-11-22 PROCEDURE — 80053 COMPREHEN METABOLIC PANEL: CPT

## 2022-11-28 ENCOUNTER — OFFICE VISIT (OUTPATIENT)
Dept: INTERNAL MEDICINE CLINIC | Facility: CLINIC | Age: 72
End: 2022-11-28
Payer: MEDICARE

## 2022-11-28 VITALS
TEMPERATURE: 98 F | BODY MASS INDEX: 33.18 KG/M2 | SYSTOLIC BLOOD PRESSURE: 122 MMHG | OXYGEN SATURATION: 97 % | HEART RATE: 79 BPM | WEIGHT: 211.38 LBS | RESPIRATION RATE: 16 BRPM | HEIGHT: 67 IN | DIASTOLIC BLOOD PRESSURE: 68 MMHG

## 2022-11-28 DIAGNOSIS — I73.9 PVD (PERIPHERAL VASCULAR DISEASE) (HCC): ICD-10-CM

## 2022-11-28 DIAGNOSIS — Z00.00 WELLNESS EXAMINATION: Primary | ICD-10-CM

## 2022-11-28 DIAGNOSIS — Z12.5 SCREENING FOR PROSTATE CANCER: ICD-10-CM

## 2022-11-28 DIAGNOSIS — E78.1 HYPERTRIGLYCERIDEMIA: ICD-10-CM

## 2022-11-28 DIAGNOSIS — Z78.9 UNKNOWN VARICELLA VACCINATION STATUS: ICD-10-CM

## 2022-11-28 DIAGNOSIS — E11.9 DIET-CONTROLLED TYPE 2 DIABETES MELLITUS (HCC): ICD-10-CM

## 2022-11-28 DIAGNOSIS — I65.23 BILATERAL CAROTID ARTERY STENOSIS: ICD-10-CM

## 2022-11-28 PROCEDURE — G0439 PPPS, SUBSEQ VISIT: HCPCS | Performed by: INTERNAL MEDICINE

## 2022-11-28 PROCEDURE — 1125F AMNT PAIN NOTED PAIN PRSNT: CPT | Performed by: INTERNAL MEDICINE

## 2022-12-01 ENCOUNTER — OFFICE VISIT (OUTPATIENT)
Dept: SURGERY | Facility: CLINIC | Age: 72
End: 2022-12-01
Payer: MEDICARE

## 2022-12-01 DIAGNOSIS — Z09 POSTOP CHECK: ICD-10-CM

## 2022-12-01 DIAGNOSIS — N43.3 RIGHT HYDROCELE: Primary | ICD-10-CM

## 2022-12-01 PROCEDURE — 99024 POSTOP FOLLOW-UP VISIT: CPT | Performed by: UROLOGY

## 2022-12-14 ENCOUNTER — APPOINTMENT (OUTPATIENT)
Dept: GENERAL RADIOLOGY | Age: 72
End: 2022-12-14
Attending: STUDENT IN AN ORGANIZED HEALTH CARE EDUCATION/TRAINING PROGRAM
Payer: MEDICARE

## 2022-12-14 ENCOUNTER — HOSPITAL ENCOUNTER (EMERGENCY)
Age: 72
Discharge: HOME | End: 2022-12-14
Attending: STUDENT IN AN ORGANIZED HEALTH CARE EDUCATION/TRAINING PROGRAM
Payer: MEDICARE

## 2022-12-14 VITALS
TEMPERATURE: 97 F | OXYGEN SATURATION: 98 % | HEART RATE: 62 BPM | HEIGHT: 68 IN | SYSTOLIC BLOOD PRESSURE: 128 MMHG | BODY MASS INDEX: 31.07 KG/M2 | WEIGHT: 205 LBS | DIASTOLIC BLOOD PRESSURE: 66 MMHG | RESPIRATION RATE: 16 BRPM

## 2022-12-14 DIAGNOSIS — S22.31XA CLOSED FRACTURE OF ONE RIB OF RIGHT SIDE, INITIAL ENCOUNTER: Primary | ICD-10-CM

## 2022-12-14 PROCEDURE — 71101 X-RAY EXAM UNILAT RIBS/CHEST: CPT | Performed by: STUDENT IN AN ORGANIZED HEALTH CARE EDUCATION/TRAINING PROGRAM

## 2022-12-14 PROCEDURE — 99283 EMERGENCY DEPT VISIT LOW MDM: CPT

## 2022-12-14 RX ORDER — HYDROCODONE BITARTRATE AND ACETAMINOPHEN 5; 325 MG/1; MG/1
1 TABLET ORAL ONCE
Status: COMPLETED | OUTPATIENT
Start: 2022-12-14 | End: 2022-12-14

## 2022-12-14 RX ORDER — LIDOCAINE 50 MG/G
1 PATCH TOPICAL EVERY 24 HOURS
Qty: 30 EACH | Refills: 0 | Status: SHIPPED | OUTPATIENT
Start: 2022-12-14

## 2022-12-14 RX ORDER — HYDROCODONE BITARTRATE AND ACETAMINOPHEN 5; 325 MG/1; MG/1
1-2 TABLET ORAL EVERY 6 HOURS PRN
Qty: 10 TABLET | Refills: 0 | Status: SHIPPED | OUTPATIENT
Start: 2022-12-14 | End: 2022-12-16

## 2022-12-15 ENCOUNTER — PATIENT MESSAGE (OUTPATIENT)
Dept: INTERNAL MEDICINE CLINIC | Facility: CLINIC | Age: 72
End: 2022-12-15

## 2022-12-15 NOTE — TELEPHONE ENCOUNTER
From: Yohan Beauchamp  To: Archie Weathers MD  Sent: 12/15/2022 6:58 AM CST  Subject: BROKEN RIB    On Monday afternoon while walking our dog with my wife, I fell and banged up my right knee. I also injured my right side which a subsequent x-ray on Wednesday showed that I had broken a rib. I was treated at the immediate care facility in [place] and prescribed Hydrocodone, 10 tablets. I took 1 tablet at bedtime on Wednesday and 1 tablet at 6 AM this morning. They also gave me a device to monitor my breathing. My pain level has not changed which I guess is normal for my injury. My question is, if I need a refill of the prescription do I request through you or do I do it through the immediate care facility?

## 2022-12-15 NOTE — TELEPHONE ENCOUNTER
Call patient if [employer] message is unread to see if he would like to be added to Friday's schedule and read him the [employer] message if unread

## 2022-12-15 NOTE — TELEPHONE ENCOUNTER
HPI  Sonia Balbuena is a 63 year old female presents to the office for f/u.     Son passed June 1st due to drug aoverdose. Handling ok. Some insomnia. No si. No hi.     Pt reports L shoulder pain improved. Hurts when reaching to her back. Not much pain with overhead movements.     Also, reports BL Lower abd pain resolved. Alternating constipation and diarrhea improved. Was not worse when she increased her metformin in June. No n/v. No fevers or chills.       Family History   Problem Relation Age of Onset   • Heart Mother   CHF, dec at 70   • Diabetes Mother   • Heart Father   sudden death, probable heart related, age 60   • Cancer Sister   breast,  diag at 49, then 59, status post; liver cancer   • Diabetes Sister   diet controlled   • Heart Brother   MI at 50   • Heart Sister   3 angioplasties   • OTHER Sister   MS, diabetes, 1 MI   • Cancer None   Ovarian, uterine or colon     Past Medical History:   Diagnosis Date   • Hyperthyroidism   • Other and unspecified hyperlipidemia   • Shingles   • Type II or unspecified type diabetes mellitus without mention of complication, not stated as uncontrolled   • Unspecified essential hypertension     Past Surgical History:   Procedure Laterality Date   • breast biopsies, x2, benign   • BREAST LUMPECTOMY Left   • BREAST LUMPECTOMY Right   • c section, twins, 29 weeks 1986   • thyroid nodule biopsies, benign     Social History   • Marital status:    Spouse name: N/A   • Number of children: N/A   • Years of education: N/A     Social History Main Topics   • Smoking status: Never Smoker   • Smokeless tobacco: Never Used   • Alcohol use No   • Drug use: No   • Sexual activity: Not Currently   Partners: Male     Social History Narrative   retired from [employer]   now  work       Current Outpatient Medications   Medication Sig Dispense Refill   • metFORMIN (GLUCOPHAGE) 850 MG tablet Take 1 tablet by mouth 3 times daily (with meals). 270 tablet 1   •  Would you like ER f/u before refilling HYDROcodone-acetaminophen 5-325 MG Oral Tab?      ED visit 12/14/22  Dx:Closed fracture of one rib of right side glipiZIDE (GLUCOTROL) 5 MG tablet Take 1 tablet by mouth daily. 90 tablet 1   • methIMAzole (TAPAZOLE) 5 MG tablet Take 0.5 tablets by mouth every morning. 45 tablet 2   • atorvastatin (LIPITOR) 20 MG tablet Take 1 tablet by mouth daily. 90 tablet 1   • cetirizine (ZYRTEC) 10 MG tablet Take 10 mg by mouth.     • Cholecalciferol (VITAMIN D-3) 1000 units Cap      • clobetasol (TEMOVATE) 0.05 % ointment Apply to affected area 3 times weekly for 1m, then 1-2 times weekly as maintenance.     • blood glucose (GROVER CONTOUR TEST) test strip TEST TWICE DAILY     • Lancets (MICROLET) Misc TEST TWICE DAILY     • MAGNESIUM PO Take 250 mg by mouth.     • Multiple Vitamins-Minerals (PRESERVISION AREDS 2) Cap      • vitamin E 400 UNIT capsule Take 400 Units by mouth.       No current facility-administered medications for this visit.        ALLERGIES  Enalapril, Aspirin, Clarithromycin, Erythromycin, Trimethoprim, Penicillins and Sulfa drugs    PHYSICAL EXAMINATION  Visit Vitals  /70   Pulse 80   Ht 5' 6\" (1.676 m)   Wt 82.1 kg (181 lb)   SpO2 99%   BMI 29.21 kg/m²   Constitutional: No apparent distress.  HENT: Head: Normocephalic and atraumatic. Mouth/Throat: Oropharynx is clear and moist. Normal hard and soft palate.   Eyes: Conjunctiva normal, extraocular motions normal, pupils equal, round and reactive to light and accomodation. Right eye exhibits no discharge and no scleral icterus. Left eye exhibits no discharge and no scleral icterus.   Neck: Normal range of motion. Neck supple. No tracheal deviation present.   Cardiovascular: RRR. S1, S2 normal. No S3. No S4. No murmur heard. No pedal edema.   Respiratory: Effort normal. No chest tenderness. Auscultation reveals normal breath sounds bilaterally. No wheezes. No rales.   Abdomen: Soft. Non-tender. non-distended.   Musculoskeletal: No joint effusions.  Extremities: No clubbing. No cyanosis.  Lymphatic: No neck adenopathy.   Neurological: Has normal motor skills.   Skin:  Skin is warm and dry.  Psychiatric: Normal mood, affect, and judgment.      Current other health providers:  Dr. Dawn.       ASSESSMENT/PLAN    BL lower abd pain: Pelvic us neg. sxs resolved. Warning signs discussed. To call with any concern.     L RC dysfunction: sxs improved. To call with any concern.     Stress rxn/insomnia: rec melatonin prn at night. Educated re normal grieving process. To call with any SI/HI. To call with any concern.     DIABETES UNCOMPL ADULT  - a1c at goal. Cont metformin 850mg tid and glipizide 5mg daily.   - Eye Exam: due NOW.   - Flu Vaccine: UTD.   - Pneumonia Vaccine: UTD.   - Aspirin: possible asa allergy. Will avoid.   - Microalbumin: due 12/2019.   - foot exam due 7/20.     Hyperthyroidism, likely due to mild Graves' disease  on methimazole. Following with Dr. Dawn.     Thyroid nodules: biopsy NEGATIVE 8/17. repeat ultrasound due in 2021 per dr. Dawn.     Hyperlipidemia  Comment: LDL at goal.     Colonoscopy due 2/2019. Pt prefers to wait till next ov.     F/u mammogram due 10/19.     DEXA. Pt prefers to wait till next ov.     Pap and hpv neg 9/18.     Initial annual medicare wellness check due 1/20.     See orders    FU in 1/20 for wellness check or sooner prn.  Chest fasting labs prior to next appt.   Patient expresses understanding and agrees with the plan.    Electronically signed by:  Raza Galarza MD  7/1/2019

## 2022-12-15 NOTE — TELEPHONE ENCOUNTER
Pt aware of message, scheduled appt.     Future Appointments   Date Time Provider Osorio Mariia   12/16/2022  1:30 PM Asiya Mckee MD [facility] [place]

## 2022-12-16 ENCOUNTER — OFFICE VISIT (OUTPATIENT)
Dept: INTERNAL MEDICINE CLINIC | Facility: CLINIC | Age: 72
End: 2022-12-16
Payer: MEDICARE

## 2022-12-16 VITALS
HEART RATE: 78 BPM | DIASTOLIC BLOOD PRESSURE: 60 MMHG | RESPIRATION RATE: 16 BRPM | TEMPERATURE: 98 F | SYSTOLIC BLOOD PRESSURE: 120 MMHG | HEIGHT: 68 IN | OXYGEN SATURATION: 100 % | WEIGHT: 218.38 LBS | BODY MASS INDEX: 33.1 KG/M2

## 2022-12-16 DIAGNOSIS — S22.31XA CLOSED FRACTURE OF ONE RIB OF RIGHT SIDE, INITIAL ENCOUNTER: Primary | ICD-10-CM

## 2022-12-16 PROCEDURE — 99213 OFFICE O/P EST LOW 20 MIN: CPT | Performed by: INTERNAL MEDICINE

## 2022-12-16 PROCEDURE — 1111F DSCHRG MED/CURRENT MED MERGE: CPT | Performed by: INTERNAL MEDICINE

## 2022-12-16 RX ORDER — HYDROCODONE BITARTRATE AND ACETAMINOPHEN 5; 325 MG/1; MG/1
1-2 TABLET ORAL EVERY 6 HOURS PRN
Qty: 14 TABLET | Refills: 0 | Status: SHIPPED | OUTPATIENT
Start: 2022-12-16 | End: 2022-12-21

## 2022-12-21 RX ORDER — TRAZODONE HYDROCHLORIDE 50 MG/1
TABLET ORAL
Qty: 90 TABLET | Refills: 1 | Status: SHIPPED | OUTPATIENT
Start: 2022-12-21

## 2022-12-28 ENCOUNTER — PATIENT MESSAGE (OUTPATIENT)
Dept: INTERNAL MEDICINE CLINIC | Facility: CLINIC | Age: 72
End: 2022-12-28

## 2022-12-28 NOTE — TELEPHONE ENCOUNTER
From: Skye Beauchamp  To: Jennifer Suárez MD  Sent: 12/28/2022 9:37 AM CST  Subject: Follow up for broken rib    You may remember that back of December 12th I broke a rib. I saw you on the 16th as a follow up and to renew my Hydrocodone prescription. All has been going about as well as expected. I've only taken one of my pain pills in the last 5 days. I was still sore on my right side but not intolerable. Starting yesterday the pain has migrated from my right side to the middle of my chest. I don't know if this is an expected part of the healing process or if it is something else. Do you have any thoughts on this?

## 2022-12-30 NOTE — TELEPHONE ENCOUNTER
If pain is happening with exertion (going up stairs, walking etc) in the middle of the chest, then this is concerning and he should call his cardiologist.     Schedule appt for further evaluation

## 2023-01-25 ENCOUNTER — PATIENT MESSAGE (OUTPATIENT)
Dept: INTERNAL MEDICINE CLINIC | Facility: CLINIC | Age: 73
End: 2023-01-25

## 2023-01-25 RX ORDER — HYDROCODONE BITARTRATE AND ACETAMINOPHEN 5; 325 MG/1; MG/1
1-2 TABLET ORAL EVERY 8 HOURS PRN
Qty: 10 TABLET | Refills: 0 | Status: SHIPPED | OUTPATIENT
Start: 2023-01-25

## 2023-01-25 NOTE — TELEPHONE ENCOUNTER
From: Ayo Beauchamp  To: Vilma Calvert MD  Sent: 1/25/2023 8:48 AM CST  Subject: HYDROCODONE REFILL    I last saw you on December 16th as a follow up to my broken rib accident a few days prior to then. At that time you gave me a prescription for the above drug to help manage my pain. The ER gave me a prescription for 10 tablets and you gave me one for 14. I have limited my use as much as possible and while the pain and discomfort is decreasing with time, I still am bothered mainly at night. Last night I woke in pain at midnight and after tossing and turning for the better part of an hour, I took my final tablet and was able to get back to sleep for 4 hours. I'm guessing that within the next week or so I should be healed enough to return to \"normal\". Would it be possible to get a prescription refill to carry me through if need be?

## 2023-03-04 RX ORDER — ALBUTEROL SULFATE 90 UG/1
2 AEROSOL, METERED RESPIRATORY (INHALATION) EVERY 4 HOURS PRN
Qty: 6.7 G | Refills: 11 | Status: CANCELLED | OUTPATIENT
Start: 2023-03-04

## 2023-03-06 ENCOUNTER — PATIENT MESSAGE (OUTPATIENT)
Dept: INTERNAL MEDICINE CLINIC | Facility: CLINIC | Age: 73
End: 2023-03-06

## 2023-03-06 RX ORDER — ALBUTEROL SULFATE 90 UG/1
2 AEROSOL, METERED RESPIRATORY (INHALATION) EVERY 6 HOURS PRN
Qty: 1 EACH | Refills: 3 | Status: SHIPPED | OUTPATIENT
Start: 2023-03-06

## 2023-03-06 NOTE — TELEPHONE ENCOUNTER
Protocol failed     Requesting: albuterol 108 (90 base) mcg/act     LOV: 12/16/22   RTC: none noted   Filled: 11/21/21 # 6.7g 11 refills   Recent Labs: 11/22/22    Upcoming OV: 5/26/23

## 2023-03-07 NOTE — TELEPHONE ENCOUNTER
Patient sent second [facility] asking to disregard this message. See other [facility] dated 3/7 for further information. This encounter closed.

## 2023-03-07 NOTE — TELEPHONE ENCOUNTER
From: Breezy Beauchamp  To: Felipe Saenz MD  Sent: 3/6/2023 6:40 PM CST  Subject: ALBUTEROL PRESCRIPTION DENIAL    It looks like my request for a refill of my albuterol inhaler was denied. I'm really not sure why. It was initially prescribed well over a year ago and provided for 11 refills. I never refilled it at the time ran out in February of this year. This was really useful on those rare occasions when I experienced wheezing. I'm not sure what my options are at the point and I request your advise.

## 2023-03-07 NOTE — TELEPHONE ENCOUNTER
From: Breezy Beauchamp  To: Felipe Saenz MD  Sent: 3/6/2023 7:30 PM CST  Subject: DISREGARD MY PREVIOUS MESSAGE    No sooner did I send you the prior message, I received a message from [facility] that they approved Ventolin rather than the Albuterol prescription. I assume they are equivalent but if you have any concerns, please let me know.

## 2023-03-13 ENCOUNTER — APPOINTMENT (OUTPATIENT)
Dept: GENERAL RADIOLOGY | Age: 73
End: 2023-03-13
Attending: EMERGENCY MEDICINE
Payer: MEDICARE

## 2023-03-13 ENCOUNTER — HOSPITAL ENCOUNTER (EMERGENCY)
Age: 73
Discharge: HOME | End: 2023-03-13
Attending: EMERGENCY MEDICINE
Payer: MEDICARE

## 2023-03-13 ENCOUNTER — PATIENT MESSAGE (OUTPATIENT)
Dept: INTERNAL MEDICINE CLINIC | Facility: CLINIC | Age: 73
End: 2023-03-13

## 2023-03-13 VITALS
HEIGHT: 67 IN | HEART RATE: 72 BPM | DIASTOLIC BLOOD PRESSURE: 58 MMHG | RESPIRATION RATE: 18 BRPM | SYSTOLIC BLOOD PRESSURE: 107 MMHG | TEMPERATURE: 98 F | WEIGHT: 205 LBS | OXYGEN SATURATION: 96 % | BODY MASS INDEX: 32.18 KG/M2

## 2023-03-13 DIAGNOSIS — J98.01 ACUTE BRONCHOSPASM: Primary | ICD-10-CM

## 2023-03-13 DIAGNOSIS — J06.9 UPPER RESPIRATORY TRACT INFECTION, UNSPECIFIED TYPE: ICD-10-CM

## 2023-03-13 LAB
ALBUMIN SERPL-MCNC: 3.5 G/DL (ref 3.4–5)
ALBUMIN/GLOB SERPL: 1 {RATIO} (ref 1–2)
ALP LIVER SERPL-CCNC: 59 U/L
ALT SERPL-CCNC: 18 U/L
ANION GAP SERPL CALC-SCNC: 8 MMOL/L (ref 0–18)
AST SERPL-CCNC: 17 U/L (ref 15–37)
ATRIAL RATE: 77 BPM
BASOPHILS # BLD AUTO: 0.04 X10(3) UL (ref 0–0.2)
BASOPHILS NFR BLD AUTO: 0.4 %
BILIRUB SERPL-MCNC: 0.7 MG/DL (ref 0.1–2)
BUN BLD-MCNC: 19 MG/DL (ref 7–18)
CALCIUM BLD-MCNC: 8.4 MG/DL (ref 8.5–10.1)
CHLORIDE SERPL-SCNC: 105 MMOL/L (ref 98–112)
CO2 SERPL-SCNC: 25 MMOL/L (ref 21–32)
CREAT BLD-MCNC: 1.27 MG/DL
EOSINOPHIL # BLD AUTO: 0.27 X10(3) UL (ref 0–0.7)
EOSINOPHIL NFR BLD AUTO: 2.8 %
ERYTHROCYTE [DISTWIDTH] IN BLOOD BY AUTOMATED COUNT: 17.6 %
GFR SERPLBLD BASED ON 1.73 SQ M-ARVRAT: 59 ML/MIN/1.73M2 (ref 60–?)
GLOBULIN PLAS-MCNC: 3.4 G/DL (ref 2.8–4.4)
GLUCOSE BLD-MCNC: 193 MG/DL (ref 70–99)
HCT VFR BLD AUTO: 38.7 %
HGB BLD-MCNC: 12.3 G/DL
IMM GRANULOCYTES # BLD AUTO: 0.04 X10(3) UL (ref 0–1)
IMM GRANULOCYTES NFR BLD: 0.4 %
LYMPHOCYTES # BLD AUTO: 1.85 X10(3) UL (ref 1–4)
LYMPHOCYTES NFR BLD AUTO: 19 %
MCH RBC QN AUTO: 25.7 PG (ref 26–34)
MCHC RBC AUTO-ENTMCNC: 31.8 G/DL (ref 31–37)
MCV RBC AUTO: 81 FL
MONOCYTES # BLD AUTO: 0.78 X10(3) UL (ref 0.1–1)
MONOCYTES NFR BLD AUTO: 8 %
NEUTROPHILS # BLD AUTO: 6.74 X10 (3) UL (ref 1.5–7.7)
NEUTROPHILS # BLD AUTO: 6.74 X10(3) UL (ref 1.5–7.7)
NEUTROPHILS NFR BLD AUTO: 69.4 %
NT-PROBNP SERPL-MCNC: 140 PG/ML (ref ?–125)
OSMOLALITY SERPL CALC.SUM OF ELEC: 294 MOSM/KG (ref 275–295)
P AXIS: 106 DEGREES
P-R INTERVAL: 256 MS
PLATELET # BLD AUTO: 178 10(3)UL (ref 150–450)
POCT INFLUENZA A: NEGATIVE
POCT INFLUENZA B: NEGATIVE
POTASSIUM SERPL-SCNC: 3.9 MMOL/L (ref 3.5–5.1)
PROT SERPL-MCNC: 6.9 G/DL (ref 6.4–8.2)
Q-T INTERVAL: 460 MS
QRS DURATION: 180 MS
QTC CALCULATION (BEZET): 520 MS
R AXIS: -81 DEGREES
RBC # BLD AUTO: 4.78 X10(6)UL
SARS-COV-2 RNA RESP QL NAA+PROBE: NOT DETECTED
SODIUM SERPL-SCNC: 138 MMOL/L (ref 136–145)
T AXIS: 83 DEGREES
TROPONIN I HIGH SENSITIVITY: 8 NG/L
VENTRICULAR RATE: 77 BPM
WBC # BLD AUTO: 9.7 X10(3) UL (ref 4–11)

## 2023-03-13 PROCEDURE — 93010 ELECTROCARDIOGRAM REPORT: CPT

## 2023-03-13 PROCEDURE — 83880 ASSAY OF NATRIURETIC PEPTIDE: CPT | Performed by: EMERGENCY MEDICINE

## 2023-03-13 PROCEDURE — 96360 HYDRATION IV INFUSION INIT: CPT

## 2023-03-13 PROCEDURE — 99284 EMERGENCY DEPT VISIT MOD MDM: CPT

## 2023-03-13 PROCEDURE — 80053 COMPREHEN METABOLIC PANEL: CPT | Performed by: EMERGENCY MEDICINE

## 2023-03-13 PROCEDURE — 84484 ASSAY OF TROPONIN QUANT: CPT | Performed by: EMERGENCY MEDICINE

## 2023-03-13 PROCEDURE — 85025 COMPLETE CBC W/AUTO DIFF WBC: CPT | Performed by: EMERGENCY MEDICINE

## 2023-03-13 PROCEDURE — 71045 X-RAY EXAM CHEST 1 VIEW: CPT | Performed by: EMERGENCY MEDICINE

## 2023-03-13 PROCEDURE — 93005 ELECTROCARDIOGRAM TRACING: CPT

## 2023-03-13 PROCEDURE — 94640 AIRWAY INHALATION TREATMENT: CPT

## 2023-03-13 PROCEDURE — 96374 THER/PROPH/DIAG INJ IV PUSH: CPT

## 2023-03-13 PROCEDURE — 87502 INFLUENZA DNA AMP PROBE: CPT | Performed by: EMERGENCY MEDICINE

## 2023-03-13 PROCEDURE — 99285 EMERGENCY DEPT VISIT HI MDM: CPT

## 2023-03-13 RX ORDER — PREDNISONE 20 MG/1
40 TABLET ORAL DAILY
Qty: 10 TABLET | Refills: 0 | Status: SHIPPED | OUTPATIENT
Start: 2023-03-13 | End: 2023-03-18

## 2023-03-13 RX ORDER — SODIUM CHLORIDE 9 MG/ML
INJECTION, SOLUTION INTRAVENOUS ONCE
Status: COMPLETED | OUTPATIENT
Start: 2023-03-13 | End: 2023-03-13

## 2023-03-13 RX ORDER — AZITHROMYCIN 250 MG/1
TABLET, FILM COATED ORAL
Qty: 6 TABLET | Refills: 0 | Status: SHIPPED | OUTPATIENT
Start: 2023-03-13 | End: 2023-03-18

## 2023-03-13 RX ORDER — METHYLPREDNISOLONE SODIUM SUCCINATE 125 MG/2ML
125 INJECTION, POWDER, LYOPHILIZED, FOR SOLUTION INTRAMUSCULAR; INTRAVENOUS ONCE
Status: COMPLETED | OUTPATIENT
Start: 2023-03-13 | End: 2023-03-13

## 2023-03-13 RX ORDER — ALBUTEROL SULFATE 90 UG/1
2 AEROSOL, METERED RESPIRATORY (INHALATION) EVERY 4 HOURS PRN
Qty: 1 EACH | Refills: 0 | Status: SHIPPED | OUTPATIENT
Start: 2023-03-13 | End: 2023-04-12

## 2023-03-13 RX ORDER — ALBUTEROL SULFATE 90 UG/1
8 AEROSOL, METERED RESPIRATORY (INHALATION) ONCE
Status: COMPLETED | OUTPATIENT
Start: 2023-03-13 | End: 2023-03-13

## 2023-03-13 NOTE — ED INITIAL ASSESSMENT (HPI)
PT to the ED for evaluation of RAFY for the last week. Coughing this morning. Using inhaler without relief.

## 2023-03-13 NOTE — TELEPHONE ENCOUNTER
From: Eunice Beauchamp  To: Jamia Denise MD  Sent: 3/13/2023 10:38 AM CDT  Subject: Follow up of ER visit today    I was treated at the [facility] location this morning for bronchitis and was instructed to follow up with you in a couple of days. Let me know if I need to set up an office visit.

## 2023-04-03 ENCOUNTER — APPOINTMENT (OUTPATIENT)
Dept: GENERAL RADIOLOGY | Age: 73
End: 2023-04-03
Attending: EMERGENCY MEDICINE
Payer: MEDICARE

## 2023-04-03 ENCOUNTER — HOSPITAL ENCOUNTER (EMERGENCY)
Age: 73
Discharge: HOME | End: 2023-04-03
Attending: EMERGENCY MEDICINE
Payer: MEDICARE

## 2023-04-03 ENCOUNTER — PATIENT MESSAGE (OUTPATIENT)
Dept: INTERNAL MEDICINE CLINIC | Facility: CLINIC | Age: 73
End: 2023-04-03

## 2023-04-03 VITALS
WEIGHT: 205 LBS | SYSTOLIC BLOOD PRESSURE: 105 MMHG | HEIGHT: 67 IN | BODY MASS INDEX: 32.18 KG/M2 | HEART RATE: 105 BPM | RESPIRATION RATE: 16 BRPM | OXYGEN SATURATION: 100 % | DIASTOLIC BLOOD PRESSURE: 86 MMHG | TEMPERATURE: 97 F

## 2023-04-03 DIAGNOSIS — J98.01 ACUTE BRONCHOSPASM: ICD-10-CM

## 2023-04-03 DIAGNOSIS — J40 BRONCHITIS: Primary | ICD-10-CM

## 2023-04-03 DIAGNOSIS — J18.9 COMMUNITY ACQUIRED PNEUMONIA OF LEFT LUNG, UNSPECIFIED PART OF LUNG: ICD-10-CM

## 2023-04-03 LAB
ATRIAL RATE: 101 BPM
Q-T INTERVAL: 438 MS
QRS DURATION: 170 MS
QTC CALCULATION (BEZET): 567 MS
R AXIS: -77 DEGREES
SARS-COV-2 RNA RESP QL NAA+PROBE: NOT DETECTED
T AXIS: 90 DEGREES
VENTRICULAR RATE: 101 BPM

## 2023-04-03 PROCEDURE — 71045 X-RAY EXAM CHEST 1 VIEW: CPT | Performed by: EMERGENCY MEDICINE

## 2023-04-03 PROCEDURE — 93005 ELECTROCARDIOGRAM TRACING: CPT

## 2023-04-03 PROCEDURE — 94645 CONT INHLJ TX EACH ADDL HOUR: CPT

## 2023-04-03 PROCEDURE — 94799 UNLISTED PULMONARY SVC/PX: CPT

## 2023-04-03 PROCEDURE — 99285 EMERGENCY DEPT VISIT HI MDM: CPT

## 2023-04-03 PROCEDURE — 94644 CONT INHLJ TX 1ST HOUR: CPT

## 2023-04-03 PROCEDURE — 99284 EMERGENCY DEPT VISIT MOD MDM: CPT

## 2023-04-03 PROCEDURE — 93010 ELECTROCARDIOGRAM REPORT: CPT

## 2023-04-03 RX ORDER — PREDNISONE 20 MG/1
40 TABLET ORAL DAILY
Qty: 10 TABLET | Refills: 0 | Status: SHIPPED | OUTPATIENT
Start: 2023-04-03 | End: 2023-04-08

## 2023-04-03 RX ORDER — LEVOFLOXACIN 500 MG/1
500 TABLET, FILM COATED ORAL DAILY
Qty: 10 TABLET | Refills: 0 | Status: SHIPPED | OUTPATIENT
Start: 2023-04-03 | End: 2023-04-13

## 2023-04-03 RX ORDER — BENZONATATE 100 MG/1
100 CAPSULE ORAL 3 TIMES DAILY PRN
Qty: 30 CAPSULE | Refills: 0 | Status: SHIPPED | OUTPATIENT
Start: 2023-04-03 | End: 2023-05-03

## 2023-04-03 RX ORDER — PREDNISONE 20 MG/1
40 TABLET ORAL ONCE
Status: COMPLETED | OUTPATIENT
Start: 2023-04-03 | End: 2023-04-03

## 2023-04-03 RX ORDER — LEVOFLOXACIN 500 MG/1
500 TABLET, FILM COATED ORAL ONCE
Status: COMPLETED | OUTPATIENT
Start: 2023-04-03 | End: 2023-04-03

## 2023-04-03 RX ORDER — ALBUTEROL SULFATE 90 UG/1
2 AEROSOL, METERED RESPIRATORY (INHALATION) EVERY 4 HOURS PRN
Qty: 1 EACH | Refills: 0 | Status: SHIPPED | OUTPATIENT
Start: 2023-04-03 | End: 2023-05-03

## 2023-04-03 NOTE — TELEPHONE ENCOUNTER
From: Rojean Sicard DeSanti  To: Arsalan Torres MD  Sent: 4/3/2023 9:41 AM CDT  Subject: FOLLOW UP TO ER VISIT OF TODAY    I was in the [facility] this morning and was diagnosed with Bronchitis (as I was 3 weeks ago), Bronchospasm and Adult Pneumonia. I was instructed to see you in the next few days. Please let me know when I can get in there. Thanks.

## 2023-04-03 NOTE — ED INITIAL ASSESSMENT (HPI)
RAFY x 4 days. +Diminished with wheezes bilat. He says he was using his inhaler every hour last night with only temporary relief. He denies hx of asthma. +Hx of smoking. Pt sts a friend of his who was visiting from [place] recently had a cough the entire time he was here.

## 2023-04-05 ENCOUNTER — OFFICE VISIT (OUTPATIENT)
Dept: INTERNAL MEDICINE CLINIC | Facility: CLINIC | Age: 73
End: 2023-04-05
Payer: MEDICARE

## 2023-04-05 ENCOUNTER — TELEPHONE (OUTPATIENT)
Dept: INTERNAL MEDICINE CLINIC | Facility: CLINIC | Age: 73
End: 2023-04-05

## 2023-04-05 VITALS
BODY MASS INDEX: 33.37 KG/M2 | HEIGHT: 67 IN | WEIGHT: 212.63 LBS | SYSTOLIC BLOOD PRESSURE: 130 MMHG | RESPIRATION RATE: 16 BRPM | DIASTOLIC BLOOD PRESSURE: 62 MMHG | HEART RATE: 92 BPM | OXYGEN SATURATION: 97 % | TEMPERATURE: 98 F

## 2023-04-05 DIAGNOSIS — E11.9 DIET-CONTROLLED TYPE 2 DIABETES MELLITUS (HCC): ICD-10-CM

## 2023-04-05 DIAGNOSIS — J18.9 COMMUNITY ACQUIRED PNEUMONIA, UNSPECIFIED LATERALITY: Primary | ICD-10-CM

## 2023-04-05 PROCEDURE — 99213 OFFICE O/P EST LOW 20 MIN: CPT | Performed by: INTERNAL MEDICINE

## 2023-04-05 RX ORDER — BIOTIN 5 MG
1 TABLET ORAL DAILY
COMMUNITY

## 2023-04-05 NOTE — PATIENT INSTRUCTIONS
Continue levofloxacin (antibiotic). If you experience any joint/muscle/achilles tendon at the heel pain, stop the antibiotic and let me know. Continue prednisone as prescribed. Take benzonatate only if you feel like you need it for cough. Continue albuterol as needed for shortness of breath, wheezing, or coughing fit. Repeat X-ray and blood work prior to next appointment.

## 2023-04-12 ENCOUNTER — HOSPITAL ENCOUNTER (EMERGENCY)
Age: 73
Discharge: HOME | End: 2023-04-12
Attending: EMERGENCY MEDICINE
Payer: MEDICARE

## 2023-04-12 ENCOUNTER — APPOINTMENT (OUTPATIENT)
Dept: GENERAL RADIOLOGY | Age: 73
End: 2023-04-12
Attending: EMERGENCY MEDICINE
Payer: MEDICARE

## 2023-04-12 VITALS
WEIGHT: 212.5 LBS | BODY MASS INDEX: 33 KG/M2 | TEMPERATURE: 98 F | HEART RATE: 78 BPM | DIASTOLIC BLOOD PRESSURE: 64 MMHG | SYSTOLIC BLOOD PRESSURE: 113 MMHG | OXYGEN SATURATION: 93 % | RESPIRATION RATE: 22 BRPM

## 2023-04-12 DIAGNOSIS — J44.1 COPD EXACERBATION (HCC): Primary | ICD-10-CM

## 2023-04-12 LAB
ALBUMIN SERPL-MCNC: 3.4 G/DL (ref 3.4–5)
ALBUMIN/GLOB SERPL: 1.1 {RATIO} (ref 1–2)
ALP LIVER SERPL-CCNC: 60 U/L
ALT SERPL-CCNC: 23 U/L
ANION GAP SERPL CALC-SCNC: 6 MMOL/L (ref 0–18)
AST SERPL-CCNC: 15 U/L (ref 15–37)
ATRIAL RATE: 105 BPM
BASOPHILS # BLD AUTO: 0.04 X10(3) UL (ref 0–0.2)
BASOPHILS NFR BLD AUTO: 0.3 %
BILIRUB SERPL-MCNC: 0.7 MG/DL (ref 0.1–2)
BUN BLD-MCNC: 16 MG/DL (ref 7–18)
CALCIUM BLD-MCNC: 8.2 MG/DL (ref 8.5–10.1)
CHLORIDE SERPL-SCNC: 104 MMOL/L (ref 98–112)
CO2 SERPL-SCNC: 28 MMOL/L (ref 21–32)
CREAT BLD-MCNC: 1.28 MG/DL
EOSINOPHIL # BLD AUTO: 0.64 X10(3) UL (ref 0–0.7)
EOSINOPHIL NFR BLD AUTO: 5.1 %
ERYTHROCYTE [DISTWIDTH] IN BLOOD BY AUTOMATED COUNT: 18.3 %
GFR SERPLBLD BASED ON 1.73 SQ M-ARVRAT: 59 ML/MIN/1.73M2 (ref 60–?)
GLOBULIN PLAS-MCNC: 3.2 G/DL (ref 2.8–4.4)
GLUCOSE BLD-MCNC: 180 MG/DL (ref 70–99)
HCT VFR BLD AUTO: 41.1 %
HGB BLD-MCNC: 13 G/DL
IMM GRANULOCYTES # BLD AUTO: 0.15 X10(3) UL (ref 0–1)
IMM GRANULOCYTES NFR BLD: 1.2 %
LYMPHOCYTES # BLD AUTO: 2.3 X10(3) UL (ref 1–4)
LYMPHOCYTES NFR BLD AUTO: 18.3 %
MCH RBC QN AUTO: 25.5 PG (ref 26–34)
MCHC RBC AUTO-ENTMCNC: 31.6 G/DL (ref 31–37)
MCV RBC AUTO: 80.7 FL
MONOCYTES # BLD AUTO: 1.32 X10(3) UL (ref 0.1–1)
MONOCYTES NFR BLD AUTO: 10.5 %
NEUTROPHILS # BLD AUTO: 8.1 X10 (3) UL (ref 1.5–7.7)
NEUTROPHILS # BLD AUTO: 8.1 X10(3) UL (ref 1.5–7.7)
NEUTROPHILS NFR BLD AUTO: 64.6 %
NT-PROBNP SERPL-MCNC: 99 PG/ML (ref ?–125)
OSMOLALITY SERPL CALC.SUM OF ELEC: 292 MOSM/KG (ref 275–295)
PLATELET # BLD AUTO: 212 10(3)UL (ref 150–450)
POTASSIUM SERPL-SCNC: 3.8 MMOL/L (ref 3.5–5.1)
PROCALCITONIN SERPL-MCNC: <0.05 NG/ML (ref ?–0.16)
PROT SERPL-MCNC: 6.6 G/DL (ref 6.4–8.2)
Q-T INTERVAL: 430 MS
QRS DURATION: 168 MS
QTC CALCULATION (BEZET): 568 MS
R AXIS: -84 DEGREES
RBC # BLD AUTO: 5.09 X10(6)UL
SARS-COV-2 RNA RESP QL NAA+PROBE: NOT DETECTED
SODIUM SERPL-SCNC: 138 MMOL/L (ref 136–145)
T AXIS: 88 DEGREES
TROPONIN I HIGH SENSITIVITY: 8 NG/L
VENTRICULAR RATE: 105 BPM
WBC # BLD AUTO: 12.6 X10(3) UL (ref 4–11)

## 2023-04-12 PROCEDURE — 84145 PROCALCITONIN (PCT): CPT | Performed by: EMERGENCY MEDICINE

## 2023-04-12 PROCEDURE — 96374 THER/PROPH/DIAG INJ IV PUSH: CPT

## 2023-04-12 PROCEDURE — 71045 X-RAY EXAM CHEST 1 VIEW: CPT | Performed by: EMERGENCY MEDICINE

## 2023-04-12 PROCEDURE — 80053 COMPREHEN METABOLIC PANEL: CPT | Performed by: EMERGENCY MEDICINE

## 2023-04-12 PROCEDURE — 87040 BLOOD CULTURE FOR BACTERIA: CPT | Performed by: EMERGENCY MEDICINE

## 2023-04-12 PROCEDURE — 85025 COMPLETE CBC W/AUTO DIFF WBC: CPT | Performed by: EMERGENCY MEDICINE

## 2023-04-12 PROCEDURE — 36415 COLL VENOUS BLD VENIPUNCTURE: CPT

## 2023-04-12 PROCEDURE — 0241U SARS-COV-2/FLU A AND B/RSV BY PCR (GENEXPERT): CPT | Performed by: EMERGENCY MEDICINE

## 2023-04-12 PROCEDURE — 99285 EMERGENCY DEPT VISIT HI MDM: CPT

## 2023-04-12 PROCEDURE — 84484 ASSAY OF TROPONIN QUANT: CPT | Performed by: EMERGENCY MEDICINE

## 2023-04-12 PROCEDURE — 83880 ASSAY OF NATRIURETIC PEPTIDE: CPT | Performed by: EMERGENCY MEDICINE

## 2023-04-12 PROCEDURE — 94644 CONT INHLJ TX 1ST HOUR: CPT

## 2023-04-12 PROCEDURE — 93005 ELECTROCARDIOGRAM TRACING: CPT

## 2023-04-12 PROCEDURE — 93010 ELECTROCARDIOGRAM REPORT: CPT

## 2023-04-12 RX ORDER — METHYLPREDNISOLONE SODIUM SUCCINATE 125 MG/2ML
125 INJECTION, POWDER, LYOPHILIZED, FOR SOLUTION INTRAMUSCULAR; INTRAVENOUS ONCE
Status: COMPLETED | OUTPATIENT
Start: 2023-04-12 | End: 2023-04-12

## 2023-04-12 RX ORDER — AMOXICILLIN AND CLAVULANATE POTASSIUM 875; 125 MG/1; MG/1
1 TABLET, FILM COATED ORAL 2 TIMES DAILY
Qty: 14 TABLET | Refills: 0 | Status: SHIPPED | OUTPATIENT
Start: 2023-04-12 | End: 2023-04-19

## 2023-04-12 RX ORDER — PREDNISONE 20 MG/1
60 TABLET ORAL DAILY
Qty: 15 TABLET | Refills: 0 | Status: SHIPPED | OUTPATIENT
Start: 2023-04-12 | End: 2023-04-17

## 2023-04-12 RX ORDER — ALBUTEROL SULFATE 90 UG/1
2 AEROSOL, METERED RESPIRATORY (INHALATION) EVERY 4 HOURS PRN
Qty: 1 EACH | Refills: 0 | Status: SHIPPED | OUTPATIENT
Start: 2023-04-12 | End: 2023-05-12

## 2023-04-12 NOTE — DISCHARGE INSTRUCTIONS
Augmentin twice per day for 7 days. Continue albuterol inhalers as needed. Prednisone once daily for 5 days starting today. Have a low threshold to return to the ER for further treatment - any worsening shortness of breath or any other concerns.

## 2023-04-12 NOTE — ED INITIAL ASSESSMENT (HPI)
Pt in er for c/o worsening santino post pneumonia dx last Monday, pt reports that he stopped taking his antibiotics after 3 days because he developed pain to his calves

## 2023-04-14 ENCOUNTER — PATIENT MESSAGE (OUTPATIENT)
Dept: INTERNAL MEDICINE CLINIC | Facility: CLINIC | Age: 73
End: 2023-04-14

## 2023-04-14 RX ORDER — ROSUVASTATIN CALCIUM 10 MG/1
10 TABLET, COATED ORAL NIGHTLY
Qty: 90 TABLET | Refills: 3 | Status: SHIPPED | OUTPATIENT
Start: 2023-04-14

## 2023-04-14 NOTE — TELEPHONE ENCOUNTER
From: Anjana Beauchamp  To: Will Tabor MD  Sent: 4/14/2023 7:37 AM CDT  Subject: ROSUVASTATIN    My prescription for the above medication will run out on Thursday. The current prescription for a 90 day supply was made on 1/11/23 and No Refills are left. I notice that the prescribing doctor was Adrianna Singh who I stopped seeing in January, 2022. I'd like to get Dr. Martha Conrad off the prescription but I'm not sure if Dr. Diego Lipscomb can prescribe it or if I need to contact my current cardiologist.  Please advise.

## 2023-04-14 NOTE — TELEPHONE ENCOUNTER
Would you be able to take over prescribing this for the patient or do you prefer he goes through his current cardiologist?  Please advise. Thank you!

## 2023-05-07 ENCOUNTER — PATIENT MESSAGE (OUTPATIENT)
Dept: INTERNAL MEDICINE CLINIC | Facility: CLINIC | Age: 73
End: 2023-05-07

## 2023-05-07 ENCOUNTER — APPOINTMENT (OUTPATIENT)
Dept: GENERAL RADIOLOGY | Age: 73
End: 2023-05-07
Attending: EMERGENCY MEDICINE
Payer: MEDICARE

## 2023-05-07 ENCOUNTER — HOSPITAL ENCOUNTER (EMERGENCY)
Age: 73
Discharge: HOME | End: 2023-05-07
Attending: EMERGENCY MEDICINE
Payer: MEDICARE

## 2023-05-07 VITALS
HEART RATE: 91 BPM | SYSTOLIC BLOOD PRESSURE: 127 MMHG | WEIGHT: 205 LBS | OXYGEN SATURATION: 98 % | DIASTOLIC BLOOD PRESSURE: 80 MMHG | TEMPERATURE: 98 F | BODY MASS INDEX: 32 KG/M2 | RESPIRATION RATE: 16 BRPM

## 2023-05-07 DIAGNOSIS — J44.1 COPD EXACERBATION (HCC): Primary | ICD-10-CM

## 2023-05-07 LAB
ALBUMIN SERPL-MCNC: 3.6 G/DL (ref 3.4–5)
ALBUMIN/GLOB SERPL: 1.1 {RATIO} (ref 1–2)
ALP LIVER SERPL-CCNC: 75 U/L
ALT SERPL-CCNC: 24 U/L
ANION GAP SERPL CALC-SCNC: 5 MMOL/L (ref 0–18)
AST SERPL-CCNC: 22 U/L (ref 15–37)
BASE EXCESS BLD CALC-SCNC: 0 MMOL/L
BASOPHILS # BLD AUTO: 0.06 X10(3) UL (ref 0–0.2)
BASOPHILS NFR BLD AUTO: 0.7 %
BILIRUB SERPL-MCNC: 0.4 MG/DL (ref 0.1–2)
BUN BLD-MCNC: 14 MG/DL (ref 7–18)
CALCIUM BLD-MCNC: 8.3 MG/DL (ref 8.5–10.1)
CHLORIDE SERPL-SCNC: 109 MMOL/L (ref 98–112)
CO2 BLD-SCNC: 27 MMOL/L (ref 22–32)
CO2 SERPL-SCNC: 26 MMOL/L (ref 21–32)
CREAT BLD-MCNC: 1.18 MG/DL
D DIMER PPP FEU-MCNC: 0.74 UG/ML FEU (ref ?–0.75)
EOSINOPHIL # BLD AUTO: 0.71 X10(3) UL (ref 0–0.7)
EOSINOPHIL NFR BLD AUTO: 8 %
ERYTHROCYTE [DISTWIDTH] IN BLOOD BY AUTOMATED COUNT: 18.6 %
GFR SERPLBLD BASED ON 1.73 SQ M-ARVRAT: 64 ML/MIN/1.73M2 (ref 60–?)
GLOBULIN PLAS-MCNC: 3.3 G/DL (ref 2.8–4.4)
GLUCOSE BLD-MCNC: 133 MG/DL (ref 70–99)
HCO3 BLD-SCNC: 25.1 MEQ/L
HCT VFR BLD AUTO: 40.7 %
HGB BLD-MCNC: 12.7 G/DL
IMM GRANULOCYTES # BLD AUTO: 0.08 X10(3) UL (ref 0–1)
IMM GRANULOCYTES NFR BLD: 0.9 %
LYMPHOCYTES # BLD AUTO: 2.61 X10(3) UL (ref 1–4)
LYMPHOCYTES NFR BLD AUTO: 29.6 %
MCH RBC QN AUTO: 26 PG (ref 26–34)
MCHC RBC AUTO-ENTMCNC: 31.2 G/DL (ref 31–37)
MCV RBC AUTO: 83.4 FL
MONOCYTES # BLD AUTO: 0.82 X10(3) UL (ref 0.1–1)
MONOCYTES NFR BLD AUTO: 9.3 %
NEUTROPHILS # BLD AUTO: 4.55 X10 (3) UL (ref 1.5–7.7)
NEUTROPHILS # BLD AUTO: 4.55 X10(3) UL (ref 1.5–7.7)
NEUTROPHILS NFR BLD AUTO: 51.5 %
NT-PROBNP SERPL-MCNC: 245 PG/ML (ref ?–450)
OSMOLALITY SERPL CALC.SUM OF ELEC: 292 MOSM/KG (ref 275–295)
PCO2 BLD: 45.5 MMHG
PH BLD: 7.35 [PH]
PLATELET # BLD AUTO: 248 10(3)UL (ref 150–450)
PO2 BLD: 49 MMHG
POTASSIUM SERPL-SCNC: 4.4 MMOL/L (ref 3.5–5.1)
PROT SERPL-MCNC: 6.9 G/DL (ref 6.4–8.2)
RBC # BLD AUTO: 4.88 X10(6)UL
SAO2 % BLD: 82 %
SARS-COV-2 RNA RESP QL NAA+PROBE: NOT DETECTED
SODIUM SERPL-SCNC: 140 MMOL/L (ref 136–145)
TROPONIN I HIGH SENSITIVITY: 9 NG/L
WBC # BLD AUTO: 8.8 X10(3) UL (ref 4–11)

## 2023-05-07 PROCEDURE — 85025 COMPLETE CBC W/AUTO DIFF WBC: CPT | Performed by: EMERGENCY MEDICINE

## 2023-05-07 PROCEDURE — 71045 X-RAY EXAM CHEST 1 VIEW: CPT | Performed by: EMERGENCY MEDICINE

## 2023-05-07 PROCEDURE — 93005 ELECTROCARDIOGRAM TRACING: CPT

## 2023-05-07 PROCEDURE — 93010 ELECTROCARDIOGRAM REPORT: CPT

## 2023-05-07 PROCEDURE — 96374 THER/PROPH/DIAG INJ IV PUSH: CPT

## 2023-05-07 PROCEDURE — 84484 ASSAY OF TROPONIN QUANT: CPT | Performed by: EMERGENCY MEDICINE

## 2023-05-07 PROCEDURE — 99285 EMERGENCY DEPT VISIT HI MDM: CPT

## 2023-05-07 PROCEDURE — 85379 FIBRIN DEGRADATION QUANT: CPT | Performed by: EMERGENCY MEDICINE

## 2023-05-07 PROCEDURE — 80053 COMPREHEN METABOLIC PANEL: CPT | Performed by: EMERGENCY MEDICINE

## 2023-05-07 PROCEDURE — 82803 BLOOD GASES ANY COMBINATION: CPT

## 2023-05-07 PROCEDURE — 83880 ASSAY OF NATRIURETIC PEPTIDE: CPT | Performed by: EMERGENCY MEDICINE

## 2023-05-07 RX ORDER — METHYLPREDNISOLONE SODIUM SUCCINATE 125 MG/2ML
125 INJECTION, POWDER, LYOPHILIZED, FOR SOLUTION INTRAMUSCULAR; INTRAVENOUS ONCE
Status: COMPLETED | OUTPATIENT
Start: 2023-05-07 | End: 2023-05-07

## 2023-05-07 RX ORDER — ALBUTEROL SULFATE 2.5 MG/3ML
5 SOLUTION RESPIRATORY (INHALATION) ONCE
Status: COMPLETED | OUTPATIENT
Start: 2023-05-07 | End: 2023-05-07

## 2023-05-07 RX ORDER — PREDNISONE 20 MG/1
40 TABLET ORAL DAILY
Qty: 8 TABLET | Refills: 0 | Status: SHIPPED | OUTPATIENT
Start: 2023-05-07 | End: 2023-05-11

## 2023-05-07 NOTE — DISCHARGE INSTRUCTIONS
Start the prednisone tomorrow use the albuterol inhaler 2 puffs every 4 hours as needed return for worsening symptoms follow-up with primary care physician and/or pulmonary over the next few days.

## 2023-05-08 ENCOUNTER — PATIENT MESSAGE (OUTPATIENT)
Dept: INTERNAL MEDICINE CLINIC | Facility: CLINIC | Age: 73
End: 2023-05-08

## 2023-05-08 LAB
ATRIAL RATE: 64 BPM
P AXIS: -2 DEGREES
Q-T INTERVAL: 438 MS
QRS DURATION: 182 MS
QTC CALCULATION (BEZET): 565 MS
R AXIS: -85 DEGREES
T AXIS: 86 DEGREES
VENTRICULAR RATE: 100 BPM

## 2023-05-08 NOTE — TELEPHONE ENCOUNTER
From: Estefani Beauchamp  To: Miles Randall MD  Sent: 5/7/2023 5:18 AM CDT  Subject: CONTINUING BREATHING ISSUES    I was at the [address] [place] [address] 3 times from mid-March to mid-April and I saw you one during that time. I was doing ok for a week or so but in the past several days I seem to have gotten worse. My O2 level which had returned to 97 is back at 93 for the past 3 days. I am also clearing my throat continuously and have an increased level of mucus. For the past several days I have added Robutussin, Mucinex and Nyquil to my nighttime routine with limited success but I still can't get past 4 hours of sleep. You wanted me to do a follow up x-ray prior to my scheduled visit later this month. I will do this tomorrow (05/08) morning at the [place] location. I am asking for your guidance at this point.

## 2023-05-08 NOTE — TELEPHONE ENCOUNTER
LS - pt reports that his SPO2 is still above 93%. Pt requesting guidance from you. Chest x-ray was completed yesterday. Please advise pt, ty!

## 2023-05-09 NOTE — TELEPHONE ENCOUNTER
LS - I am aware you had PSR schedule this pt for Friday 5/12/23. I sent [facility] back to the pt, I just wanted you to be aware, in case you have other recommendations.  Ty!

## 2023-05-09 NOTE — TELEPHONE ENCOUNTER
From: Miguel Angel Beauchamp  To: Ashtyn Méndez MD  Sent: 5/8/2023 8:48 AM CDT  Subject: Follow Up to ER Visit of 05/07/23    Well, for the fourth time since mid-March, I ended up in the [facility] ER with similar breathing issues. I received the chest x-ray which was per your March order and some blood work which may or may not be the same as per your previous order. The After Visit Summary asked me to follow up with you in 2 days. It's up to you if I should come in or wait to our scheduled visit at month-end. They also gave me a referral for a pulmonologist, Dr. Ray Colbert MD. Is it ok to schedule with him or do you have a different preference?

## 2023-05-12 ENCOUNTER — OFFICE VISIT (OUTPATIENT)
Dept: INTERNAL MEDICINE CLINIC | Facility: CLINIC | Age: 73
End: 2023-05-12
Payer: MEDICARE

## 2023-05-12 VITALS
BODY MASS INDEX: 34 KG/M2 | DIASTOLIC BLOOD PRESSURE: 60 MMHG | OXYGEN SATURATION: 97 % | SYSTOLIC BLOOD PRESSURE: 122 MMHG | RESPIRATION RATE: 16 BRPM | HEIGHT: 67 IN | TEMPERATURE: 98 F | HEART RATE: 86 BPM | WEIGHT: 216.63 LBS

## 2023-05-12 DIAGNOSIS — R06.09 DYSPNEA ON EXERTION: Primary | ICD-10-CM

## 2023-05-12 DIAGNOSIS — Z01.812 ENCOUNTER FOR PREPROCEDURE SCREENING LABORATORY TESTING FOR COVID-19: ICD-10-CM

## 2023-05-12 DIAGNOSIS — E11.9 DIET-CONTROLLED TYPE 2 DIABETES MELLITUS (HCC): ICD-10-CM

## 2023-05-12 DIAGNOSIS — Z20.822 ENCOUNTER FOR PREPROCEDURE SCREENING LABORATORY TESTING FOR COVID-19: ICD-10-CM

## 2023-05-12 PROCEDURE — 99214 OFFICE O/P EST MOD 30 MIN: CPT | Performed by: INTERNAL MEDICINE

## 2023-05-12 PROCEDURE — 1111F DSCHRG MED/CURRENT MED MERGE: CPT | Performed by: INTERNAL MEDICINE

## 2023-05-12 NOTE — PATIENT INSTRUCTIONS
Call to schedule PFT (lung function testing)    Follow up with pulmonary Dr. Tod Valderrama as scheduled    Continue albuterol as needed    Blood work in a few weeks     Send me blood sugar readings mid-late next week

## 2023-05-15 ENCOUNTER — RT VISIT (OUTPATIENT)
Dept: RESPIRATORY THERAPY | Facility: HOSPITAL | Age: 73
End: 2023-05-15
Attending: INTERNAL MEDICINE
Payer: MEDICARE

## 2023-05-15 DIAGNOSIS — R06.09 DYSPNEA ON EXERTION: ICD-10-CM

## 2023-05-15 PROCEDURE — 94729 DIFFUSING CAPACITY: CPT | Performed by: INTERNAL MEDICINE

## 2023-05-15 PROCEDURE — 94726 PLETHYSMOGRAPHY LUNG VOLUMES: CPT | Performed by: INTERNAL MEDICINE

## 2023-05-15 PROCEDURE — 94010 BREATHING CAPACITY TEST: CPT | Performed by: INTERNAL MEDICINE

## 2023-05-16 NOTE — PROCEDURES
Findings:  FEV1 is 1.63L, 59% predicted. FVC is 2.18L, 60% predicted. FEV1/ FVC ratio is 0.75. The flow-volume loop demonstrates a normal pattern. The TLC is 6.16L, 95% predicted. The residual volume 3.98L, 150% predicted. The diffusion capacity is 64% predicted and 80% predicted when corrected for alveolar volume. Impression:  There is no airway obstruction on spirometry and visualized on flow-volume loop. Despite the absence of airway obstruction, there is evidence of air trapping (residual volume of 3.98L, 150% predicted). Diffusion capacity is mildly reduced with DLCO of 64% but improves to normal when considering alveolar volume. This may represent a normal finding but can be seen in emphysema, interstitial lung disease, pulmonary vascular disease (such as pulmonary hypertension) and anemia. If not already performed, would suggest further evaluation as determined clinically. There are no previous pulmonary function tests available for comparison.

## 2023-05-17 ENCOUNTER — PATIENT MESSAGE (OUTPATIENT)
Dept: INTERNAL MEDICINE CLINIC | Facility: CLINIC | Age: 73
End: 2023-05-17

## 2023-05-17 DIAGNOSIS — E11.9 TYPE 2 DIABETES MELLITUS WITHOUT COMPLICATION, WITHOUT LONG-TERM CURRENT USE OF INSULIN (HCC): ICD-10-CM

## 2023-05-17 NOTE — TELEPHONE ENCOUNTER
From: Camilla Beauchamp  To: Stanislav Tubbs MD  Sent: 5/17/2023 4:31 PM CDT  Subject: PRESCRIPTION RENEWALS    I need to refill the following prescriptions which were last filled on 11/07/20:  MICROLET COLORED LANCETS 100  CONTOUR NEXT TEST STRIPS 100S

## 2023-05-18 RX ORDER — PERPHENAZINE 16 MG/1
TABLET, FILM COATED ORAL
Qty: 100 STRIP | Refills: 3 | Status: SHIPPED | OUTPATIENT
Start: 2023-05-18

## 2023-05-18 RX ORDER — LANCETS
EACH MISCELLANEOUS
Qty: 100 EACH | Refills: 3 | Status: SHIPPED | OUTPATIENT
Start: 2023-05-18

## 2023-05-23 ENCOUNTER — OFFICE VISIT (OUTPATIENT)
Facility: CLINIC | Age: 73
End: 2023-05-23
Payer: MEDICARE

## 2023-05-23 VITALS
BODY MASS INDEX: 34 KG/M2 | DIASTOLIC BLOOD PRESSURE: 54 MMHG | HEART RATE: 98 BPM | RESPIRATION RATE: 16 BRPM | HEIGHT: 67 IN | OXYGEN SATURATION: 98 % | SYSTOLIC BLOOD PRESSURE: 136 MMHG

## 2023-05-23 DIAGNOSIS — R05.3 CHRONIC COUGH: ICD-10-CM

## 2023-05-23 DIAGNOSIS — Z72.0 TOBACCO ABUSE: ICD-10-CM

## 2023-05-23 DIAGNOSIS — J31.0 CHRONIC RHINOSINUSITIS: ICD-10-CM

## 2023-05-23 DIAGNOSIS — J20.9 ACUTE BRONCHITIS, UNSPECIFIED ORGANISM: Primary | ICD-10-CM

## 2023-05-23 DIAGNOSIS — J32.9 CHRONIC RHINOSINUSITIS: ICD-10-CM

## 2023-05-23 PROCEDURE — 99204 OFFICE O/P NEW MOD 45 MIN: CPT | Performed by: INTERNAL MEDICINE

## 2023-05-23 RX ORDER — PREDNISONE 10 MG/1
40 TABLET ORAL DAILY
Qty: 20 TABLET | Refills: 0 | Status: SHIPPED | OUTPATIENT
Start: 2023-05-23 | End: 2023-05-28

## 2023-05-23 RX ORDER — FLUTICASONE FUROATE, UMECLIDINIUM BROMIDE AND VILANTEROL TRIFENATATE 200; 62.5; 25 UG/1; UG/1; UG/1
1 POWDER RESPIRATORY (INHALATION) DAILY
Qty: 1 EACH | Refills: 1 | Status: SHIPPED | OUTPATIENT
Start: 2023-05-23

## 2023-05-23 NOTE — PATIENT INSTRUCTIONS
Start using nasal rinses (neilmed rinse, netipot or similar) with distilled water at least once a day but can use twice a day if needed. After using the nasal rinse, then use a nasal steroid such as flonase, nasocort, nasonex or similar medication. You can get generic nasal steroids. Use this for at least two weeks to assess for any improvement. Start trelegy inhaler - one puff once a day. Rinse your mouth out after using inhaler. Be sure to price inhalers with goal for medications to be less than $50 per month.   Start prednisone regimen - 5 day course  Follow up in one month

## 2023-05-24 ENCOUNTER — TELEPHONE (OUTPATIENT)
Dept: INTERNAL MEDICINE CLINIC | Facility: CLINIC | Age: 73
End: 2023-05-24

## 2023-05-24 NOTE — TELEPHONE ENCOUNTER
Completed and signed diabetic detailed written order faxed to [facility] with receipt of confirmation   Sent to scan and copy placed in accordion

## 2023-06-19 ENCOUNTER — PATIENT MESSAGE (OUTPATIENT)
Dept: INTERNAL MEDICINE CLINIC | Facility: CLINIC | Age: 73
End: 2023-06-19

## 2023-06-19 DIAGNOSIS — R09.89 CHRONIC THROAT CLEARING: ICD-10-CM

## 2023-06-19 DIAGNOSIS — H81.09 MENIERE'S DISEASE, UNSPECIFIED LATERALITY: Primary | ICD-10-CM

## 2023-06-19 DIAGNOSIS — M25.559 HIP PAIN, UNSPECIFIED LATERALITY: ICD-10-CM

## 2023-06-20 NOTE — TELEPHONE ENCOUNTER
From: Juan Beauchamp  To: Olya Michael MD  Sent: 6/19/2023 7:52 AM CDT  Subject: ENT and Orthopedic Referrals    I have been thinking about changing my ENT Dr. for a while now. While I do like Dr. Katherin Ellis, I am not satisfied with my results. The final straw that broke the camel's back was when I saw the bill for my May 17th visit. Although he only looked at my ears and nose and spent no more than 15 minutes with me, he submitted a bill for $876, $311 for the visit which I thought appropriate but the $565 charge for surgery was way out of line. While my insurance covered the expense, I have lost confidence in him. Also, since he i does not participate in [address], I find dealing with him more difficult that my other doctors. Can you recommend an ENT in the [facility] group that can help me address my 2 major concerns, my tinitis and frequently having to clear my throat. I also my need a orthopedic referral to address issues with my left hip. While it's not a major problem on short walks, I used to walk 10,000 steps a day but it's increasingly difficult to walk around the block.

## 2023-06-22 ENCOUNTER — TELEPHONE (OUTPATIENT)
Dept: ORTHOPEDICS CLINIC | Facility: CLINIC | Age: 73
End: 2023-06-22

## 2023-06-22 DIAGNOSIS — M25.552 LEFT HIP PAIN: Primary | ICD-10-CM

## 2023-06-22 NOTE — TELEPHONE ENCOUNTER
Future Appointments   Date Time Provider Osorio Mariia   6/27/2023  9:00 AM DOTTIE Santiago  [facility] [place]   8/2/2023  2:20 PM Ashwin Medina MD [facility] [place]       Spoke to patient he said he has another appt around the 27th so he'll do the xray by then.

## 2023-06-22 NOTE — TELEPHONE ENCOUNTER
Future Appointments   Date Time Provider Osorio Chiang   6/27/2023  9:00 AM DOTTIE Justin  [facility] [place]   8/2/2023  2:20 PM Shira Yoder MD [facility] [place]       This patient is coming for LT Hip Pain. There was recent imaging done in epic from last year. Please advise if additional views are needed for this appt. Thanks.       Patient can be reached at [phone]

## 2023-06-23 ENCOUNTER — PATIENT MESSAGE (OUTPATIENT)
Dept: ORTHOPEDICS CLINIC | Facility: CLINIC | Age: 73
End: 2023-06-23

## 2023-06-23 NOTE — TELEPHONE ENCOUNTER
From: Zachariah Beauchamp  To: Dashawn Johnson MD  Sent: 6/23/2023 10:57 AM CDT  Subject: APPOINTMENT    Yesterday, I scheduled my initial appointment with you for 2:20 PM on August 2nd. I should have included that I be put on the list in case there are any cancellations in the interim.

## 2023-06-27 ENCOUNTER — OFFICE VISIT (OUTPATIENT)
Facility: CLINIC | Age: 73
End: 2023-06-27
Payer: MEDICARE

## 2023-06-27 ENCOUNTER — PATIENT MESSAGE (OUTPATIENT)
Dept: INTERNAL MEDICINE CLINIC | Facility: CLINIC | Age: 73
End: 2023-06-27

## 2023-06-27 VITALS
HEART RATE: 66 BPM | WEIGHT: 215 LBS | RESPIRATION RATE: 16 BRPM | BODY MASS INDEX: 33.74 KG/M2 | HEIGHT: 67 IN | SYSTOLIC BLOOD PRESSURE: 110 MMHG | DIASTOLIC BLOOD PRESSURE: 60 MMHG | OXYGEN SATURATION: 97 %

## 2023-06-27 DIAGNOSIS — J31.0 CHRONIC RHINOSINUSITIS: ICD-10-CM

## 2023-06-27 DIAGNOSIS — J32.9 CHRONIC RHINOSINUSITIS: ICD-10-CM

## 2023-06-27 DIAGNOSIS — J20.9 ACUTE BRONCHITIS, UNSPECIFIED ORGANISM: Primary | ICD-10-CM

## 2023-06-27 PROCEDURE — 99214 OFFICE O/P EST MOD 30 MIN: CPT | Performed by: NURSE PRACTITIONER

## 2023-06-27 RX ORDER — FLUTICASONE FUROATE, UMECLIDINIUM BROMIDE AND VILANTEROL TRIFENATATE 100; 62.5; 25 UG/1; UG/1; UG/1
1 POWDER RESPIRATORY (INHALATION) DAILY
Qty: 1 EACH | Refills: 5 | Status: SHIPPED | OUTPATIENT
Start: 2023-06-27

## 2023-07-03 ENCOUNTER — HOSPITAL ENCOUNTER (OUTPATIENT)
Dept: GENERAL RADIOLOGY | Age: 73
Discharge: HOME | End: 2023-07-03
Attending: ORTHOPAEDIC SURGERY
Payer: MEDICARE

## 2023-07-03 DIAGNOSIS — M25.552 LEFT HIP PAIN: ICD-10-CM

## 2023-07-03 PROCEDURE — 73502 X-RAY EXAM HIP UNI 2-3 VIEWS: CPT | Performed by: ORTHOPAEDIC SURGERY

## 2023-07-05 ENCOUNTER — PATIENT MESSAGE (OUTPATIENT)
Dept: ORTHOPEDICS CLINIC | Facility: CLINIC | Age: 73
End: 2023-07-05

## 2023-07-05 NOTE — TELEPHONE ENCOUNTER
From: Genaro Beauchamp  To: Ren Bella MD  Sent: 7/5/2023 8:48 AM CDT  Subject: MY APPOINTMENT OF 8/2/23    I have an appointment with you on August 2nd to discuss my left hip issues. This hip has been an issue with me for several years now off and on. I had it x-rayed by [facility] radiologists in July of 2017 but results were inconclusive. It is becoming an increasingly difficult problem lately. I have difficulty putting on my socks and laced shoes are out of the question. I need to to lift my left leg up when getting into or out of my car. For several years I walked 10,000 steps/day but now I have difficulty making it half-way around the block. I had the hip x-rayed on Monday (the 3rd) per your instructions. Although I'm not scheduled for another few weeks, if a time slot should come open in the interim, I would very much appreciate it if I could come in earlier.

## 2023-07-10 ENCOUNTER — HOSPITAL ENCOUNTER (EMERGENCY)
Age: 73
Discharge: HOME | End: 2023-07-10
Attending: EMERGENCY MEDICINE
Payer: MEDICARE

## 2023-07-10 ENCOUNTER — PATIENT MESSAGE (OUTPATIENT)
Dept: INTERNAL MEDICINE CLINIC | Facility: CLINIC | Age: 73
End: 2023-07-10

## 2023-07-10 ENCOUNTER — APPOINTMENT (OUTPATIENT)
Dept: GENERAL RADIOLOGY | Age: 73
End: 2023-07-10
Attending: EMERGENCY MEDICINE
Payer: MEDICARE

## 2023-07-10 VITALS
TEMPERATURE: 98 F | BODY MASS INDEX: 32.96 KG/M2 | HEIGHT: 67 IN | RESPIRATION RATE: 18 BRPM | SYSTOLIC BLOOD PRESSURE: 136 MMHG | WEIGHT: 210 LBS | HEART RATE: 79 BPM | DIASTOLIC BLOOD PRESSURE: 74 MMHG | OXYGEN SATURATION: 99 %

## 2023-07-10 DIAGNOSIS — S20.211A CONTUSION OF RIGHT CHEST WALL, INITIAL ENCOUNTER: Primary | ICD-10-CM

## 2023-07-10 PROCEDURE — 71101 X-RAY EXAM UNILAT RIBS/CHEST: CPT | Performed by: EMERGENCY MEDICINE

## 2023-07-10 PROCEDURE — 99284 EMERGENCY DEPT VISIT MOD MDM: CPT

## 2023-07-10 PROCEDURE — 99283 EMERGENCY DEPT VISIT LOW MDM: CPT

## 2023-07-10 RX ORDER — TRAZODONE HYDROCHLORIDE 50 MG/1
TABLET ORAL
Qty: 90 TABLET | Refills: 1 | OUTPATIENT
Start: 2023-07-10

## 2023-07-10 RX ORDER — HYDROCODONE BITARTRATE AND ACETAMINOPHEN 5; 325 MG/1; MG/1
1-2 TABLET ORAL EVERY 6 HOURS PRN
Qty: 10 TABLET | Refills: 0 | Status: SHIPPED | OUTPATIENT
Start: 2023-07-10 | End: 2023-07-15

## 2023-07-10 NOTE — TELEPHONE ENCOUNTER
From: Emiliano Beauchamp  To: Laura Genao MD  Sent: 7/10/2023 2:55 PM CDT  Subject: FOLLOW UP TO EARLIER MESSAGE    I had another appointment today after my earlier message. On the way home, I realized that despite taking the meds and applying the patch, my condition had not improved. I went to [facility] and, as you can see in my After Visit Summary, I had a right chest contusion. Thankfully, it was not a break. In addition to instructing me to use my spirometer and prescribing pain medication, they wanted me to follow up with you in a couple of days. My feeling is that I don't need to come in but rather I will monitor my recovery and let you know if there is any change for the worse. What is your recommendation? no

## 2023-07-10 NOTE — TELEPHONE ENCOUNTER
LS - pt went to  ED after writing this - please advise how pt should proceed, ty! Pt was prescribed only 10 tablets in ED:     HYDROcodone-acetaminophen 5-325 MG Oral Tab Take 1-2 tablets by mouth every 6 (six) hours as needed for Pain.  10 tablet 7/10/2023

## 2023-07-10 NOTE — TELEPHONE ENCOUNTER
No Protocol     Requesting: trazodone 50mg     LOV: 5/12/23       Called patient to clarify if he is still taking   Patient states he does not need a refill at this moment

## 2023-07-10 NOTE — TELEPHONE ENCOUNTER
From: Ayo Beauchamp  To: Vilma Calvert MD  Sent: 7/10/2023 9:48 AM CDT  Subject: Another Rib Injury    For the first time in my 73+ years, I fell out of bed last night. I fell on my right side and the pain I'm feeling right now is not unlike the pain I felt last December when I broke a rib on my right side. Back then I waited 2 days to go to the [facility] for my diagnosis. I had 3 Hydrocodone pills from my December incident and I took one this morning. I also applied a lydocaine patch. What action do you suggest going forward.

## 2023-07-10 NOTE — ED INITIAL ASSESSMENT (HPI)
Pt to ed after falling out of bed last night, pain to right rib cage. Rib fx in December to same area.  Denies SOB or RAFY, Very little relief with home lidocaine patch he applied PTA

## 2023-07-12 ENCOUNTER — PATIENT OUTREACH (OUTPATIENT)
Dept: CASE MANAGEMENT | Age: 73
End: 2023-07-12

## 2023-07-12 NOTE — PROGRESS NOTES
ED follow up, first attempt. (Dc 7/10)    Olya Michael MD  Specialty: Internal Medicine  Contact: [address] [place] [address] [facility]  [address] [facility] [phone]    Patient declined appointment, will schedule with PCP if conditions worsen. Confirmed with patient. Closing encounter.

## 2023-07-21 ENCOUNTER — PATIENT MESSAGE (OUTPATIENT)
Dept: INTERNAL MEDICINE CLINIC | Facility: CLINIC | Age: 73
End: 2023-07-21

## 2023-07-21 DIAGNOSIS — M25.552 LEFT HIP PAIN: Primary | ICD-10-CM

## 2023-07-24 NOTE — TELEPHONE ENCOUNTER
Please reach out to the patient for scheduling sooner follow up appointment as he requests    (He's not due for wellness exam until Nov)    Also please let him know I placed order for physical therapy

## 2023-07-24 NOTE — TELEPHONE ENCOUNTER
Called and spoke with pt    Informed pt that an order for PT with [facility] has been placed    Pt v/u    Pt is also scheduled for a f/u    Future Appointments   Date Time Provider Osorio Chiang   8/11/2023  1:30 PM Dara Dai MD [facility] [place]

## 2023-08-03 ENCOUNTER — OFFICE VISIT (OUTPATIENT)
Dept: PHYSICAL THERAPY | Age: 73
End: 2023-08-03
Attending: INTERNAL MEDICINE
Payer: MEDICARE

## 2023-08-03 DIAGNOSIS — M25.552 LEFT HIP PAIN: Primary | ICD-10-CM

## 2023-08-03 PROCEDURE — 97110 THERAPEUTIC EXERCISES: CPT

## 2023-08-03 PROCEDURE — 97162 PT EVAL MOD COMPLEX 30 MIN: CPT

## 2023-08-07 ENCOUNTER — LAB ENCOUNTER (OUTPATIENT)
Dept: LAB | Age: 73
End: 2023-08-07
Attending: INTERNAL MEDICINE
Payer: MEDICARE

## 2023-08-07 ENCOUNTER — HOSPITAL ENCOUNTER (OUTPATIENT)
Dept: GENERAL RADIOLOGY | Age: 73
Discharge: HOME | End: 2023-08-07
Attending: INTERNAL MEDICINE
Payer: MEDICARE

## 2023-08-07 DIAGNOSIS — E78.1 HYPERTRIGLYCERIDEMIA: ICD-10-CM

## 2023-08-07 DIAGNOSIS — Z78.9 UNKNOWN VARICELLA VACCINATION STATUS: ICD-10-CM

## 2023-08-07 DIAGNOSIS — J18.9 COMMUNITY ACQUIRED PNEUMONIA, UNSPECIFIED LATERALITY: ICD-10-CM

## 2023-08-07 DIAGNOSIS — E11.9 DIET-CONTROLLED TYPE 2 DIABETES MELLITUS (HCC): ICD-10-CM

## 2023-08-07 DIAGNOSIS — Z12.5 SCREENING FOR PROSTATE CANCER: ICD-10-CM

## 2023-08-07 LAB
CHOLEST SERPL-MCNC: 77 MG/DL (ref ?–200)
COMPLEXED PSA SERPL-MCNC: 0.98 NG/ML (ref ?–4)
CREAT UR-SCNC: 185 MG/DL
EST. AVERAGE GLUCOSE BLD GHB EST-MCNC: 146 MG/DL (ref 68–126)
FASTING PATIENT LIPID ANSWER: YES
HBA1C MFR BLD: 6.7 % (ref ?–5.7)
HDLC SERPL-MCNC: 27 MG/DL (ref 40–59)
LDLC SERPL CALC-MCNC: 28 MG/DL (ref ?–100)
MICROALBUMIN UR-MCNC: 5.77 MG/DL
MICROALBUMIN/CREAT 24H UR-RTO: 31.2 UG/MG (ref ?–30)
NONHDLC SERPL-MCNC: 50 MG/DL (ref ?–130)
TRIGL SERPL-MCNC: 122 MG/DL (ref 30–149)
VLDLC SERPL CALC-MCNC: 16 MG/DL (ref 0–30)

## 2023-08-07 PROCEDURE — 82570 ASSAY OF URINE CREATININE: CPT

## 2023-08-07 PROCEDURE — 86787 VARICELLA-ZOSTER ANTIBODY: CPT

## 2023-08-07 PROCEDURE — 82043 UR ALBUMIN QUANTITATIVE: CPT

## 2023-08-07 PROCEDURE — 83036 HEMOGLOBIN GLYCOSYLATED A1C: CPT

## 2023-08-07 PROCEDURE — 36415 COLL VENOUS BLD VENIPUNCTURE: CPT

## 2023-08-07 PROCEDURE — 71046 X-RAY EXAM CHEST 2 VIEWS: CPT | Performed by: INTERNAL MEDICINE

## 2023-08-07 PROCEDURE — 80061 LIPID PANEL: CPT

## 2023-08-08 ENCOUNTER — OFFICE VISIT (OUTPATIENT)
Dept: PHYSICAL THERAPY | Age: 73
End: 2023-08-08
Attending: INTERNAL MEDICINE
Payer: MEDICARE

## 2023-08-08 PROCEDURE — 97110 THERAPEUTIC EXERCISES: CPT

## 2023-08-08 PROCEDURE — 97140 MANUAL THERAPY 1/> REGIONS: CPT

## 2023-08-08 NOTE — PROGRESS NOTES
Diagnosis:   Associated DX: Left hip pain (M25.552)      Insurance (Authorized # of Visits):  Medicare         Authorizing Physician: Dr. Lashae Jack MD visit: none scheduled  Fall Risk: standard         Precautions: n/a             Subjective: Patient states he feels about 20% better. Was able to walk around the block. Able to lift leg up to get in the car. Pain rating 0/10 with ambulation. Objective:     Date: 08/08/23   TX#: 2/10   TherEx:  NuStep level 4 x 6 minutes  Calf stretch 2 x 30 seconds  Lower trunk rotation x 10  Prone on elbows x 2 minutes  Repeated extension in lying x 5   Heel slide x 10  ER in supine 3 x 30 seconds  SLR (AAROM) 2 x 10  Manual: Passive ROM L hip x 3 minutes  Soft tissue mobilization right hip flexor x 3 minutes      HEP:  lower trunk rotation, hip flexor stretch in supine and in standing ( 3 times a day) 08/08/23 L hip ER in supine; heel slide    Assessment: Increased muscle tension noted in left hip flexor. Patient responded well with soft tissue mobilization and range of motion exercises. Patient requires assistance with straight leg raise. Goals:   Goals: (to be met in 10 visits)  Pt will improve hip ABD and ER strength to 4+/5 to increase ease with standing and walking   Pt will be able to squat to  light objects around the house with <1/10 hip pain   Pt will be independent and compliant with comprehensive HEP to maintain progress achieved in PT      Plan: Re-assess next session and continue with (L) hip ROM, strengthening, stability, balance, gait, proprioceptive exercises, patient education, and HEP progression.      Charges: 3TE       Total Timed Treatment: 45 min  Total Treatment Time: 45 min

## 2023-08-09 LAB — VZV IGG SER IA-ACNC: 1606 (ref 165–?)

## 2023-08-10 ENCOUNTER — OFFICE VISIT (OUTPATIENT)
Dept: PHYSICAL THERAPY | Age: 73
End: 2023-08-10
Attending: INTERNAL MEDICINE
Payer: MEDICARE

## 2023-08-10 PROCEDURE — 97110 THERAPEUTIC EXERCISES: CPT

## 2023-08-10 NOTE — PROGRESS NOTES
Diagnosis:   Associated DX: Left hip pain (M25.552)      Insurance (Authorized # of Visits):  Medicare         Authorizing Physician: Dr. Annia Jack MD visit: none scheduled  Fall Risk: standard         Precautions: n/a             Subjective: Patient states he had no pain this morning and he normally does. Objective:     Date: 08/08/23   TX#: 2/10 Date 08/10/23  Tx 3/10   TherEx:  NuStep level 4 x 6 minutes  Calf stretch 2 x 30 seconds  Lower trunk rotation x 10  Prone on elbows x 2 minutes  Repeated extension in lying x 5   Heel slide x 10  ER in supine 3 x 30 seconds  SLR (AAROM) 2 x 10  Manual: Passive ROM L hip x 3 minutes  Soft tissue mobilization right hip flexor x 3 minutes    TherEx:  NuStep level 4 x 6 minutes (UE only)  Calf stretch 2 x 30 seconds  Prone quad stretch by therapist 2 x 30 seconds B  Lower trunk rotation x 10  Heel slide x 10  ER in supine 3 x 30 seconds  SLR  2 x 10  Sit to stand without UE x 10  Manual:  Soft tissue mobilization right hip flexor x 3 minutes    HEP:  lower trunk rotation, hip flexor stretch in supine and in standing ( 3 times a day) 08/08/23 L hip ER in supine; heel slide    Assessment: Patient demonstrates improved strength as demonstrated by preforming straight leg raise without assistance from therapist.       Goals:   Goals: (to be met in 10 visits)  Pt will improve hip ABD and ER strength to 4+/5 to increase ease with standing and walking   Pt will be able to squat to  light objects around the house with <1/10 hip pain   Pt will be independent and compliant with comprehensive HEP to maintain progress achieved in PT      Plan: Re-assess next session and continue with (L) hip ROM, strengthening, stability, balance, gait, proprioceptive exercises, patient education, and HEP progression.      Charges: 3TE       Total Timed Treatment: 45 min  Total Treatment Time: 45 min

## 2023-08-11 ENCOUNTER — OFFICE VISIT (OUTPATIENT)
Dept: INTERNAL MEDICINE CLINIC | Facility: CLINIC | Age: 73
End: 2023-08-11
Payer: MEDICARE

## 2023-08-11 VITALS
TEMPERATURE: 98 F | WEIGHT: 216.38 LBS | OXYGEN SATURATION: 98 % | DIASTOLIC BLOOD PRESSURE: 60 MMHG | SYSTOLIC BLOOD PRESSURE: 130 MMHG | BODY MASS INDEX: 33.96 KG/M2 | HEIGHT: 67 IN | HEART RATE: 84 BPM | RESPIRATION RATE: 16 BRPM

## 2023-08-11 DIAGNOSIS — E11.9 TYPE 2 DIABETES MELLITUS WITHOUT COMPLICATION, WITHOUT LONG-TERM CURRENT USE OF INSULIN (HCC): Primary | ICD-10-CM

## 2023-08-11 DIAGNOSIS — E78.1 HYPERTRIGLYCERIDEMIA: ICD-10-CM

## 2023-08-11 PROCEDURE — 99213 OFFICE O/P EST LOW 20 MIN: CPT | Performed by: INTERNAL MEDICINE

## 2023-08-14 ENCOUNTER — APPOINTMENT (OUTPATIENT)
Dept: PHYSICAL THERAPY | Age: 73
End: 2023-08-14
Attending: INTERNAL MEDICINE
Payer: MEDICARE

## 2023-08-15 ENCOUNTER — PATIENT MESSAGE (OUTPATIENT)
Dept: INTERNAL MEDICINE CLINIC | Facility: CLINIC | Age: 73
End: 2023-08-15

## 2023-08-15 ENCOUNTER — OFFICE VISIT (OUTPATIENT)
Dept: PHYSICAL THERAPY | Age: 73
End: 2023-08-15
Attending: INTERNAL MEDICINE
Payer: MEDICARE

## 2023-08-15 PROCEDURE — 97110 THERAPEUTIC EXERCISES: CPT

## 2023-08-15 NOTE — PROGRESS NOTES
Diagnosis:   Associated DX: Left hip pain (M25.552)      Insurance (Authorized # of Visits):  Medicare         Authorizing Physician: Dr. Kyle Jack MD visit: none scheduled  Fall Risk: standard         Precautions: n/a             Subjective: Patient states that he played a game on Sunday and did too much bending. Feels that he regressed after this. Had had limited mobility since then. Objective:     Date: 08/08/23   TX#: 2/10 Date 08/10/23  Tx 3/10 Date 08/15/23   Tx 4/10   TherEx:  NuStep level 4 x 6 minutes  Calf stretch 2 x 30 seconds  Lower trunk rotation x 10  Prone on elbows x 2 minutes  Repeated extension in lying x 5   Heel slide x 10  ER in supine 3 x 30 seconds  SLR (AAROM) 2 x 10  Manual: Passive ROM L hip x 3 minutes  Soft tissue mobilization right hip flexor x 3 minutes    TherEx:  NuStep level 4 x 6 minutes (UE only)  Calf stretch 2 x 30 seconds  Prone quad stretch by therapist 2 x 30 seconds B  Lower trunk rotation x 10  Heel slide x 10  ER in supine 3 x 30 seconds  SLR  2 x 10  Sit to stand without UE x 10  Manual:  Soft tissue mobilization right hip flexor x 3 minutes  TherEx:  NuStep level 4 x 6 minutes (UE only)  Hip extension stretch in supine 4 x 30 seconds  Hip adductor stretch 3 x 30 seconds  Lower trunk rotation x 10  Heel slide x 10  ER in supine 3 x 30 seconds  SLR  x 10  Bridges x 20  Manual:  Soft tissue mobilization right hip flexor x 5 minutes    HEP:  lower trunk rotation, hip flexor stretch in supine and in standing ( 3 times a day) 08/08/23 L hip ER in supine; heel slide    Assessment: Increased restrictions noted at left hip flexors than previous visit. Although patients transfers (sit to stand) are more difficult than previous session patients functional hip strength remains about the same.       Goals:   Goals: (to be met in 10 visits)  Pt will improve hip ABD and ER strength to 4+/5 to increase ease with standing and walking   Pt will be able to squat to  light objects around the house with <1/10 hip pain   Pt will be independent and compliant with comprehensive HEP to maintain progress achieved in PT      Plan: Re-assess next session and continue with (L) hip ROM, strengthening, stability, balance, gait, proprioceptive exercises, patient education, and HEP progression.      Charges: 3TE       Total Timed Treatment: 45 min  Total Treatment Time: 45 min

## 2023-08-15 NOTE — TELEPHONE ENCOUNTER
From: Zuleyma Beauchamp  To: Harriet Lobo MD  Sent: 8/15/2023 8:59 AM CDT  Subject: BLOODY NOSE    I probably should have mentioned this to you last Friday but I have been experiencing an occasional bloody nose (left nostril only) over the past month or so. Lately however, I had a bloody nose last Friday after our appointment, then again on Sunday and, most recently, this morning. They don't last long, usually 5-10 minutes. Is this something I should be concerned with?

## 2023-08-17 ENCOUNTER — OFFICE VISIT (OUTPATIENT)
Dept: PHYSICAL THERAPY | Age: 73
End: 2023-08-17
Attending: INTERNAL MEDICINE
Payer: MEDICARE

## 2023-08-17 PROCEDURE — 97110 THERAPEUTIC EXERCISES: CPT

## 2023-08-17 NOTE — PROGRESS NOTES
Diagnosis:   Associated DX: Left hip pain (M25.552)      Insurance (Authorized # of Visits):  Medicare         Authorizing Physician: Dr. Khoa Choi Next MD visit: none scheduled  Fall Risk: standard         Precautions: n/a             Subjective: Patient states that he is doing better than the other day. He was able to put his socks on by himself. Wife is helping stretch. Objective:     Date: 08/08/23   TX#: 2/10 Date 08/10/23  Tx 3/10 Date 08/15/23   Tx 4/10   TherEx:  NuStep level 4 x 6 minutes  Calf stretch 2 x 30 seconds  Lower trunk rotation x 10  Prone on elbows x 2 minutes  Repeated extension in lying x 5   Heel slide x 10  ER in supine 3 x 30 seconds  SLR (AAROM) 2 x 10  Manual: Passive ROM L hip x 3 minutes  Soft tissue mobilization right hip flexor x 3 minutes    TherEx:  NuStep level 4 x 6 minutes (UE only)  Calf stretch 2 x 30 seconds  Prone quad stretch by therapist 2 x 30 seconds B  Lower trunk rotation x 10  Heel slide x 10  ER in supine 3 x 30 seconds  SLR  2 x 10  Sit to stand without UE x 10  Manual:  Soft tissue mobilization right hip flexor x 3 minutes  TherEx:  NuStep level 4 x 6 minutes (UE only)  Hip extension stretch in supine 4 x 30 seconds  Hip adductor stretch 3 x 30 seconds  Lower trunk rotation x 10  Heel slide x 10  ER in supine 3 x 30 seconds  SLR  x 10  Bridges x 20  S/L hip abduction x 20  Step up 6inch step x 20 (forward and lateral)   HEP:  lower trunk rotation, hip flexor stretch in supine and in standing ( 3 times a day) 08/08/23 L hip ER in supine; heel slide    Assessment: Advanced to closed chain exercises today such as step ups to promote improve stair negotiation.        Goals:   Goals: (to be met in 10 visits)  Pt will improve hip ABD and ER strength to 4+/5 to increase ease with standing and walking   Pt will be able to squat to  light objects around the house with <1/10 hip pain   Pt will be independent and compliant with comprehensive HEP to maintain progress achieved in PT      Plan: Re-assess next session and continue with (L) hip ROM, strengthening, stability, balance, gait, proprioceptive exercises, patient education, and HEP progression.      Charges: 3TE       Total Timed Treatment: 45 min  Total Treatment Time: 45 min

## 2023-08-22 ENCOUNTER — OFFICE VISIT (OUTPATIENT)
Dept: PHYSICAL THERAPY | Age: 73
End: 2023-08-22
Attending: INTERNAL MEDICINE
Payer: MEDICARE

## 2023-08-22 PROCEDURE — 97110 THERAPEUTIC EXERCISES: CPT

## 2023-08-22 NOTE — PROGRESS NOTES
Diagnosis:   Associated DX: Left hip pain (M25.552)      Insurance (Authorized # of Visits):  Medicare         Authorizing Physician: Dr. Mahi Dolan Next MD visit: none scheduled  Fall Risk: standard         Precautions: n/a             Subjective: Patient states that he is doing better than the other day. He was able to put his socks on by himself. Wife is helping stretch.     Objective:     Date: 08/08/23   TX#: 2/10 Date 08/10/23  Tx 3/10 Date 08/15/23   Tx 4/10 Date 08/22/23   Tx 5/10   TherEx:  NuStep level 4 x 6 minutes  Calf stretch 2 x 30 seconds  Lower trunk rotation x 10  Prone on elbows x 2 minutes  Repeated extension in lying x 5   Heel slide x 10  ER in supine 3 x 30 seconds  SLR (AAROM) 2 x 10  Manual: Passive ROM L hip x 3 minutes  Soft tissue mobilization right hip flexor x 3 minutes    TherEx:  NuStep level 4 x 6 minutes (UE only)  Calf stretch 2 x 30 seconds  Prone quad stretch by therapist 2 x 30 seconds B  Lower trunk rotation x 10  Heel slide x 10  ER in supine 3 x 30 seconds  SLR  2 x 10  Sit to stand without UE x 10  Manual:  Soft tissue mobilization right hip flexor x 3 minutes  TherEx:  NuStep level 4 x 6 minutes (UE only)  Hip extension stretch in supine 4 x 30 seconds  Hip adductor stretch 3 x 30 seconds  Lower trunk rotation x 10  Heel slide x 10  ER in supine 3 x 30 seconds  SLR  x 10  Bridges x 20  S/L hip abduction x 20  Step up 6inch step x 20 (forward and lateral) TherEx:  NuStep level 4 x 6 minutes (UE only)  Calf stretch on board 2 x 45 seconds  Hip extension stretch in supine 3 x 45 seconds  Hip adductor stretch 2 x 45 seconds  ER in supine 3 x 30 seconds  Heel slide in supine x 20  SLR  x 10  Bridges x 20  Standing lumbar extension over table x 20  Step up and over x 20   HEP:  lower trunk rotation, hip flexor stretch in supine and in standing ( 3 times a day) 08/08/23 L hip ER in supine; heel slide 08/22/23 SLR, bridges     Assessment: Patient demonstrates improved mobility with transfers (supine to sit, sit to stand). Updated home exercise program this visit to include core and hip flexor strengthening at home. Reviewed alternative position for hip ER stretch in seated. Goals:   Goals: (to be met in 10 visits)  Pt will improve hip ABD and ER strength to 4+/5 to increase ease with standing and walking   Pt will be able to squat to  light objects around the house with <1/10 hip pain   Pt will be independent and compliant with comprehensive HEP to maintain progress achieved in PT      Plan: Re-assess next session and continue with (L) hip ROM, strengthening, stability, balance, gait, proprioceptive exercises, patient education, and HEP progression.      Charges: 3TE       Total Timed Treatment: 45 min  Total Treatment Time: 45 min

## 2023-08-31 ENCOUNTER — OFFICE VISIT (OUTPATIENT)
Dept: PHYSICAL THERAPY | Age: 73
End: 2023-08-31
Attending: INTERNAL MEDICINE
Payer: MEDICARE

## 2023-08-31 PROCEDURE — 97110 THERAPEUTIC EXERCISES: CPT

## 2023-09-13 ENCOUNTER — PATIENT MESSAGE (OUTPATIENT)
Facility: CLINIC | Age: 73
End: 2023-09-13

## 2023-09-13 ENCOUNTER — OFFICE VISIT (OUTPATIENT)
Facility: CLINIC | Age: 73
End: 2023-09-13
Payer: MEDICARE

## 2023-09-13 VITALS
HEART RATE: 68 BPM | HEIGHT: 67 IN | DIASTOLIC BLOOD PRESSURE: 70 MMHG | RESPIRATION RATE: 16 BRPM | SYSTOLIC BLOOD PRESSURE: 122 MMHG | OXYGEN SATURATION: 98 % | BODY MASS INDEX: 34.53 KG/M2 | WEIGHT: 220 LBS

## 2023-09-13 DIAGNOSIS — J20.9 ACUTE BRONCHITIS, UNSPECIFIED ORGANISM: Primary | ICD-10-CM

## 2023-09-13 DIAGNOSIS — R49.0 HOARSENESS: ICD-10-CM

## 2023-09-13 DIAGNOSIS — B37.81 THRUSH OF MOUTH AND ESOPHAGUS: ICD-10-CM

## 2023-09-13 DIAGNOSIS — B37.0 THRUSH OF MOUTH AND ESOPHAGUS: ICD-10-CM

## 2023-09-13 DIAGNOSIS — J32.9 CHRONIC RHINOSINUSITIS: ICD-10-CM

## 2023-09-13 DIAGNOSIS — J31.0 CHRONIC RHINOSINUSITIS: ICD-10-CM

## 2023-09-13 PROCEDURE — 99214 OFFICE O/P EST MOD 30 MIN: CPT | Performed by: NURSE PRACTITIONER

## 2023-09-13 RX ORDER — CLOTRIMAZOLE 10 MG/1
10 LOZENGE ORAL; TOPICAL
Qty: 70 LOZENGE | Refills: 0 | Status: SHIPPED | OUTPATIENT
Start: 2023-09-13

## 2023-09-19 ENCOUNTER — APPOINTMENT (OUTPATIENT)
Dept: PHYSICAL THERAPY | Age: 73
End: 2023-09-19
Attending: INTERNAL MEDICINE
Payer: MEDICARE

## 2023-09-27 ENCOUNTER — OFFICE VISIT (OUTPATIENT)
Facility: CLINIC | Age: 73
End: 2023-09-27
Payer: MEDICARE

## 2023-09-27 VITALS
BODY MASS INDEX: 34.53 KG/M2 | HEART RATE: 85 BPM | SYSTOLIC BLOOD PRESSURE: 140 MMHG | HEIGHT: 67 IN | DIASTOLIC BLOOD PRESSURE: 62 MMHG | WEIGHT: 220 LBS | RESPIRATION RATE: 16 BRPM | OXYGEN SATURATION: 98 %

## 2023-09-27 DIAGNOSIS — B37.0 THRUSH OF MOUTH AND ESOPHAGUS: ICD-10-CM

## 2023-09-27 DIAGNOSIS — Z87.891 HISTORY OF TOBACCO USE: Primary | ICD-10-CM

## 2023-09-27 DIAGNOSIS — J20.9 ACUTE BRONCHITIS, UNSPECIFIED ORGANISM: ICD-10-CM

## 2023-09-27 DIAGNOSIS — B37.81 THRUSH OF MOUTH AND ESOPHAGUS: ICD-10-CM

## 2023-09-27 DIAGNOSIS — Z72.0 TOBACCO ABUSE: ICD-10-CM

## 2023-09-27 DIAGNOSIS — R49.0 HOARSENESS: ICD-10-CM

## 2023-09-27 PROCEDURE — 99214 OFFICE O/P EST MOD 30 MIN: CPT | Performed by: NURSE PRACTITIONER

## 2023-09-27 RX ORDER — FLUCONAZOLE 100 MG/1
100 TABLET ORAL DAILY
Qty: 7 TABLET | Refills: 0 | Status: SHIPPED | OUTPATIENT
Start: 2023-09-27

## 2023-09-27 NOTE — PATIENT INSTRUCTIONS
Obtain LDCT scan and followup in 2 weeks by [facility] to review results  Start fluconazole today and take for 1 week  Please contact office at [phone] with any decline in respiratory status, questions or concerns

## 2023-10-05 ENCOUNTER — HOSPITAL ENCOUNTER (OUTPATIENT)
Dept: CT IMAGING | Age: 73
Discharge: HOME | End: 2023-10-05
Attending: NURSE PRACTITIONER
Payer: MEDICARE

## 2023-10-05 DIAGNOSIS — Z87.891 HISTORY OF TOBACCO USE: ICD-10-CM

## 2023-10-05 PROCEDURE — 71271 CT THORAX LUNG CANCER SCR C-: CPT | Performed by: NURSE PRACTITIONER

## 2023-10-09 ENCOUNTER — TELEPHONE (OUTPATIENT)
Dept: ORTHOPEDICS CLINIC | Facility: CLINIC | Age: 73
End: 2023-10-09

## 2023-10-09 DIAGNOSIS — M25.552 LEFT HIP PAIN: Primary | ICD-10-CM

## 2023-10-09 NOTE — TELEPHONE ENCOUNTER
Future Appointments   Date Time Provider Osorio Chiang   11/20/2023  9:20 AM Lalitha Ruano MD [facility]     Xray ordered, please schedule with patient peer protocol >3 mos. Thank you!

## 2023-10-10 NOTE — TELEPHONE ENCOUNTER
Pt completed xrays ordered by us for the original 8/2/23 appt which pt cancelled. Pt refused to do updated imaging for 11/2023 appt.     Jodie Shen   to [facility]   NP    10/10/23 10:41 AM  Patient stated he does not want anymore xrays because he had some done in july

## 2023-10-11 NOTE — PROGRESS NOTES
[facility] General Pulmonary Progress Note    History of Present Illness:  Antonio Scott is a 74year old male, former smoker,  with significant PMH of bronchitis and sinusitis who presents today to review LDCT scan. Feels well. Thrush and hoarseness completely resolved, denies SOB off of inhalers. Previously 9/27/2023  a 74year old male who presents for follow - up. States the hoarseness has resolved. States no new issues. Denies cough or SOB. Previously 9/13/23  a 74year old male who presents for c/o hoarseness that he has noticed over the past month. On trelegy since May feels like he needs to clear his throat often and the hoarseness. Breathing is significantly better. Rinsing his mouth after each inhaler use. No f/c/ns. No s/sx of GERD. No rhinorhea or PND. Previous 102023  a 74year old male who presents for follow - up. States the breathing is much better on the Trelegy. Reports sleeping through the night, no cough, SOB. Using nasal wash, flonase, Trelegy; has not needed albuterol inhlaer. Feels back to normal.     Previously Dr Guzmán Jan 5/23/23  a 74year old male former smoker (quit 2017, 50 pack years) with significant PMH of DM, HTN, s/p PPM  who presents today for evaluation of dyspnea. He reports having been well until around March 2023 after awakening with dyspnea, cough and wheeze. He was seen/managed in ER treated for bronchospasm, possible pneumonia and given prednisone, albuterol and zpak. He did have some improvement, however his symptoms worsened and he was evaluated again in ER in April x 2 and again in early may 2023. He now presents today for further evaluation. He admits the albuterol helps but only lasts about 2 hours, then needs to take it again. He has had ongoing wheezing for the past few months. He has had ongoing sinus congestion/drainage leading to frequent cough and throat clearing with clear phlegm. No blood. No fevers. No chest pain/pressure.    Has had bronchitis episodes in the past several years, but never diagnosed with asthma or COPD. Has noted sneezing when in his office at home, not aware of any new furniture, home decor, no renovations or changes. Retired, worked as , not aware of any significant exposures. Past Medical History:   Past Medical History:   Diagnosis Date    Abdominal hernia 1973 and 2001    ALCOHOL USE     1-2 times/wk    Arthritis     Back pain 2016    Bronchitis 03/2023    Chronic left-sided low back pain     Diabetes ([address]     diet controlled - dx 2/2020    Easy bruising 2019    Elevated lipase 11/25/2015    Flatulence/gas pain/belching Randomly    Gout     Hernia, abdominal 1973    1973 hernia repair.  2001 double hernia repair    Left knee pain     Obesity     Pacemaker 02/2018    Pneumonia due to organism Apr 2023    Pulmonary emphysema [facility]) Mother    Wears glasses Reading glasses only        Past Surgical History:   Past Surgical History:   Procedure Laterality Date    CARDIAC PACEMAKER PLACEMENT  2018    CATARACT      COLONOSCOPY  2014    COLONOSCOPY N/A 10/08/2021    Procedure: COLONOSCOPY with cold snare polypectomy;  Surgeon: Jv Ferguson MD;  Location: [facility] ENDOSCOPY    DENTAL SURGERY PROCEDURE  2009    dental implants    HERNIA SURGERY Right 1973    HERNIA SURGERY Bilateral 2001    LASIK Bilateral 2003    OTHER SURGICAL HISTORY      Dental Implants 6 years ago    PACEMAKER/DEFIBRILLATOR      2017    REPAIR ING HERNIA,5+Y/O,REDUCIBL      REPAIR OF NASAL SEPTUM N/A 2003    TONSILLECTOMY  65's    VASECTOMY  1978         Family Medical History:   Family History   Problem Relation Age of Onset    Other (Other) Father         dementia    Other (Other) Mother         COPD    Asthma Mother     Pulmonary Disease Mother         Social History: Social History    Socioeconomic History      Marital status:       Spouse name: Not on file      Number of children: Not on file      Years of education: Not on file      Highest education level: Not on file    Occupational History      Not on file    Tobacco Use      Smoking status: Former        Packs/day: 1.00        Years: 50.00        Additional pack years: 0.00        Total pack years: 50.00        Types: Cigarettes        Quit date: 10/30/2017        Years since quittin.9        Passive exposure: Past (pt's parents both smoked in the home)      Smokeless tobacco: Never    Vaping Use      Vaping Use: Never used    Substance and Sexual Activity      Alcohol use: Not Currently        Alcohol/week: 1.0 standard drink of alcohol        Types: 1 Standard drinks or equivalent per week        Comment: 1/2 drink weekly       Drug use: Never      Sexual activity: Not on file    Other Topics      Concerns:        Caffeine Concern: Not Asked        Exercise: Not Asked        Seat Belt: Not Asked        Special Diet: Not Asked        Stress Concern: Not Asked        Weight Concern: Not Asked    Social History Narrative      Not on file    Social Determinants of Health  Financial Resource Strain: Not on file  Food Insecurity: Not on file  Transportation Needs: Not on file  Physical Activity: Not on file  Stress: Not on file  Social Connections: Not on file  Housing Stability: Not on file     Medications:   Current Outpatient Medications   Medication Sig Dispense Refill    fluconazole 100 MG Oral Tab Take 1 tablet (100 mg total) by mouth daily. 7 tablet 0    clotrimazole 10 MG Mouth/Throat Jaleel Take 1 lozenge (10 mg total) by mouth 5 (five) times daily. 70 lozenge 0    fluticasone-umeclidin-vilant (TRELEGY ELLIPTA) 100-62.5-25 MCG/ACT Inhalation Aerosol Powder, Breath Activated Inhale 1 puff into the lungs daily. (Patient not taking: Reported on 2023) 1 each 5    Glucose Blood (CONTOUR NEXT TEST) In Vitro Strip TEST ONCE DAILY 100 strip 3    Microlet Lancets Does not apply Misc Test once daily. 100 each 3    rosuvastatin 10 MG Oral Tab Take 1 tablet (10 mg total) by mouth nightly.  [address] tablet 3    Misc Natural Products (GLUCOSAMINE CHONDROITIN ADV) Oral Tab Take 1 tablet by mouth daily. latanoprost 0.005 % Ophthalmic Solution Place 1 drop into both eyes nightly. Multiple Vitamins-Minerals (PRESERVISION AREDS 2) Oral Cap take two tablets by mouth daily         Review of Systems: Review of Systems   Constitutional:  Negative for activity change, appetite change, chills, diaphoresis, fatigue, fever and unexpected weight change. HENT:  Negative for congestion, postnasal drip, rhinorrhea, sinus pressure, sinus pain, sneezing, sore throat, trouble swallowing and voice change. Respiratory:  Negative for apnea, cough, choking, chest tightness, shortness of breath, wheezing and stridor. Cardiovascular:  Negative for chest pain, palpitations and leg swelling. Musculoskeletal:  Negative for arthralgias. Skin:  Negative for pallor and rash. Allergic/Immunologic: Negative for immunocompromised state. Neurological:  Negative for dizziness and headaches. Hematological:  Negative for adenopathy. Physical Exam:  There were no vitals taken for this visit. Constitutional: alert, cooperative. No acute distress. HEENT: Head NC/AT. Respiratory: Thorax symmetrical with no labored breathing. Neurologic: A&Ox3. No gross motor deficits. Skin: dry  Psych: Calm, cooperative. Pleasant affect. Results:  Personally reviewed    WBC: 8.8, done on 5/7/2023. HGB: 12.7, done on 5/7/2023. PLT: 248, done on 5/7/2023. Glucose: 133, done on 5/7/2023. Cr: 1.18, done on 5/7/2023. GFR(AA): 62, done on 7/7/2022. GFR (non-AA): 54, done on 7/7/2022. CA: 8.3, done on 5/7/2023. Na: 140, done on 5/7/2023. K: 4.4, done on 5/7/2023. Cl: 109, done on 5/7/2023. CO2: 26, done on 5/7/2023. Last ALB was 3.6% done on 5/7/2023. CT LUNG LD SCREENING(CPT=71271)    Result Date: 10/5/2023  CONCLUSION:  1. Lung-RADS Category  1:  Negative. No evidence of primary lung cancer.   2. Lung-RADS Category S:    Negative. 3. Other incidental findings:  None   RECOMMENDATIONS:  LUNG-RADS 1: Continued routine annual LDCT lung screening. Suggest next exam on or around 10/6/2024. CT Lung Screening Reporting and Data System (Lung-RADS 1.1)    Lung Cancer Specific Category   ACR -Guidelines Based Follow-up   Category    Assessment                  Recommendation   0  Incomplete  Further imaging recommended    1  Negative  Routine annual LDCT screen (age [de-identified])   2  Benign appearance or behavior  Routine annual LDCT screen (age [de-identified])   3  Probably benign  Interval short-term diagnostic LDCT (6 months)   4a  Suspicious  Short-term follow-up LDCT or PET/CT (3 months)   4b  Suspicious  Multidisciplinary Conference Review   4x  Suspicious  Category 3 or 4 nodules with other suspicious features   S  Other potentially significant findings  Follow-up based on abnormality  LungRAD scores of 3, 4A, and 4B will be reviewed in the Multidisciplinary Thoracic Oncology Clinic      LOCATION:  [place]    Dictated by (CST): Sofy Alexander MD on 10/05/2023 at 12:59 PM     Finalized by (CST): Sofy Alexander MD on 10/05/2023 at 1:13 PM       XR CHEST PA + LAT CHEST (CPT=71046)    Result Date: 8/7/2023  CONCLUSION:  No focal consolidation. LOCATION:  [facility]   Dictated by (CST): Campbell Moeller MD on 8/07/2023 at 9:08 AM     Finalized by (CST): Campbell Moeller MD on 8/07/2023 at 9:09 AM         Assessment/Plan:  #1. Acute/chronic bronchitis  Suspecting underlying allergies given noted eosinophilia on CBC, could be post infectious as well. May have underlying COPD.  Also has contributor of coughing related to sinus drainage  Treat rhinosinusitis as below  5/2023 PFTs with no obstruction but +air trapping and mildly reduced DLCO 64%  Start brief prednisone regimen  Start trelegy 200 daily, use albuterol PRN; plan to deescalate inhaler once improved  6/2023 will continue Trelegy 200 for the next 1-2 weeks and transition to Trelegy 100 once air quality improves. May use TRAY prn SOB. Pt aware to call office if increased SOB. Will followup in 3 months and determine next steps; pt would like to wean off inhaler if possible  9/2023 Breathing is significantly improved. Will stop Trelegy due to thrush and pt request to stop inhaler. CPFT showed no obstruction. Will evaluate in 2 weeks at followup  9/27/23 Stable; denies c/o SOB, HERNANDEZ. Will remain off inhalers and call office if needed  10/2023 Stable; no complaints off inhalers     #2. Chronic rhinosinusitis  Seen/following with ENT, advised nasal rinses and nasal steroids  6/2023 continue saline nasal wash and flonase as directed  9/2023 Using nasal spray prn; denies complaints  10/2023 denies complaints     #3. Tobacco abuse  Former smoker, 50 pack years, quit 2017  Had last LDCT August 2022 - lung rads 2, plan next CT August 2023  10/2023 LDCT scan Lung Rads 1-no nodules noted  Lung Cancer Counseling and Shared Decision Making Session in an Asymptomatic Smoker/Former Smoker   Greta Kaye is a 74year old male without current symptoms of lung cancer. Smoking status:   Former   1.00 Packs/day   For 52.00 Years      Types:   Cigarettes      Start date:   1/1/1965      Quit date:   10/30/2017      Smokeless tobacco:   Never        He received information on the importance of adherence to annual lung cancer Low Dose CT (LDCT) screening, the impact of his comorbidities and his ability or willingness to undergo diagnosis and treatment. he is in agreement and an order will be placed for CT LUNG LD SCREENING(CPT=71271). We counseled the importance of maintaining cigarette smoking abstinence if he is a former smoker and the importance of smoking cessation if he is a current smoker and we discussed and furnished information about tobacco cessation interventions. #4Shonna Dayday  Will stop Trelegy and start mycelex troches  Will followup in 2 weeks  9/27/23 Almost completely resolved.  Will give 1 week of fluconazole and followup in 2 weeks for followup  10/2023 Resolved     #5. Hoarseness  Likely r/t thrush from Trelegy  Will monitor and followup in 2 weeks  9/27/23 Resolved         DOTTIE Mcgovern  [facility] Pul[facility]   10/11/2023    This visit is conducted using Telemedicine with live, interactive video and audio. Patient has been referred to the Health system website at [email or web address] to review the yearly Consent to Treat document. Patient understands and accepts financial responsibility for any deductible, co-insurance and/or co-pays associated with this service.

## 2023-10-11 NOTE — PATIENT INSTRUCTIONS
Obtain LDCT scan in 1 year and followup to review results  Please contact office at [phone] with any decline in respiratory status, questions or concerns

## 2023-11-01 ENCOUNTER — PATIENT MESSAGE (OUTPATIENT)
Facility: CLINIC | Age: 73
End: 2023-11-01

## 2023-11-01 ENCOUNTER — TELEPHONE (OUTPATIENT)
Facility: CLINIC | Age: 73
End: 2023-11-01

## 2023-11-01 NOTE — TELEPHONE ENCOUNTER
From: Eunice Beauchamp  To: Mook Mercado  Sent: 11/1/2023 3:45 PM CDT  Subject: BREATHING ISSUES    When I last saw you on September 27th, all was well with my breathing. I had stopped using Trelegy earlier in the month and all has been great until this afternoon when I started experiencing some chest congestion, wheezing and coughing up some phlegm. Should I just ignore it for a while longer? Should I restart with the Trelegy? I am looking to you for some guidance.

## 2023-11-01 NOTE — TELEPHONE ENCOUNTER
Called/reviewed with patient re: cough and chest congestion. He felt well until today after lunch felt more congested with productive cough. No dyspnea. No pain. No f/c/s    Advised to resume trelegy 100 and albuterol PRN. Will ask clinical team to check in the next day or two.   Also advised patient to call back if no better     Omid Wilhelm MD

## 2023-11-01 NOTE — TELEPHONE ENCOUNTER
Pt message: When I last saw you on September 27th, all was well with my breathing. I had stopped using Trelegy earlier in the month and all has been great until this afternoon when I started experiencing some chest congestion, wheezing and coughing up some phlegm. Should I just ignore it for a while longer? Should I restart with the Trelegy? I am looking to you for some guidance. Pt called, states cough started today. Coughing no more than 1 tsp. Denies, SOB and fever. States thrush has resolved. Asking if he should go back to using Trelegy. Last saw Donna SINCLAIR, on 10/11 via [facility]. She ordered Mycelex troches and wanted the pt to follow up in 2 weeks. Last used Trelegy at the beginning of September. Will notify Dr. Tabitha Valdes.

## 2023-11-03 ENCOUNTER — APPOINTMENT (OUTPATIENT)
Dept: GENERAL RADIOLOGY | Age: 73
End: 2023-11-03
Attending: EMERGENCY MEDICINE
Payer: MEDICARE

## 2023-11-03 ENCOUNTER — HOSPITAL ENCOUNTER (EMERGENCY)
Age: 73
Discharge: LEFT BEFORE DISCHARGE | End: 2023-11-03
Attending: EMERGENCY MEDICINE
Payer: MEDICARE

## 2023-11-03 ENCOUNTER — TELEPHONE (OUTPATIENT)
Facility: CLINIC | Age: 73
End: 2023-11-03

## 2023-11-03 VITALS
SYSTOLIC BLOOD PRESSURE: 150 MMHG | RESPIRATION RATE: 28 BRPM | HEIGHT: 68 IN | WEIGHT: 220 LBS | HEART RATE: 101 BPM | TEMPERATURE: 100 F | DIASTOLIC BLOOD PRESSURE: 65 MMHG | BODY MASS INDEX: 33.34 KG/M2 | OXYGEN SATURATION: 96 %

## 2023-11-03 DIAGNOSIS — R53.1 WEAKNESS: ICD-10-CM

## 2023-11-03 DIAGNOSIS — U07.1 COVID-19: Primary | ICD-10-CM

## 2023-11-03 DIAGNOSIS — Z53.29 LEFT AGAINST MEDICAL ADVICE: ICD-10-CM

## 2023-11-03 LAB
ALBUMIN SERPL-MCNC: 3.7 G/DL (ref 3.4–5)
ALBUMIN/GLOB SERPL: 1 {RATIO} (ref 1–2)
ALP LIVER SERPL-CCNC: 63 U/L
ALT SERPL-CCNC: 22 U/L
ANION GAP SERPL CALC-SCNC: 8 MMOL/L (ref 0–18)
AST SERPL-CCNC: 25 U/L (ref 15–37)
ATRIAL RATE: 53 BPM
BASOPHILS # BLD AUTO: 0.03 X10(3) UL (ref 0–0.2)
BASOPHILS NFR BLD AUTO: 0.3 %
BILIRUB SERPL-MCNC: 1 MG/DL (ref 0.1–2)
BUN BLD-MCNC: 16 MG/DL (ref 9–23)
CALCIUM BLD-MCNC: 8.4 MG/DL (ref 8.5–10.1)
CHLORIDE SERPL-SCNC: 103 MMOL/L (ref 98–112)
CO2 SERPL-SCNC: 26 MMOL/L (ref 21–32)
CREAT BLD-MCNC: 1.38 MG/DL
EGFRCR SERPLBLD CKD-EPI 2021: 53 ML/MIN/1.73M2 (ref 60–?)
EOSINOPHIL # BLD AUTO: 0.03 X10(3) UL (ref 0–0.7)
EOSINOPHIL NFR BLD AUTO: 0.3 %
ERYTHROCYTE [DISTWIDTH] IN BLOOD BY AUTOMATED COUNT: 17.9 %
GLOBULIN PLAS-MCNC: 3.7 G/DL (ref 2.8–4.4)
GLUCOSE BLD-MCNC: 132 MG/DL (ref 70–99)
HCT VFR BLD AUTO: 43.4 %
HGB BLD-MCNC: 13.8 G/DL
IMM GRANULOCYTES # BLD AUTO: 0.06 X10(3) UL (ref 0–1)
IMM GRANULOCYTES NFR BLD: 0.7 %
LACTATE SERPL-SCNC: 1.7 MMOL/L (ref 0.4–2)
LYMPHOCYTES # BLD AUTO: 0.77 X10(3) UL (ref 1–4)
LYMPHOCYTES NFR BLD AUTO: 8.8 %
MCH RBC QN AUTO: 25.6 PG (ref 26–34)
MCHC RBC AUTO-ENTMCNC: 31.8 G/DL (ref 31–37)
MCV RBC AUTO: 80.5 FL
MONOCYTES # BLD AUTO: 1.38 X10(3) UL (ref 0.1–1)
MONOCYTES NFR BLD AUTO: 15.7 %
NEUTROPHILS # BLD AUTO: 6.5 X10 (3) UL (ref 1.5–7.7)
NEUTROPHILS # BLD AUTO: 6.5 X10(3) UL (ref 1.5–7.7)
NEUTROPHILS NFR BLD AUTO: 74.2 %
OSMOLALITY SERPL CALC.SUM OF ELEC: 287 MOSM/KG (ref 275–295)
PLATELET # BLD AUTO: 170 10(3)UL (ref 150–450)
POTASSIUM SERPL-SCNC: 4.2 MMOL/L (ref 3.5–5.1)
PROT SERPL-MCNC: 7.4 G/DL (ref 6.4–8.2)
Q-T INTERVAL: 408 MS
QRS DURATION: 172 MS
QTC CALCULATION (BEZET): 546 MS
R AXIS: -79 DEGREES
RBC # BLD AUTO: 5.39 X10(6)UL
SARS-COV-2 RNA RESP QL NAA+PROBE: DETECTED
SODIUM SERPL-SCNC: 137 MMOL/L (ref 136–145)
T AXIS: 86 DEGREES
TROPONIN I SERPL HS-MCNC: 12 NG/L
VENTRICULAR RATE: 108 BPM
WBC # BLD AUTO: 8.8 X10(3) UL (ref 4–11)

## 2023-11-03 PROCEDURE — 99284 EMERGENCY DEPT VISIT MOD MDM: CPT

## 2023-11-03 PROCEDURE — 83605 ASSAY OF LACTIC ACID: CPT | Performed by: EMERGENCY MEDICINE

## 2023-11-03 PROCEDURE — 93005 ELECTROCARDIOGRAM TRACING: CPT

## 2023-11-03 PROCEDURE — 96360 HYDRATION IV INFUSION INIT: CPT

## 2023-11-03 PROCEDURE — 99285 EMERGENCY DEPT VISIT HI MDM: CPT

## 2023-11-03 PROCEDURE — 71045 X-RAY EXAM CHEST 1 VIEW: CPT | Performed by: EMERGENCY MEDICINE

## 2023-11-03 PROCEDURE — 93010 ELECTROCARDIOGRAM REPORT: CPT

## 2023-11-03 PROCEDURE — 87040 BLOOD CULTURE FOR BACTERIA: CPT | Performed by: EMERGENCY MEDICINE

## 2023-11-03 PROCEDURE — 80053 COMPREHEN METABOLIC PANEL: CPT | Performed by: EMERGENCY MEDICINE

## 2023-11-03 PROCEDURE — 84484 ASSAY OF TROPONIN QUANT: CPT | Performed by: EMERGENCY MEDICINE

## 2023-11-03 PROCEDURE — 36415 COLL VENOUS BLD VENIPUNCTURE: CPT

## 2023-11-03 PROCEDURE — 85025 COMPLETE CBC W/AUTO DIFF WBC: CPT | Performed by: EMERGENCY MEDICINE

## 2023-11-03 NOTE — TELEPHONE ENCOUNTER
Message sent to Dr. Tod Valderrama to find out if Rx should still be sent to pharmacy. Will await response from Dr. Tod Valderrama.

## 2023-11-03 NOTE — TELEPHONE ENCOUNTER
Please tell patient his testosterone levels are in normal range. Spoke with patient and wife, pt is going to the ER. Will ask Dr. Dariusz Jefferson if he still wants to order zpack and prednisone.

## 2023-11-03 NOTE — DISCHARGE INSTRUCTIONS
If you change mind about admission, worsening symptoms, new complaints return immediately to the hospital.  You can return at any time. We have not ruled out urinary tract infection as we were unable to get a urine.

## 2023-11-03 NOTE — TELEPHONE ENCOUNTER
Pt called. Noticed hoarse voice, states he is wheezing. Did not start Trelegy like he was advised to do. Claims to be wheezing. TE routed to Dr. Nara Davis and Kae SINCLAIR.

## 2023-11-03 NOTE — TELEPHONE ENCOUNTER
Per Dr. Jere Bush: Please send order for prednisone taper (usual dose) and zpak.   Please have patient f/u Donna early next week

## 2023-11-03 NOTE — ED QUICK NOTES
Pt and family updated on plan of care. No distress. Urine sample requested. Pt unable to urinated at this time.

## 2023-11-04 ENCOUNTER — PATIENT MESSAGE (OUTPATIENT)
Dept: INTERNAL MEDICINE CLINIC | Facility: CLINIC | Age: 73
End: 2023-11-04

## 2023-11-06 ENCOUNTER — TELEMEDICINE (OUTPATIENT)
Dept: INTERNAL MEDICINE CLINIC | Facility: CLINIC | Age: 73
End: 2023-11-06
Payer: MEDICARE

## 2023-11-06 DIAGNOSIS — U07.1 COVID-19 VIRUS INFECTION: Primary | ICD-10-CM

## 2023-11-06 DIAGNOSIS — E11.9 TYPE 2 DIABETES MELLITUS WITHOUT COMPLICATION, WITHOUT LONG-TERM CURRENT USE OF INSULIN (HCC): ICD-10-CM

## 2023-11-06 DIAGNOSIS — N17.9 AKI (ACUTE KIDNEY INJURY) (HCC): ICD-10-CM

## 2023-11-06 PROCEDURE — 99213 OFFICE O/P EST LOW 20 MIN: CPT | Performed by: INTERNAL MEDICINE

## 2023-11-06 NOTE — TELEPHONE ENCOUNTER
LS notified RN she will overbook the pt for VV at 11:45 AM today.      Spoke to pt and scheduled appt    Future Appointments   Date Time Provider Osorio Chiang   11/6/2023 11:45 AM Marty Chi MD [facility] [place]   11/20/2023  9:20 AM Raymond Nevarez MD [facility] [place]   12/1/2023  9:30 AM Marty Chi MD [facility] [place]   10/11/2024  8:30 AM DOTTIE Mills  [facility]m [facility] [place]

## 2023-11-06 NOTE — PROGRESS NOTES
This is a telemedicine visit with live, interactive video and audio. Patient understands and accepts financial responsibility for any deductible, co-insurance and/or co-pays associated with this service. SUBJECTIVE  He felt sick starting on Friday morning and felt very weak so went to ER. Diagnosed with COVID infection. He had a lot of diarrhea, which is now resolved. He notes he didn't feel well enough to eat until yesterday. Today his generalized weakness and other symptoms are improved. He denies fever. He feels a little short of breath and has been taking albuterol a few times a day which helps. He has previously taken Trelegy for chronic bronchitis symptoms       HISTORY:  Past Medical History:   Diagnosis Date    Abdominal hernia 1973 and 2001    ALCOHOL USE     1-2 times/wk    Arthritis     Back pain 2016    Bronchitis 03/2023    Chronic left-sided low back pain     Diabetes ([address]     diet controlled - dx 2/2020    Easy bruising 2019    Elevated lipase 11/25/2015    Flatulence/gas pain/belching Randomly    Gout     Hernia, abdominal 1973    1973 hernia repair.  2001 double hernia repair    Left knee pain     Obesity     Pacemaker 02/2018    Pneumonia due to organism Apr 2023    Pulmonary emphysema [facility]) Mother    Wears glasses Reading glasses only      Past Surgical History:   Procedure Laterality Date    CARDIAC PACEMAKER PLACEMENT  2018    CATARACT      COLONOSCOPY  2014    COLONOSCOPY N/A 10/08/2021    Procedure: COLONOSCOPY with cold snare polypectomy;  Surgeon: Everardo Wallis MD;  Location: [facility] ENDOSCOPY    DENTAL SURGERY PROCEDURE  2009    dental implants    HERNIA SURGERY Right 1973    HERNIA SURGERY Bilateral 2001    LASIK Bilateral 2003    OTHER SURGICAL HISTORY      Dental Implants 6 years ago    PACEMAKER/DEFIBRILLATOR      2017    REPAIR ING HERNIA,5+Y/O,REDUCIBL      REPAIR OF NASAL SEPTUM N/A 2003    TONSILLECTOMY  1950's    VASECTOMY  1978      Family History   Problem Relation Age of Onset    Other (Other) Father         dementia    Other (Other) Mother         COPD    Asthma Mother     Pulmonary Disease Mother       Social History     Socioeconomic History    Marital status:    Tobacco Use    Smoking status: Former     Packs/day: 1.00     Years: 52.00     Additional pack years: 0.00     Total pack years: 52.00     Types: Cigarettes     Start date: 1965     Quit date: 10/30/2017     Years since quittin.0     Passive exposure: Past (pt's parents both smoked in the home)    Smokeless tobacco: Never   Vaping Use    Vaping Use: Never used   Substance and Sexual Activity    Alcohol use: Not Currently     Alcohol/week: 1.0 standard drink of alcohol     Types: 1 Standard drinks or equivalent per week     Comment: 1/2 drink weekly     Drug use: Never        No Known Allergies   Current Outpatient Medications   Medication Sig Dispense Refill    fluconazole 100 MG Oral Tab Take 1 tablet (100 mg total) by mouth daily. 7 tablet 0    clotrimazole 10 MG Mouth/Throat Jaleel Take 1 lozenge (10 mg total) by mouth 5 (five) times daily. 70 lozenge 0    fluticasone-umeclidin-vilant (TRELEGY ELLIPTA) 100-62.5-25 MCG/ACT Inhalation Aerosol Powder, Breath Activated Inhale 1 puff into the lungs daily. (Patient not taking: Reported on 2023) 1 each 5    Glucose Blood (CONTOUR NEXT TEST) In Vitro Strip TEST ONCE DAILY 100 strip 3    Microlet Lancets Does not apply Misc Test once daily. 100 each 3    rosuvastatin 10 MG Oral Tab Take 1 tablet (10 mg total) by mouth nightly. 90 tablet 3    Misc Natural Products (GLUCOSAMINE CHONDROITIN ADV) Oral Tab Take 1 tablet by mouth daily. latanoprost 0.005 % Ophthalmic Solution Place 1 drop into both eyes nightly. Multiple Vitamins-Minerals (PRESERVISION AREDS 2) Oral Cap take two tablets by mouth daily         OBJECTIVE  Physical Exam:   No acute distress. Alert and oriented. Conversant in complete sentences. No increased work of breathing.     ASSESSMENT & PLAN  Diagnoses and all orders for this visit:    COVID-19 virus infection - we discussed since his symptoms are improved now at day 3 of illness, no need for paxlovid at this time. Let me know if develop fever or worsening of symptoms. MELIA (acute kidney injury) ([facility] [address] -Cr elevated at 1.38, up from baseline of 1.18. Related to poor po intake since illness started. Increase water/hydration and will repeat blood work in a few weeks prior to next appt   -     Basic Metabolic Panel (8); Future    Type 2 diabetes mellitus without complication, without long-term current use of insulin ([facility]) - check A1c prior to next appointment.  He denies hypoglycemia and states glu numbers have been normal   -     Hemoglobin A1C; Future      Mindy Rosario MD

## 2023-11-06 NOTE — TELEPHONE ENCOUNTER
LS - pt wants your recommendation, please advise! Ty! RN spoke to [address], okay to offer 10:30 vv for Paxlovid. Spoke to pt, he is feeling better but reports weakness, has to sit down when tires easily. Pt declined to schedule vv until he gets Dr. Dulce Carlson opinion. RN to call pt back.

## 2023-11-06 NOTE — TELEPHONE ENCOUNTER
From: Ji Beauchamp  To: Davidson Drake  Sent: 11/4/2023 12:53 PM CDT  Subject: I HAVECOVID    I went to the immediate care facility in [place] on Friday because I couldn't stand without assistance. I also experienced fatigue and I had some breating issues. I tested positive for COVID and they sent me home. Since then I have experienced several diarrhea episodes and I haven't eaten since Thursday night. Is Ignacio Shaikh a possible remedy or do you recommend and alternative?

## 2023-11-20 ENCOUNTER — OFFICE VISIT (OUTPATIENT)
Dept: ORTHOPEDICS CLINIC | Facility: CLINIC | Age: 73
End: 2023-11-20
Payer: MEDICARE

## 2023-11-20 VITALS — HEIGHT: 67.5 IN | WEIGHT: 215.63 LBS | BODY MASS INDEX: 33.45 KG/M2

## 2023-11-20 DIAGNOSIS — M25.552 LEFT HIP PAIN: Primary | ICD-10-CM

## 2023-11-20 DIAGNOSIS — M16.12 PRIMARY OSTEOARTHRITIS OF LEFT HIP: ICD-10-CM

## 2023-11-20 PROCEDURE — 99203 OFFICE O/P NEW LOW 30 MIN: CPT | Performed by: ORTHOPAEDIC SURGERY

## 2023-11-21 ENCOUNTER — LAB ENCOUNTER (OUTPATIENT)
Dept: LAB | Age: 73
End: 2023-11-21
Attending: INTERNAL MEDICINE
Payer: MEDICARE

## 2023-11-21 DIAGNOSIS — N17.9 AKI (ACUTE KIDNEY INJURY) (HCC): ICD-10-CM

## 2023-11-21 DIAGNOSIS — E11.9 TYPE 2 DIABETES MELLITUS WITHOUT COMPLICATION, WITHOUT LONG-TERM CURRENT USE OF INSULIN (HCC): ICD-10-CM

## 2023-11-21 LAB
ANION GAP SERPL CALC-SCNC: 5 MMOL/L (ref 0–18)
BUN BLD-MCNC: 16 MG/DL (ref 9–23)
CALCIUM BLD-MCNC: 8.4 MG/DL (ref 8.5–10.1)
CHLORIDE SERPL-SCNC: 109 MMOL/L (ref 98–112)
CO2 SERPL-SCNC: 27 MMOL/L (ref 21–32)
CREAT BLD-MCNC: 1.37 MG/DL
EGFRCR SERPLBLD CKD-EPI 2021: 54 ML/MIN/1.73M2 (ref 60–?)
EST. AVERAGE GLUCOSE BLD GHB EST-MCNC: 140 MG/DL (ref 68–126)
FASTING STATUS PATIENT QL REPORTED: YES
GLUCOSE BLD-MCNC: 93 MG/DL (ref 70–99)
HBA1C MFR BLD: 6.5 % (ref ?–5.7)
OSMOLALITY SERPL CALC.SUM OF ELEC: 293 MOSM/KG (ref 275–295)
POTASSIUM SERPL-SCNC: 4.2 MMOL/L (ref 3.5–5.1)
SODIUM SERPL-SCNC: 141 MMOL/L (ref 136–145)

## 2023-11-21 PROCEDURE — 36415 COLL VENOUS BLD VENIPUNCTURE: CPT

## 2023-11-21 PROCEDURE — 80048 BASIC METABOLIC PNL TOTAL CA: CPT

## 2023-11-21 PROCEDURE — 83036 HEMOGLOBIN GLYCOSYLATED A1C: CPT

## 2023-12-01 ENCOUNTER — OFFICE VISIT (OUTPATIENT)
Dept: INTERNAL MEDICINE CLINIC | Facility: CLINIC | Age: 73
End: 2023-12-01
Payer: MEDICARE

## 2023-12-01 VITALS
WEIGHT: 212.38 LBS | HEART RATE: 80 BPM | BODY MASS INDEX: 33.33 KG/M2 | TEMPERATURE: 97 F | DIASTOLIC BLOOD PRESSURE: 54 MMHG | RESPIRATION RATE: 16 BRPM | HEIGHT: 67 IN | OXYGEN SATURATION: 98 % | SYSTOLIC BLOOD PRESSURE: 110 MMHG

## 2023-12-01 DIAGNOSIS — M79.672 PAIN IN BOTH FEET: ICD-10-CM

## 2023-12-01 DIAGNOSIS — Z00.00 WELLNESS EXAMINATION: Primary | ICD-10-CM

## 2023-12-01 DIAGNOSIS — M79.671 PAIN IN BOTH FEET: ICD-10-CM

## 2023-12-01 DIAGNOSIS — E11.9 DIET-CONTROLLED TYPE 2 DIABETES MELLITUS (HCC): ICD-10-CM

## 2023-12-01 DIAGNOSIS — I65.23 BILATERAL CAROTID ARTERY STENOSIS: ICD-10-CM

## 2023-12-01 DIAGNOSIS — I73.9 PVD (PERIPHERAL VASCULAR DISEASE) (HCC): ICD-10-CM

## 2023-12-01 DIAGNOSIS — R25.1 TREMOR: ICD-10-CM

## 2023-12-01 DIAGNOSIS — M25.552 LEFT HIP PAIN: ICD-10-CM

## 2023-12-01 DIAGNOSIS — N18.2 STAGE 2 CHRONIC KIDNEY DISEASE: ICD-10-CM

## 2023-12-01 PROCEDURE — 1125F AMNT PAIN NOTED PAIN PRSNT: CPT | Performed by: INTERNAL MEDICINE

## 2023-12-01 PROCEDURE — G0439 PPPS, SUBSEQ VISIT: HCPCS | Performed by: INTERNAL MEDICINE

## 2023-12-08 ENCOUNTER — TELEMEDICINE (OUTPATIENT)
Facility: CLINIC | Age: 73
End: 2023-12-08
Payer: MEDICARE

## 2023-12-08 DIAGNOSIS — J20.9 ACUTE BRONCHITIS, UNSPECIFIED ORGANISM: Primary | ICD-10-CM

## 2023-12-08 PROCEDURE — 99214 OFFICE O/P EST MOD 30 MIN: CPT | Performed by: NURSE PRACTITIONER

## 2023-12-08 RX ORDER — FLUTICASONE FUROATE, UMECLIDINIUM BROMIDE AND VILANTEROL TRIFENATATE 100; 62.5; 25 UG/1; UG/1; UG/1
1 POWDER RESPIRATORY (INHALATION) DAILY
Qty: 1 EACH | Refills: 5 | Status: SHIPPED | OUTPATIENT
Start: 2023-12-08

## 2023-12-08 RX ORDER — AZITHROMYCIN 250 MG/1
TABLET, FILM COATED ORAL
Qty: 6 TABLET | Refills: 0 | Status: SHIPPED | OUTPATIENT
Start: 2023-12-08

## 2023-12-08 RX ORDER — PREDNISONE 10 MG/1
TABLET ORAL
Qty: 30 TABLET | Refills: 0 | Status: SHIPPED | OUTPATIENT
Start: 2023-12-08

## 2023-12-08 NOTE — PATIENT INSTRUCTIONS
Recommend steroids and a zpak and followup in 2 weeks.    Restart trelegy; new rx sent to pharmacy; rinse your mouth after using  Please contact office at [phone] with any decline in respiratory status, questions or concerns

## 2023-12-08 NOTE — PROGRESS NOTES
[facility] General Pulmonary Progress Note    History of Present Illness:  Tiffany Nunez is a 74year old male with significant PMH bronchitis who presents today for sick visit. Reports having increase SOB and wheezing leading him to use Trelegy again. Felt a little better however unable to continue to use as inhaler open for > 6 weeks. No f/c/ns. Increased clearing his throat and phlegm. Increased wheezing last night; using albuterol with relief. Previously [id number]  a 74year old male, former smoker,  with significant PMH of bronchitis and sinusitis who presents today to review LDCT scan. Feels well. Thrush and hoarseness completely resolved, denies SOB off of inhalers. Previously 9/27/2023  a 74year old male who presents for follow - up. States the hoarseness has resolved. States no new issues. Denies cough or SOB. Previously 9/13/23  a 74year old male who presents for c/o hoarseness that he has noticed over the past month. On trelegy since May feels like he needs to clear his throat often and the hoarseness. Breathing is significantly better. Rinsing his mouth after each inhaler use. No f/c/ns. No s/sx of GERD. No rhinorhea or PND. Previous [id number]  a 74year old male who presents for follow - up. States the breathing is much better on the Trelegy. Reports sleeping through the night, no cough, SOB. Using nasal wash, flonase, Trelegy; has not needed albuterol inhlaer. Feels back to normal.     Previously Dr Gurmeet Dalton 5/23/23  a 74year old male former smoker (quit 2017, 50 pack years) with significant PMH of DM, HTN, s/p PPM  who presents today for evaluation of dyspnea. He reports having been well until around March 2023 after awakening with dyspnea, cough and wheeze. He was seen/managed in ER treated for bronchospasm, possible pneumonia and given prednisone, albuterol and zpak.   He did have some improvement, however his symptoms worsened and he was evaluated again in ER in April x 2 and again in early may 2023. He now presents today for further evaluation. He admits the albuterol helps but only lasts about 2 hours, then needs to take it again. He has had ongoing wheezing for the past few months. He has had ongoing sinus congestion/drainage leading to frequent cough and throat clearing with clear phlegm. No blood. No fevers. No chest pain/pressure. Has had bronchitis episodes in the past several years, but never diagnosed with asthma or COPD. Has noted sneezing when in his office at home, not aware of any new furniture, home decor, no renovations or changes. Retired, worked as , not aware of any significant exposures. Past Medical History:   Past Medical History:   Diagnosis Date    Abdominal hernia 1973 and 2001    ALCOHOL USE     1-2 times/wk    Arthritis     Back pain 2016    Bronchitis 03/2023    Chronic left-sided low back pain     Diabetes ([address]     diet controlled - dx 2/2020    Easy bruising 2019    Elevated lipase 11/25/2015    Flatulence/gas pain/belching Randomly    Gout     Hernia, abdominal 1973    1973 hernia repair.  2001 double hernia repair    Left knee pain     Obesity     Pacemaker 02/2018    Pneumonia due to organism Apr 2023    Pulmonary emphysema [facility]) Mother    Wears glasses Reading glasses only        Past Surgical History:   Past Surgical History:   Procedure Laterality Date    CARDIAC PACEMAKER PLACEMENT  2018    CATARACT      COLONOSCOPY  2014    COLONOSCOPY N/A 10/08/2021    Procedure: COLONOSCOPY with cold snare polypectomy;  Surgeon: Lacy Cano MD;  Location: [facility] ENDOSCOPY    DENTAL SURGERY PROCEDURE  2009    dental implants    HERNIA SURGERY Right 1973    HERNIA SURGERY Bilateral 2001    LASIK Bilateral 2003    OTHER SURGICAL HISTORY      Dental Implants 6 years ago    PACEMAKER/DEFIBRILLATOR      2017    REPAIR ING HERNIA,5+Y/O,REDUCIBL      REPAIR OF NASAL SEPTUM N/A 2003    TONSILLECTOMY  1950's    VASECTOMY  1978         Family Medical History: Family History   Problem Relation Age of Onset    Other (Other) Father         dementia    Other (Other) Mother         COPD    Asthma Mother     Pulmonary Disease Mother         Social History:   Social History     Socioeconomic History    Marital status:      Spouse name: Not on file    Number of children: Not on file    Years of education: Not on file    Highest education level: Not on file   Occupational History    Not on file   Tobacco Use    Smoking status: Former     Packs/day: 1.00     Years: 50.00     Additional pack years: 0.00     Total pack years: 50.00     Types: Cigarettes     Start date: 1965     Quit date: 10/30/2017     Years since quittin.1     Passive exposure: Past (pt's parents both smoked in the home)    Smokeless tobacco: Never   Vaping Use    Vaping Use: Never used   Substance and Sexual Activity    Alcohol use: Yes     Alcohol/week: 1.0 standard drink of alcohol     Types: 1 Standard drinks or equivalent per week     Comment: 1 drink weekly    Drug use: Never    Sexual activity: Not on file   Other Topics Concern    Caffeine Concern No    Exercise No    Seat Belt No    Special Diet No    Stress Concern No    Weight Concern No   Social History Narrative    Not on file     Social Determinants of Health     Financial Resource Strain: Not on file   Food Insecurity: Not on file   Transportation Needs: Not on file   Physical Activity: Not on file   Stress: Not on file   Social Connections: Not on file   Housing Stability: Not on file        Medications:   Current Outpatient Medications   Medication Sig Dispense Refill    fluticasone-umeclidin-vilant (TRELEGY ELLIPTA) 100-62.5-25 MCG/ACT Inhalation Aerosol Powder, Breath Activated Inhale 1 puff into the lungs daily. (Patient not taking: Reported on 2023) 1 each 5    Glucose Blood (CONTOUR NEXT TEST) In Vitro Strip TEST ONCE DAILY 100 strip 3    Microlet Lancets Does not apply Misc Test once daily.  100 each 3    rosuvastatin 10 MG Oral Tab Take 1 tablet (10 mg total) by mouth nightly. 90 tablet 3    Misc Natural Products (GLUCOSAMINE CHONDROITIN ADV) Oral Tab Take 1 tablet by mouth daily. latanoprost 0.005 % Ophthalmic Solution Place 1 drop into both eyes nightly. Multiple Vitamins-Minerals (PRESERVISION AREDS 2) Oral Cap take two tablets by mouth daily         Review of Systems: Review of Systems   Constitutional:  Positive for fatigue. Negative for activity change, appetite change, chills, diaphoresis, fever and unexpected weight change. HENT:  Negative for congestion, postnasal drip, rhinorrhea, sinus pressure, sinus pain, sneezing, sore throat, trouble swallowing and voice change. Respiratory:  Positive for cough, shortness of breath and wheezing. Negative for apnea, choking, chest tightness and stridor. Cardiovascular:  Negative for chest pain, palpitations and leg swelling. Musculoskeletal:  Negative for arthralgias. Skin:  Negative for pallor and rash. Allergic/Immunologic: Negative for immunocompromised state. Neurological:  Negative for dizziness and headaches. Hematological:  Negative for adenopathy. Physical Exam:  There were no vitals taken for this visit. Constitutional: alert, cooperative. No acute distress. HEENT: Head NC/AT. Respiratory: Thorax symmetrical with no labored breathing. Neurologic: A&Ox3. No gross motor deficits. Skin:  dry  Psych: Calm, cooperative. Pleasant affect. Results:  Personally reviewed    WBC: 8.8, done on 11/3/2023. HGB: 13.8, done on 11/3/2023. PLT: 170, done on 11/3/2023. Glucose: 93, done on 11/21/2023. Cr: 1.37, done on 11/21/2023. GFR(AA): 62, done on 7/7/2022. GFR (non-AA): 54, done on 7/7/2022. CA: 8.4, done on 11/21/2023. Na: 141, done on 11/21/2023. K: 4.2, done on 11/21/2023. Cl: 109, done on 11/21/2023. CO2: 27, done on 11/21/2023. Last ALB was 3.7% done on 11/3/2023.      XR CHEST AP PORTABLE  (CPT=71045)    Result Date: 11/3/2023  CONCLUSION:  No evidence of active cardiopulmonary disease. LOCATION:  [facility]      Dictated by (CST): Gail Gusman MD on 11/03/2023 at 1:55 PM     Finalized by (CST): Gail Gusman MD on 11/03/2023 at 1:56 PM       CT LUNG LD SCREENING(CPT=71271)    Result Date: 10/5/2023  CONCLUSION:  1. Lung-RADS Category  1:  Negative. No evidence of primary lung cancer. 2. Lung-RADS Category S:    Negative. 3. Other incidental findings:  None   RECOMMENDATIONS:  LUNG-RADS 1: Continued routine annual LDCT lung screening. Suggest next exam on or around 10/6/2024. CT Lung Screening Reporting and Data System (Lung-RADS 1.1)    Lung Cancer Specific Category   ACR -Guidelines Based Follow-up   Category    Assessment                  Recommendation   0  Incomplete  Further imaging recommended    1  Negative  Routine annual LDCT screen (age [de-identified])   2  Benign appearance or behavior  Routine annual LDCT screen (age [de-identified])   3  Probably benign  Interval short-term diagnostic LDCT (6 months)   4a  Suspicious  Short-term follow-up LDCT or PET/CT (3 months)   4b  Suspicious  Multidisciplinary Conference Review   4x  Suspicious  Category 3 or 4 nodules with other suspicious features   S  Other potentially significant findings  Follow-up based on abnormality  LungRAD scores of 3, 4A, and 4B will be reviewed in the Multidisciplinary Thoracic Oncology Clinic      LOCATION:  [place]    Dictated by (CST): Rosy Dennison MD on 10/05/2023 at 12:59 PM     Finalized by (CST): Rosy Dennison MD on 10/05/2023 at 1:13 PM         Assessment/Plan:  #1. Acute/chronic bronchitis  Suspecting underlying allergies given noted eosinophilia on CBC, could be post infectious as well. May have underlying COPD.  Also has contributor of coughing related to sinus drainage  Treat rhinosinusitis as below  5/2023 PFTs with no obstruction but +air trapping and mildly reduced DLCO 64%  Start brief prednisone regimen  Start trelegy 200 daily, use albuterol PRN; plan to deescalate inhaler once improved  6/2023 will continue Trelegy 200 for the next 1-2 weeks and transition to Trelegy 100 once air quality improves. May use TRAY prn SOB. Pt aware to call office if increased SOB. Will followup in 3 months and determine next steps; pt would like to wean off inhaler if possible  9/2023 Breathing is significantly improved. Will stop Trelegy due to thrush and pt request to stop inhaler. CPFT showed no obstruction. Will evaluate in 2 weeks at followup  9/27/23 Stable; denies c/o SOB, HERNANDEZ. Will remain off inhalers and call office if needed  10/2023 Stable; no complaints off inhalers  12/2023 s/sx c/w exacerbation. Recommend steroids and a zpak and followup in 2 weeks. Will also restart trelegy; new rx sent to pharmacy     #2. Chronic rhinosinusitis  Seen/following with ENT, advised nasal rinses and nasal steroids  6/2023 continue saline nasal wash and flonase as directed  9/2023 Using nasal spray prn; denies complaints  10/2023 denies complaints     #3. Tobacco abuse  Former smoker, 50 pack years, quit 2017  Had last LDCT August 2022 - lung rads 2, plan next CT August 2023  10/2023 LDCT scan Lung Rads 1-no nodules noted  Next LDCT scan due in 10/2024  Lung Cancer Counseling and Shared Decision Making Session in an Asymptomatic Smoker/Former Smoker   Usman Vo is a 74year old male without current symptoms of lung cancer. History   Smoking Status    Former    Packs/day: 1.00    Years: 50.00    Types: Cigarettes    Start date: 1/1/1965    Quit date: 10/30/2017   Smokeless Tobacco    Never        He received information on the importance of adherence to annual lung cancer Low Dose CT (LDCT) screening, the impact of his comorbidities and his ability or willingness to undergo diagnosis and treatment. he is in agreement and an order will be placed for CT LUNG LD SCREENING(CPT=71271).     We counseled the importance of maintaining cigarette smoking abstinence if he is a former smoker and the importance of smoking cessation if he is a current smoker and we discussed and furnished information about tobacco cessation interventions. #4Alvenia Jeimy  Will stop Trelegy and start mycelex troches  Will followup in 2 weeks  9/27/23 Almost completely resolved. Will give 1 week of fluconazole and followup in 2 weeks for followup  10/2023 Resolved     #5. Hoarseness  Likely r/t thrush from Trelegy  Will monitor and followup in 2 weeks  9/27/23 Resolved         DOTTIE Guaman  [facility]   12/8/2023    This visit is conducted using Telemedicine with live, interactive video and audio. Patient has been referred to the [facility] website at [email or web address] to review the yearly Consent to Treat document. Patient understands and accepts financial responsibility for any deductible, co-insurance and/or co-pays associated with this service.

## 2023-12-12 ENCOUNTER — OFFICE VISIT (OUTPATIENT)
Dept: PHYSICAL THERAPY | Age: 73
End: 2023-12-12
Attending: ORTHOPAEDIC SURGERY
Payer: MEDICARE

## 2023-12-12 DIAGNOSIS — M25.552 LEFT HIP PAIN: Primary | ICD-10-CM

## 2023-12-12 DIAGNOSIS — M16.12 PRIMARY OSTEOARTHRITIS OF LEFT HIP: ICD-10-CM

## 2023-12-12 PROCEDURE — 97162 PT EVAL MOD COMPLEX 30 MIN: CPT

## 2023-12-12 PROCEDURE — 97110 THERAPEUTIC EXERCISES: CPT

## 2023-12-14 ENCOUNTER — OFFICE VISIT (OUTPATIENT)
Dept: PHYSICAL THERAPY | Age: 73
End: 2023-12-14
Attending: ORTHOPAEDIC SURGERY
Payer: MEDICARE

## 2023-12-14 PROCEDURE — 97110 THERAPEUTIC EXERCISES: CPT

## 2023-12-14 NOTE — PROGRESS NOTES
Diagnosis:   Left hip pain (M25.552)  Primary osteoarthritis of left hip (M16.12)     Insurance (Authorized # of Visits):  Medicare          Authorizing Physician: Dr. Clarisa Diehl Next MD visit: none scheduled  Fall Risk: standard         Precautions: n/a             Subjective: Patient states he is feeling great. Patient state that he is unsure if the Prednisone is helping also. Had no issues putting on shoes and pants. Pain rating 0/10. Objective:     Date: 12/14/23   TX#: 2/10   TherEx:   NuStep level 4 x 6 minutes  Calf stretch on board 3 x 30 seconds   Standing lumbar extension over fulcrum x 10  Standing hip flexor stretch 3 x 30 seconds  Supine hip ER 3 x 30 seconds  Lower trunk rotation 2 x 10  Bridges x 20  Prone elbows x 3 minutes   Supine hip flexor stretch with overpressure x 1 minute   HEP: Hip flexor stretch in supine; standing lumbar extension over fulcrum ( 3 times a day); prone on elbows x 3 minutes     Assessment: Patient is responding to extension based exercises. Patient to continue with lower extremity and lumbar mobility exercises to improve range of motion. Goals:   Pt will have improved hip AROM to be able to don/doff shoes and perform car transfers without difficulty   Pt will be able to squat to  light objects around the house with <1/10 hip pain   Pt will improve functional hip strength to report ability to ascend/descend 1 flight of stairs reciprocally without use of handrail   Pt will be independent and compliant with comprehensive HEP to maintain progress achieved in PT     Plan: Re-assess next visit and continue with L hip, postural correction, HEP progression, patient education, and postural stability and strengthening exercises.       Charges: 3TE       Total Timed Treatment: 45 min  Total Treatment Time: 45 min

## 2023-12-19 ENCOUNTER — APPOINTMENT (OUTPATIENT)
Dept: PHYSICAL THERAPY | Age: 73
End: 2023-12-19
Attending: ORTHOPAEDIC SURGERY
Payer: MEDICARE

## 2023-12-21 ENCOUNTER — APPOINTMENT (OUTPATIENT)
Dept: PHYSICAL THERAPY | Age: 73
End: 2023-12-21
Attending: ORTHOPAEDIC SURGERY
Payer: MEDICARE

## 2023-12-26 ENCOUNTER — OFFICE VISIT (OUTPATIENT)
Dept: PHYSICAL THERAPY | Age: 73
End: 2023-12-26
Attending: ORTHOPAEDIC SURGERY
Payer: MEDICARE

## 2023-12-26 PROCEDURE — 97110 THERAPEUTIC EXERCISES: CPT

## 2023-12-26 NOTE — PROGRESS NOTES
Diagnosis:   Left hip pain (M25.552)  Primary osteoarthritis of left hip (M16.12)     Insurance (Authorized # of Visits):  Medicare          Authorizing Physician: Dr. Lennox Lima Next MD visit: none scheduled  Fall Risk: standard         Precautions: n/a             Subjective: Patient stats he continues to feel great in hip left hip and has not had any pain. Objective:     Date: 12/14/23   TX#: 2/10 Date 12/26/23   Tx 3/10   TherEx:   NuStep level 4 x 6 minutes  Calf stretch on board 3 x 30 seconds   Standing lumbar extension over fulcrum x 10  Standing hip flexor stretch 3 x 30 seconds  Supine hip ER 3 x 30 seconds  Lower trunk rotation 2 x 10  Bridges x 20  Prone elbows x 3 minutes   Supine hip flexor stretch with overpressure x 1 minute TherEx:   NuStep level 4 x 6 minutes  Calf stretch on board 3 x 30 seconds   Standing lumbar extension over fulcrum x 10  Supine hip ER 3 x 30 seconds  Lower trunk rotation 2 x 10  Bridges x 20  Prone elbows x 3 minutes   Supine hip flexor stretch with overpressure x 1 minute  Step up 6inch step x 20 B  Lateral step up 4inch step x 10 B   HEP: Hip flexor stretch in supine; standing lumbar extension over fulcrum ( 3 times a day); prone on elbows x 3 minutes     Assessment: Initiated step ups to improve functional strength and mobility. Patient is responding to extension based exercises. Patient to continue with lower extremity and lumbar mobility exercises to improve range of motion. Goals:   Pt will have improved hip AROM to be able to don/doff shoes and perform car transfers without difficulty   Pt will be able to squat to  light objects around the house with <1/10 hip pain   Pt will improve functional hip strength to report ability to ascend/descend 1 flight of stairs reciprocally without use of handrail   Pt will be independent and compliant with comprehensive HEP to maintain progress achieved in PT     Plan: Likely discharge next visit.        Charges: 3TE Total Timed Treatment: 45 min  Total Treatment Time: 45 min

## 2023-12-28 ENCOUNTER — OFFICE VISIT (OUTPATIENT)
Dept: PHYSICAL THERAPY | Age: 73
End: 2023-12-28
Attending: ORTHOPAEDIC SURGERY
Payer: MEDICARE

## 2023-12-28 PROCEDURE — 97110 THERAPEUTIC EXERCISES: CPT

## 2023-12-28 NOTE — PROGRESS NOTES
PROGRESS NOTE    Diagnosis:   Left hip pain (M25.552)  Primary osteoarthritis of left hip (M16.12)     Insurance (Authorized # of Visits):  Medicare          Authorizing Physician: Dr. Mendez Jack MD visit: none scheduled  Fall Risk: standard         Precautions: n/a             Subjective: Patient stats he continues to feel great in hip left hip and has not had any pain and just had some stiffness yesterday. Did not have to take anything for sleep. 0/10 pain  Assessment: Abilio ca attended 4 physical therapy sessions and has progressed well. Patient states his symptoms have resolved and he does not have pain in low back and left hip anymore. Patient states he is able dress himself, walk and get in/out of the car without pain. Patient has met his goals. If patient symptoms return than he may return to therapy in the next 2 month under the same order.    Objective:     LEFS Score  LEFS Score: 42.5 % (7/27/2023 11:06 AM)  Post LEFS Score  Post LEFS Score: 77.5 % (12/28/2023 12:36 PM)    35 % improvement      Date: 12/14/23   TX#: 2/10 Date 12/26/23   Tx 3/10 Date 12/28/23   Tx 4/10   TherEx:   NuStep level 4 x 6 minutes  Calf stretch on board 3 x 30 seconds   Standing lumbar extension over fulcrum x 10  Standing hip flexor stretch 3 x 30 seconds  Supine hip ER 3 x 30 seconds  Lower trunk rotation 2 x 10  Bridges x 20  Prone elbows x 3 minutes   Supine hip flexor stretch with overpressure x 1 minute TherEx:   NuStep level 4 x 6 minutes  Calf stretch on board 3 x 30 seconds   Standing lumbar extension over fulcrum x 10  Supine hip ER 3 x 30 seconds  Lower trunk rotation 2 x 10  Bridges x 20  Prone elbows x 3 minutes   Supine hip flexor stretch with overpressure x 1 minute  Step up 6inch step x 20 B  Lateral step up 4inch step x 10 B TherEx:   NuStep level 4 x 6 minutes  Calf stretch on board 3 x 30 seconds   Standing lumbar extension over fulcrum x 10  Supine hip ER stretch 3 x 30 seconds  Lower trunk rotation 2 x 10  Bridges x 20  Prone elbows x 3 minutes   Supine hip flexor stretch with overpressure x 1 minute  Step up 6inch step x 20 B  Lateral step up 4inch step x 10 B   HEP: Hip flexor stretch in supine; standing lumbar extension over fulcrum ( 3 times a day); prone on elbows x 3 minutes           Goals:   Pt will have improved hip AROM to be able to don/doff shoes and perform car transfers without difficulty MET  Pt will be able to squat to  light objects around the house with <1/10 hip pain MET  Pt will improve functional hip strength to report ability to ascend/descend 1 flight of stairs reciprocally without use of handrail MET  Pt will be independent and compliant with comprehensive HEP to maintain progress achieved in PT MET    Plan: Patient will be independent with home exercise program. If patient symptoms return than patient may return to physical therapy under the same order in the next 2 months.    Charges: 3TE       Total Timed Treatment: 45 min  Total Treatment Time: 45 min

## 2024-01-11 ENCOUNTER — PATIENT MESSAGE (OUTPATIENT)
Facility: CLINIC | Age: 74
End: 2024-01-11

## 2024-01-11 NOTE — TELEPHONE ENCOUNTER
Pt returned call and alternatives for Trelegy were as expensive as Trelegy.  He found out that he will need to meet the $500 deductible and then his copay for Trelegy will be $47.  Pt will continue use of the Trelegy and pay the deductible.  He will run out early next week but should receive the Trelegy by Tuesday afternoon.  Pt instructed to use albuterol for any coughing, wheezing or sob and to go to UC/ER if any decline in respiratory status.  Pt verbalizes understanding.

## 2024-01-11 NOTE — TELEPHONE ENCOUNTER
Called patient and informed him to call insurance company and see what medication preference/alternative they have.  I then explained to the patient to call us back and let us know what insurance company prefers in place of trelegy.   Pt verbalized understanding and will call insurance and then call us.

## 2024-01-11 NOTE — TELEPHONE ENCOUNTER
From: Nasir Beauchamp  To: Jenny Haywood  Sent: 1/11/2024 10:26 AM CST  Subject: TRELEGY ALTERNATIVE    Daryl Alvarado,  I have been doing very well since I went back on Trelegy a month ago. Back on August 3rd and the 25th I was paying $35 each. When I renewed on December 8th, my cost went up to $146.35. This morning I was told the cost has risen to $592.00 (YIMICAH) or 17 times what I was paying in August and 4 times what I paid a month ago.    Is there a more affordable alternative to the Trelegy?

## 2024-01-16 ENCOUNTER — MED REC SCAN ONLY (OUTPATIENT)
Facility: CLINIC | Age: 74
End: 2024-01-16

## 2024-01-29 ENCOUNTER — PATIENT MESSAGE (OUTPATIENT)
Dept: INTERNAL MEDICINE CLINIC | Facility: CLINIC | Age: 74
End: 2024-01-29

## 2024-01-29 DIAGNOSIS — M79.646 THUMB PAIN, UNSPECIFIED LATERALITY: Primary | ICD-10-CM

## 2024-01-29 NOTE — TELEPHONE ENCOUNTER
LS - ran CE update. KATHARINE Mcduffie was with [facility], but she comes up as now being at [facility].     I do not see that pt saw you for this issue. Would you like an appt scheduled first? Please advise, ty!

## 2024-01-29 NOTE — TELEPHONE ENCOUNTER
From: Nasir Beauchamp  To: Staci Miguel  Sent: 1/29/2024 8:47 AM CST  Subject: PAIN IN RIGHT THUMB    Back in July, 2022, I saw PA Ivelisse Orozco regarding the pain in my right thumb. I received a cortisone injection and that seemed to solve the problem, at least temporarily.    Well it's now 18 months later and Harry having the same issue. I'm not sure who Ivelisse was a PA for and I'm not sure if she is still in the group.    Do I need a referral from your office? I am not sure if they will need an x-ray or if an alternative treatment should be considered.    What is your thoughts on this?

## 2024-01-30 ENCOUNTER — TELEPHONE (OUTPATIENT)
Dept: ORTHOPEDICS CLINIC | Facility: CLINIC | Age: 74
End: 2024-01-30

## 2024-01-30 NOTE — TELEPHONE ENCOUNTER
Pt made appt online for Pain in right thumb. Last seen on 07/25/22. Please advise if xrays are needed.     Thanks!     Future Appointments   Date Time Provider Department Center   2/21/2024  8:30 AM Jose Stanton MD [facility]   10/11/2024  8:30 AM Jenny Haywood APRN  [facility]

## 2024-02-21 ENCOUNTER — OFFICE VISIT (OUTPATIENT)
Dept: ORTHOPEDICS CLINIC | Facility: CLINIC | Age: 74
End: 2024-02-21
Payer: MEDICARE

## 2024-02-21 VITALS — WEIGHT: 212 LBS | BODY MASS INDEX: 33.27 KG/M2 | HEIGHT: 67 IN

## 2024-02-21 DIAGNOSIS — M18.11 PRIMARY OSTEOARTHRITIS OF FIRST CARPOMETACARPAL JOINT OF RIGHT HAND: Primary | ICD-10-CM

## 2024-02-21 PROCEDURE — 99213 OFFICE O/P EST LOW 20 MIN: CPT | Performed by: ORTHOPAEDIC SURGERY

## 2024-02-21 PROCEDURE — 20600 DRAIN/INJ JOINT/BURSA W/O US: CPT | Performed by: ORTHOPAEDIC SURGERY

## 2024-02-21 RX ORDER — CHLORHEXIDINE GLUCONATE ORAL RINSE 1.2 MG/ML
SOLUTION DENTAL
COMMUNITY
Start: 2024-01-23

## 2024-02-21 RX ORDER — BETAMETHASONE SODIUM PHOSPHATE AND BETAMETHASONE ACETATE 3; 3 MG/ML; MG/ML
6 INJECTION, SUSPENSION INTRA-ARTICULAR; INTRALESIONAL; INTRAMUSCULAR; SOFT TISSUE ONCE
Status: COMPLETED | OUTPATIENT
Start: 2024-02-21 | End: 2024-02-21

## 2024-02-21 RX ADMIN — BETAMETHASONE SODIUM PHOSPHATE AND BETAMETHASONE ACETATE 6 MG: 3; 3 INJECTION, SUSPENSION INTRA-ARTICULAR; INTRALESIONAL; INTRAMUSCULAR; SOFT TISSUE at 09:19:00

## 2024-02-21 NOTE — PROGRESS NOTES
Operative Report       Assessment/Plan:  73 year old male    Right thumb CMC joint arthritis- S/P 1 corticosteroid injection with recurrence in symptoms -  I discussed with the patient various treatment options and their success rate/risks. We used a shared decision-making process decided to proceed with an additional corticosteroid injection. Alternative nonsurgical options were discussed that are available in the future if corticosteroid injections no longer provide relief, as well as surgical options. We decided to proceed with an additional corticosteroid injection.     Injection: Written consent was obtained.  Skin was prepped with ChloraPrep.  1 mL of 6mg betamethasone and 1 mL of 1% lidocaine was injected into the right thumb CMC joint.  Patient tolerated the procedure well.  No complications were encountered.  Band-Aid was applied.    Follow Up: As needed    Diagnostic Studies:  X-rays of her right hand reveal evidence of CMC joint arthritis of the thumb.  No no acute fractures occasions performed    Physical Exam:    Ht 5' 7\" (1.702 m)   Wt 212 lb (96.2 kg)   BMI 33.20 kg/m²     Constitutional: NAD. AOx3. Well-developed and Well-nourished.   Psychiatric: Normal mood/ affect/ behavior. Judgment and thought content normal.     Right upper Extremity:   Inspection: Skin Intact. No skin lesions. No gross deformity.   Palpation:  Tender to palpation over the right CMC joint positive relocation test   Motion: Elbow: normal bilateral symmetric ext/flex  Wrist: normal bilateral symmetric ext/flex/sup/pro  Finger: full composite fist  Right MCP hyperextension of 20-25 degrees.        CC: Follow - Up (Right thumb)        HPI: This 73 year old male presents with complaints of pain to his right thumb.  He states has been ongoing for years but now over the last month it is getting much worse.  He rates it a 3 out of 10.  He wears a brace as needed.  He states he has pain with pinching gripping grasping activities.  He received a corticosteroid in the past with improvement in symptoms, with recurrence occurring 2-3 months ago.     Past Medical History:   Diagnosis Date    Abdominal hernia 1973 and 2001    ALCOHOL USE     1-2 times/wk    Arthritis     Back pain 2016    Bronchitis 03/2023    Chronic left-sided low back pain     Diabetes (HCC)     diet controlled - dx 2/2020    Easy bruising 2019    Elevated lipase 11/25/2015    Flatulence/gas pain/belching Randomly    Gout     Hernia, abdominal 1973    1973 hernia repair. 2001 double hernia repair    Left knee pain     Obesity     Pacemaker 02/2018    Pneumonia due to organism Apr 2023    Pulmonary emphysema (HCC) Mother    Wears glasses Reading glasses only     Past Surgical History:   Procedure Laterality Date    CARDIAC PACEMAKER PLACEMENT  2018    CATARACT      COLONOSCOPY  2014    COLONOSCOPY N/A 10/08/2021    Procedure: COLONOSCOPY with cold snare polypectomy;  Surgeon: Stewart Tolentino MD;  Location:  ENDOSCOPY    DENTAL SURGERY PROCEDURE  2009    dental implants    HERNIA SURGERY Right 1973    HERNIA SURGERY Bilateral 2001    LASIK Bilateral 2003    OTHER SURGICAL HISTORY      Dental Implants 6 years ago    PACEMAKER/DEFIBRILLATOR      2017    REPAIR ING HERNIA,5+Y/O,REDUCIBL      REPAIR OF NASAL SEPTUM N/A 2003    TONSILLECTOMY  1950's    VASECTOMY  1978     Current Outpatient Medications   Medication Sig Dispense Refill    chlorhexidine gluconate 0.12 % Mouth/Throat Solution RINSE AND SPIT 15ML BY MOUTH TWICE DAILY. SWISH AND SPIT      azithromycin 250 MG Oral Tab take 2 tablet (500MG)  by ORAL route  every day for 1 day then 1 tablet (250 mg) by oral route once daily for 4 days 6 tablet 0    fluticasone-umeclidin-vilant (TRELEGY ELLIPTA) 100-62.5-25 MCG/ACT Inhalation Aerosol Powder, Breath Activated Inhale 1 puff into the lungs daily. 1 each 5    Glucose Blood (CONTOUR NEXT TEST) In Vitro Strip TEST ONCE DAILY 100 strip 3    Microlet Lancets Does not apply Misc  Test once daily. 100 each 3    rosuvastatin 10 MG Oral Tab Take 1 tablet (10 mg total) by mouth nightly. 90 tablet 3    Misc Natural Products (GLUCOSAMINE CHONDROITIN ADV) Oral Tab Take 1 tablet by mouth daily.      latanoprost 0.005 % Ophthalmic Solution Place 1 drop into both eyes nightly.      Multiple Vitamins-Minerals (PRESERVISION AREDS 2) Oral Cap take two tablets by mouth daily      predniSONE 10 MG Oral Tab Take 4 tabs (40mg) PO for 3 days, then 3 tabs (30mg) daily for 3 days, then take 2 tabs (20mg) daily for 3 days, then take 1 tab (10mg) daily for 3 days. 30 tablet 0     No Known Allergies  Family History   Problem Relation Age of Onset    Other (Other) Father         dementia    Other (Other) Mother         COPD    Asthma Mother     Pulmonary Disease Mother      Social History     Occupational History    Not on file   Tobacco Use    Smoking status: Former     Packs/day: 1.00     Years: 50.00     Additional pack years: 0.00     Total pack years: 50.00     Types: Cigarettes     Start date: 1965     Quit date: 10/30/2017     Years since quittin.3     Passive exposure: Past (pt's parents both smoked in the home)    Smokeless tobacco: Never   Vaping Use    Vaping Use: Never used   Substance and Sexual Activity    Alcohol use: Yes     Alcohol/week: 1.0 standard drink of alcohol     Types: 1 Standard drinks or equivalent per week     Comment: 1 drink weekly    Drug use: Never    Sexual activity: Not on file        Review of Systems (negative unless bolded):  General: fevers, chills, fatigue  CV:  chest pain, palpitations, leg swelling  Msk: bodyaches, neck pain, neck stiffness  Skin: rashes, open wounds, nonhealing ulcers  Hem: bleeds easily, bruise easily, immunocompromised  Neuro: dizziness, light headedness, headaches  Psych: anxious, depressed, anger issues    Attention: This note has been scribed by Rema Asif under the supervision of Jose Stanton MD.     Jose Stanton MD   Hand, Wrist, &  Elbow Surgery  [email or web address]  t: [phone]  f: [phone]

## 2024-04-09 ENCOUNTER — PATIENT MESSAGE (OUTPATIENT)
Dept: INTERNAL MEDICINE CLINIC | Facility: CLINIC | Age: 74
End: 2024-04-09

## 2024-04-09 DIAGNOSIS — Z78.9 POOR TOLERANCE FOR AMBULATION: ICD-10-CM

## 2024-04-09 DIAGNOSIS — M54.50 CHRONIC LEFT-SIDED LOW BACK PAIN, UNSPECIFIED WHETHER SCIATICA PRESENT: Primary | ICD-10-CM

## 2024-04-09 DIAGNOSIS — G89.29 CHRONIC LEFT-SIDED LOW BACK PAIN, UNSPECIFIED WHETHER SCIATICA PRESENT: Primary | ICD-10-CM

## 2024-04-09 DIAGNOSIS — M25.552 BILATERAL HIP PAIN: ICD-10-CM

## 2024-04-09 DIAGNOSIS — M19.90 ARTHRITIS: ICD-10-CM

## 2024-04-09 DIAGNOSIS — M25.551 BILATERAL HIP PAIN: ICD-10-CM

## 2024-04-10 NOTE — TELEPHONE ENCOUNTER
From: Nasir Beauchamp  To: Staci Miguel  Sent: 4/9/2024 6:18 PM CDT  Subject: LOWER BACK AND HIP DISCOMFORT    As the weather improves, I was looking forward to getting out for more walks. Unfortunately, my lower back and both the right and left hips have other ideas. I can barely make it to the corner and back and that is while wearing a back brace. Twice I had success with therapy but it seems that I now need something more. Do you have someone in the group that might help me?

## 2024-04-11 NOTE — TELEPHONE ENCOUNTER
Due to LS out of office today, RN asked for advise from DV & RP. They both recommended spine ortho for the referral.     Referral to Dr. Cabezas placed, per protocol.     MCM sent to pt with contact information.

## 2024-04-15 ENCOUNTER — TELEPHONE (OUTPATIENT)
Dept: ORTHOPEDICS CLINIC | Facility: CLINIC | Age: 74
End: 2024-04-15

## 2024-04-15 DIAGNOSIS — M54.50 LOW BACK PAIN, UNSPECIFIED BACK PAIN LATERALITY, UNSPECIFIED CHRONICITY, UNSPECIFIED WHETHER SCIATICA PRESENT: Primary | ICD-10-CM

## 2024-04-15 NOTE — TELEPHONE ENCOUNTER
Patient has an appointment scheduled with Dr. Cabezas on 4/19/24 for lower back pain radiating to right hip. Please advise if imaging is needed prior.

## 2024-04-17 RX ORDER — ROSUVASTATIN CALCIUM 10 MG/1
10 TABLET, COATED ORAL NIGHTLY
Qty: 90 TABLET | Refills: 0 | Status: SHIPPED | OUTPATIENT
Start: 2024-04-17

## 2024-04-19 ENCOUNTER — HOSPITAL ENCOUNTER (OUTPATIENT)
Dept: GENERAL RADIOLOGY | Age: 74
Discharge: HOME | End: 2024-04-19
Attending: STUDENT IN AN ORGANIZED HEALTH CARE EDUCATION/TRAINING PROGRAM
Payer: MEDICARE

## 2024-04-19 ENCOUNTER — OFFICE VISIT (OUTPATIENT)
Dept: ORTHOPEDICS CLINIC | Facility: CLINIC | Age: 74
End: 2024-04-19
Payer: MEDICARE

## 2024-04-19 VITALS — BODY MASS INDEX: 33.74 KG/M2 | WEIGHT: 215 LBS | HEIGHT: 67 IN

## 2024-04-19 DIAGNOSIS — M54.50 LOW BACK PAIN, UNSPECIFIED BACK PAIN LATERALITY, UNSPECIFIED CHRONICITY, UNSPECIFIED WHETHER SCIATICA PRESENT: ICD-10-CM

## 2024-04-19 DIAGNOSIS — M54.9 BACK PAIN WITH SCIATICA: Primary | ICD-10-CM

## 2024-04-19 DIAGNOSIS — M54.30 BACK PAIN WITH SCIATICA: Primary | ICD-10-CM

## 2024-04-19 PROCEDURE — 99214 OFFICE O/P EST MOD 30 MIN: CPT | Performed by: STUDENT IN AN ORGANIZED HEALTH CARE EDUCATION/TRAINING PROGRAM

## 2024-04-19 PROCEDURE — 72100 X-RAY EXAM L-S SPINE 2/3 VWS: CPT | Performed by: STUDENT IN AN ORGANIZED HEALTH CARE EDUCATION/TRAINING PROGRAM

## 2024-04-19 NOTE — H&P
[facility] - ORTHOPEDICS  [address] [place] [address], [address], [place] [address]  [address] [place] [address]  [phone]     NEW PATIENT VISIT - HISTORY AND PHYSICAL EXAMINATION     Name: Nasir Beauchamp   MRN: [id number]  Date: 04/19/24       CC: Back pain    REFERRED BY: Staci Miguel MD    HPI:   Nasir Beauchamp is a very pleasant 74 year old male who presents today for evaluation of back and leg pain. The distribution of symptoms are: 80% backpain and 20% leg pain. The symptoms began 1 year(s) ago without any significant injury. Since the onset, the symptoms have become slowly worse over time. Patient feels pain is aggravated by standing, walking and improved by rest. The patient reports no numbness and no weakness.  The symptom characteristics are as follows: Presenting with chronic low back pain.  More recently pain started radiating down right lower associated with tingling of the right leg and foot 2 to 3 months ago.  Patient has difficulty walking more than 1 block and walking his dog. Has nothad formal physical therapy.     Prior spine surgery: none.    Bowel and bladder symptoms: absent.    Treatment up to this time has included:    Evaluation: PCP  NSAIDS: have not been helpful  Narcotic use: None  Physical therapy: None  Spinal injections: None  Others:       PMH:   Past Medical History:    Abdominal hernia    ALCOHOL USE    1-2 times/wk    Arthritis    Back pain    Bronchitis    Chronic left-sided low back pain    Diabetes (HCC)    diet controlled - dx 2/2020    Easy bruising    Elevated lipase    Flatulence/gas pain/belching    Gout    Hernia, abdominal    1973 hernia repair. 2001 double hernia repair    Left knee pain    Obesity    Pacemaker    Pneumonia due to organism    Pulmonary emphysema (HCC)    Wears glasses       PAST SURGICAL HX:  Past Surgical History:   Procedure Laterality Date    Cardiac pacemaker placement  2018    Cataract      Colonoscopy  2014     Colonoscopy N/A 10/08/2021    Procedure: COLONOSCOPY with cold snare polypectomy;  Surgeon: Stewart Tolentino MD;  Location:  ENDOSCOPY    Dental surgery procedure  2009    dental implants    Hernia surgery Right 1973    Hernia surgery Bilateral 2001    Lasik Bilateral 2003    Other surgical history      Dental Implants 6 years ago    Pacemaker/defibrillator      2017    Repair ing hernia,5+y/o,reducibl      Repair of nasal septum N/A 2003    Tonsillectomy  1950's    Vasectomy  1978       FAMILY HX:  Family History   Problem Relation Age of Onset    Other (Other) Father         dementia    Other (Other) Mother         COPD    Asthma Mother     Pulmonary Disease Mother        ALLERGIES:  Patient has no known allergies.    MEDICATIONS:   Current Outpatient Medications   Medication Sig Dispense Refill    rosuvastatin 10 MG Oral Tab TAKE 1 TABLET(10 MG) BY MOUTH EVERY NIGHT 90 tablet 0    chlorhexidine gluconate 0.12 % Mouth/Throat Solution RINSE AND SPIT 15ML BY MOUTH TWICE DAILY. SWISH AND SPIT      predniSONE 10 MG Oral Tab Take 4 tabs (40mg) PO for 3 days, then 3 tabs (30mg) daily for 3 days, then take 2 tabs (20mg) daily for 3 days, then take 1 tab (10mg) daily for 3 days. 30 tablet 0    azithromycin 250 MG Oral Tab take 2 tablet (500MG)  by ORAL route  every day for 1 day then 1 tablet (250 mg) by oral route once daily for 4 days 6 tablet 0    fluticasone-umeclidin-vilant (TRELEGY ELLIPTA) 100-62.5-25 MCG/ACT Inhalation Aerosol Powder, Breath Activated Inhale 1 puff into the lungs daily. 1 each 5    Glucose Blood (CONTOUR NEXT TEST) In Vitro Strip TEST ONCE DAILY 100 strip 3    Microlet Lancets Does not apply Misc Test once daily. 100 each 3    Misc Natural Products (GLUCOSAMINE CHONDROITIN ADV) Oral Tab Take 1 tablet by mouth daily.      latanoprost 0.005 % Ophthalmic Solution Place 1 drop into both eyes nightly.      Multiple Vitamins-Minerals (PRESERVISION AREDS 2) Oral Cap take two tablets by mouth daily          ROS: A comprehensive 14 point review of systems was performed and was negative aside from the aforementioned per history of present illness.    SOCIAL HX:  Social History     Tobacco Use    Smoking status: Former     Current packs/day: 0.00     Average packs/day: 1 pack/day for 52.8 years (52.8 ttl pk-yrs)     Types: Cigarettes     Start date: 1965     Quit date: 10/30/2017     Years since quittin.4     Passive exposure: Past (pt's parents both smoked in the home)    Smokeless tobacco: Never   Substance Use Topics    Alcohol use: Yes     Alcohol/week: 1.0 standard drink of alcohol     Types: 1 Standard drinks or equivalent per week     Comment: 1 drink weekly         PE:   Vitals:    24 0729   Weight: 215 lb (97.5 kg)   Height: 5' 7\" (1.702 m)     Estimated body mass index is 33.67 kg/m² as calculated from the following:    Height as of this encounter: 5' 7\" (1.702 m).    Weight as of this encounter: 215 lb (97.5 kg).    Physical Exam  Constitutional:       Appearance: Normal appearance.   HENT:      Head: Normocephalic and atraumatic.   Eyes:      Extraocular Movements: Extraocular movements intact.   Cardiovascular:      Pulses: Normal pulses. Skin warm and well perfused.  Pulmonary:      Effort: Pulmonary effort is normal. No respiratory distress.   Skin:     General: Skin is warm.   Psychiatric:         Mood and Affect: Mood normal.     Spine Exam:    Normal gait without difficulty  Able to heel, toe, tandem gait without difficulty  Level shoulders and hips in even stance    Restricted L-spine ROM    No tenderness to palpation of L-spine    Straight leg raise test: negative    Sustained clonus: negative    LE Strength: 5/5 IP QUAD TA EHL GSC  LE Sensation: normal in L2-S1 distribution  LE reflexes: normal    Radiographic Examination/Diagnostics:  XR personally viewed, independently interpreted and radiology report was reviewed.  X-ray of the lumbar spine demonstrates degenerative disc  disease, particularly from L3-S1      IMPRESSION: Nasir Beauchamp is a 74 year old male with low back pain and degenerative disc disease, sciatica possibly related to spinal stenosis    PLAN:     We reviewed the patients history, symptoms, exam findings, and imaging today.  We had a detailed discussion outlining the etiology, anatomy, pathophysiology, and natural history of back pain and spinal stenosis. The typical management of this condition may include lifestyle modification, NSAIDs, physical therapy, oral steroids, epidural injections, neuromodulatory medications, and sometimes pain medications.  Based on our discussion today we would like to have the patient initiate our recommendations for continued conservative therapy in the treatment of their condition noted in the assessment section.     - Provided home exercise program  - Referred to Physical Therapy: home exercise program, aerobic exercises, core strengthening and conditioning, possible manipulative therapy,  and modalities as indicated  - Consider MRI if symptoms do not improve    FOLLOW-UP:  We will see him back in follow-up in one month, or sooner if any problems arise. Patient understands and agrees with plan.      Seferino Cabezas MD  Orthopedic Spine Surgeon  [facility]   [address] [place] [address], [address].[facility]  [email or web address]  t: [phone]   f: [phone]        This note was dictated using Dragon software.  While it was briefly proofread prior to completion, some grammatical, spelling, and word choice errors due to dictation may still occur.

## 2024-05-13 ENCOUNTER — PATIENT MESSAGE (OUTPATIENT)
Dept: INTERNAL MEDICINE CLINIC | Facility: CLINIC | Age: 74
End: 2024-05-13

## 2024-05-13 DIAGNOSIS — H91.90 HEARING LOSS, UNSPECIFIED HEARING LOSS TYPE, UNSPECIFIED LATERALITY: ICD-10-CM

## 2024-05-13 DIAGNOSIS — H93.19 TINNITUS, UNSPECIFIED LATERALITY: Primary | ICD-10-CM

## 2024-05-13 NOTE — TELEPHONE ENCOUNTER
See MCM from patient. Ok to recommend Dr Johnson and Audiology with them? Pended if appropriate.     LOV   12/1/2023 Dr Miguel   FOV 6/12/2024

## 2024-05-13 NOTE — TELEPHONE ENCOUNTER
From: Nasir Beauchamp  To: Staci Miguel  Sent: 5/13/2024 11:07 AM CDT  Subject: Referral for ENT    A couple of years ago I decided I needed to change my ENT from Dr. Davis to an ENT who would more aggressively treat my tinnitus. As I have mentioned in the past my tinnitus goes back at lest 25-30 years. I have lived with it for 24 hours a day and 7 days a week and while it did affect my hearing, it seems like my hearing is diminishing at a more alarming rate.    Can you recommend someone who may be more qualified to address my tinnitus issues as well as general hearing for myself and my wife Annia.    Thanks and I loof forward to our June appointment.

## 2024-05-28 ENCOUNTER — OFFICE VISIT (OUTPATIENT)
Dept: ORTHOPEDICS CLINIC | Facility: CLINIC | Age: 74
End: 2024-05-28

## 2024-05-28 DIAGNOSIS — M54.9 BACK PAIN WITH SCIATICA: Primary | ICD-10-CM

## 2024-05-28 DIAGNOSIS — M54.30 BACK PAIN WITH SCIATICA: Primary | ICD-10-CM

## 2024-05-28 PROCEDURE — 99213 OFFICE O/P EST LOW 20 MIN: CPT | Performed by: STUDENT IN AN ORGANIZED HEALTH CARE EDUCATION/TRAINING PROGRAM

## 2024-05-28 NOTE — PROGRESS NOTES
[facility] - ORTHOPEDICS  [address] [place] [address], [address], [place] [address]  [address] [place] [address]  [phone]     FOLLOW-UP PATIENT VISIT    Name: Nasir Beauchamp   MRN: [id number]  Date: 5/28/2024     CC: back and leg pain      INTERVAL HISTORY:   Nasir Beauchamp is a 74 year old male  follow-up patient whom I have been treating conservatively. Patient returns today for reevaluation of back and leg pain.     Patient was last seen in clinic in April and referred to physical therapy and provided home exercise programs.  Patient has been doing home exercises with near resolution of leg pain.  Patient still has low back pain as well as difficulty walking long distances and walking his dog.    Bowel and bladder symptoms: absent.    We have tried the following interventions thus far: home exercise, medications    ROS: No fever/chills or other constitutional issues.    PE:   There were no vitals filed for this visit.  Estimated body mass index is 33.67 kg/m² as calculated from the following:    Height as of 4/19/24: 5' 7\" (1.702 m).    Weight as of 4/19/24: 215 lb (97.5 kg).    On physical examination, he is awake, alert and oriented x 3 and in no acute distress. Mood, affect and language are normal. He appears well developed and well nourished.  He walks without a nonantalgic, nonmyelopathic, non-Trendelenburg gait. Motor strength testing of the lower extremities shows 5/5 strength in hip flexors, knee extensors, ankle dorsiflexors, toe extensor, and gastroc-soleus complex.   Sensation is intact to light touch L2-S1 distributions bilaterally. Reflexes normal.     IMPRESSION: Nasir Beauchamp is a 74 year old male with low back pain and degenerative disc disease, sciatica possibly related to spinal stenosis     PLAN:   We had a detailed discussion outlining the etiology, anatomy, pathophysiology, and natural history of low back pain.  - Trial of formal PT  - Consider MRI if  symptoms do not improve    FOLLOW-UP:  We will see him back in follow-up in 6 weeks, or sooner if any problems arise. Patient understands and agrees with plan.    I spent 20 minutes in preparation to see the patient, counseling/education of relevant pathology, discussing imaging results, ordering medication/therapy intervention, and care coordination.        Seferino Cabezas MD  Orthopedic Spine Surgeon  [facility]   [address] [place] [address].[facility]  [email or web address]  t: [phone]   f: [phone]        This note was dictated using Dragon software.  While it was briefly proofread prior to completion, some grammatical, spelling, and word choice errors due to dictation may still occur.

## 2024-06-10 ENCOUNTER — LAB ENCOUNTER (OUTPATIENT)
Dept: LAB | Age: 74
End: 2024-06-10
Attending: INTERNAL MEDICINE
Payer: MEDICARE

## 2024-06-10 DIAGNOSIS — N18.2 STAGE 2 CHRONIC KIDNEY DISEASE: ICD-10-CM

## 2024-06-10 DIAGNOSIS — E11.9 DIET-CONTROLLED TYPE 2 DIABETES MELLITUS (HCC): ICD-10-CM

## 2024-06-10 LAB
ANION GAP SERPL CALC-SCNC: 5 MMOL/L (ref 0–18)
BUN BLD-MCNC: 20 MG/DL (ref 9–23)
CALCIUM BLD-MCNC: 8.7 MG/DL (ref 8.5–10.1)
CHLORIDE SERPL-SCNC: 108 MMOL/L (ref 98–112)
CHOLEST SERPL-MCNC: 89 MG/DL (ref ?–200)
CO2 SERPL-SCNC: 27 MMOL/L (ref 21–32)
CREAT BLD-MCNC: 1.35 MG/DL
CREAT UR-SCNC: 127 MG/DL
EGFRCR SERPLBLD CKD-EPI 2021: 54 ML/MIN/1.73M2 (ref 60–?)
EST. AVERAGE GLUCOSE BLD GHB EST-MCNC: 140 MG/DL (ref 68–126)
FASTING PATIENT LIPID ANSWER: YES
FASTING STATUS PATIENT QL REPORTED: YES
GLUCOSE BLD-MCNC: 138 MG/DL (ref 70–99)
HBA1C MFR BLD: 6.5 % (ref ?–5.7)
HDLC SERPL-MCNC: 27 MG/DL (ref 40–59)
LDLC SERPL CALC-MCNC: 37 MG/DL (ref ?–100)
MICROALBUMIN UR-MCNC: 3.44 MG/DL
MICROALBUMIN/CREAT 24H UR-RTO: 27.1 UG/MG (ref ?–30)
NONHDLC SERPL-MCNC: 62 MG/DL (ref ?–130)
OSMOLALITY SERPL CALC.SUM OF ELEC: 295 MOSM/KG (ref 275–295)
POTASSIUM SERPL-SCNC: 4.5 MMOL/L (ref 3.5–5.1)
SODIUM SERPL-SCNC: 140 MMOL/L (ref 136–145)
TRIGL SERPL-MCNC: 142 MG/DL (ref 30–149)
VLDLC SERPL CALC-MCNC: 19 MG/DL (ref 0–30)

## 2024-06-10 PROCEDURE — 80061 LIPID PANEL: CPT

## 2024-06-10 PROCEDURE — 82043 UR ALBUMIN QUANTITATIVE: CPT

## 2024-06-10 PROCEDURE — 82570 ASSAY OF URINE CREATININE: CPT

## 2024-06-10 PROCEDURE — 80048 BASIC METABOLIC PNL TOTAL CA: CPT

## 2024-06-10 PROCEDURE — 83036 HEMOGLOBIN GLYCOSYLATED A1C: CPT

## 2024-06-10 PROCEDURE — 36415 COLL VENOUS BLD VENIPUNCTURE: CPT

## 2024-06-12 ENCOUNTER — OFFICE VISIT (OUTPATIENT)
Dept: INTERNAL MEDICINE CLINIC | Facility: CLINIC | Age: 74
End: 2024-06-12
Payer: MEDICARE

## 2024-06-12 VITALS
HEART RATE: 77 BPM | SYSTOLIC BLOOD PRESSURE: 122 MMHG | OXYGEN SATURATION: 95 % | TEMPERATURE: 98 F | DIASTOLIC BLOOD PRESSURE: 60 MMHG | WEIGHT: 222.38 LBS | HEIGHT: 67 IN | BODY MASS INDEX: 34.9 KG/M2 | RESPIRATION RATE: 16 BRPM

## 2024-06-12 DIAGNOSIS — R09.82 POSTNASAL DRIP: ICD-10-CM

## 2024-06-12 DIAGNOSIS — E78.1 HYPERTRIGLYCERIDEMIA: ICD-10-CM

## 2024-06-12 DIAGNOSIS — R09.89 PHLEGM IN THROAT: ICD-10-CM

## 2024-06-12 DIAGNOSIS — E11.9 TYPE 2 DIABETES MELLITUS WITHOUT COMPLICATION, WITHOUT LONG-TERM CURRENT USE OF INSULIN (HCC): Primary | ICD-10-CM

## 2024-06-12 PROCEDURE — 99214 OFFICE O/P EST MOD 30 MIN: CPT | Performed by: INTERNAL MEDICINE

## 2024-06-12 NOTE — PROGRESS NOTES
[facility] Office Note  Chief Complaint:   Chief Complaint   Patient presents with    Medication Follow-Up       HPI:   This is a 74 year old male coming in for 6 month follow up and several concerns   HPI    He continues to have tinnitus in the left ear. He is seeing ENT in July    He has had bloody noses x3 from the left nose     Phlegm in throat  He has postnasal drip from time to time     Tingling in his left arm from just below the elbow down to the fingers. Excludes the finger tips     Breathing has been doing well. He is on Trelegy     When he is walking his dog, his back hurts   He saw ortho - Dr. Cabezas and plans to start physical therapy     Pain in thumb and has received injection         Patient Active Problem List   Diagnosis    Rotator cuff syndrome of left shoulder    History of smoking 25-50 pack years    Anemia    Vertigo    Carotid disease, bilateral ([facility])    Meniere's disease    Presence of cardiac pacemaker    PVD (peripheral vascular disease) ([facility])    RBBB (right bundle branch block with left anterior fascicular block)    Diet-controlled type 2 diabetes mellitus ([facility])    Pacemaker    Left knee pain    Easy bruising    Chronic left-sided low back pain    Arthritis    Abdominal hernia    Right inguinal hernia    Dyspnea on exertion     Past Surgical History:   Procedure Laterality Date    Cardiac pacemaker placement  2018    Cataract      Colonoscopy  2014    Colonoscopy N/A 10/08/2021    Procedure: COLONOSCOPY with cold snare polypectomy;  Surgeon: Stewart Tolentino MD;  Location:  ENDOSCOPY    Dental surgery procedure  2009    dental implants    Hernia surgery Right 1973    Hernia surgery Bilateral 2001    Lasik Bilateral 2003    Other surgical history      Dental Implants 6 years ago    Pacemaker/defibrillator      2017    Repair ing hernia,5+y/o,reducibl      Repair of nasal septum N/A 2003    Tonsillectomy  1950's    Vasectomy  1978     Family History   Problem  Relation Age of Onset    Other (Other) Father         dementia    Other (Other) Mother         COPD    Asthma Mother     Pulmonary Disease Mother         I reviewed his's Past Medical History, Past Surgical History, Family History and   Social History updated shows  Social History     Socioeconomic History    Marital status:    Tobacco Use    Smoking status: Former     Current packs/day: 0.00     Average packs/day: 1 pack/day for 52.8 years (52.8 ttl pk-yrs)     Types: Cigarettes     Start date: 1965     Quit date: 10/30/2017     Years since quittin.6     Passive exposure: Past (pt's parents both smoked in the home)    Smokeless tobacco: Never   Vaping Use    Vaping status: Never Used   Substance and Sexual Activity    Alcohol use: Yes     Alcohol/week: 1.0 standard drink of alcohol     Types: 1 Standard drinks or equivalent per week     Comment: 1 drink weekly    Drug use: Never   Other Topics Concern    Caffeine Concern No    Exercise No    Seat Belt No    Special Diet No    Stress Concern No    Weight Concern No     Social Determinants of Health      Received from [facility]     Allergies:  No Known Allergies  Current Outpatient Medications   Medication Sig Dispense Refill    metFORMIN 500 MG Oral Tab Take 1 tablet (500 mg total) by mouth daily with breakfast. 30 tablet 0    rosuvastatin 10 MG Oral Tab TAKE 1 TABLET(10 MG) BY MOUTH EVERY NIGHT 90 tablet 0    chlorhexidine gluconate 0.12 % Mouth/Throat Solution RINSE AND SPIT 15ML BY MOUTH TWICE DAILY. SWISH AND SPIT      fluticasone-umeclidin-vilant (TRELEGY ELLIPTA) 100-62.5-25 MCG/ACT Inhalation Aerosol Powder, Breath Activated Inhale 1 puff into the lungs daily. 1 each 5    Glucose Blood (CONTOUR NEXT TEST) In Vitro Strip TEST ONCE DAILY 100 strip 3    Microlet Lancets Does not apply Misc Test once daily. 100 each 3    Misc Natural Products (GLUCOSAMINE CHONDROITIN ADV) Oral Tab Take 1 tablet by mouth daily.      latanoprost  0.005 % Ophthalmic Solution Place 1 drop into both eyes nightly.      Multiple Vitamins-Minerals (PRESERVISION AREDS 2) Oral Cap take two tablets by mouth daily           REVIEW OF SYSTEMS:   Review of Systems   Constitutional:  Negative for fever.   Eyes:  Negative for visual disturbance.   Respiratory:  Negative for shortness of breath.    Cardiovascular:  Negative for chest pain.   Gastrointestinal:  Negative for constipation.   Neurological: Negative.    Hematological: Negative.    Psychiatric/Behavioral: Negative.          EXAM:   /60   Pulse 77   Temp 98 °F (36.7 °C) (Temporal)   Resp 16   Ht 5' 7\" (1.702 m)   Wt 222 lb 6.4 oz (100.9 kg)   SpO2 95%   BMI 34.83 kg/m²  Estimated body mass index is 34.83 kg/m² as calculated from the following:    Height as of this encounter: 5' 7\" (1.702 m).    Weight as of this encounter: 222 lb 6.4 oz (100.9 kg).   Vital signs reviewed. Appears stated age, well groomed.  Physical Exam  Vitals reviewed.   Constitutional:       General: He is not in acute distress.     Appearance: He is well-developed.   HENT:      Head: Normocephalic and atraumatic.   Eyes:      Conjunctiva/sclera: Conjunctivae normal.   Cardiovascular:      Rate and Rhythm: Normal rate and regular rhythm.      Heart sounds: Normal heart sounds.   Pulmonary:      Effort: Pulmonary effort is normal.      Breath sounds: Normal breath sounds.   Musculoskeletal:      Cervical back: Neck supple.   Skin:     General: Skin is warm and dry.   Neurological:      General: No focal deficit present.      Mental Status: He is alert.   Psychiatric:         Mood and Affect: Mood normal.          ASSESSMENT AND PLAN:   Nasir Beauchamp is a 74 year old male with  1. Type 2 diabetes mellitus without complication, without long-term current use of insulin ([facility])    2. Hypertriglyceridemia          The plan is as follows  Nasir was seen today for medication follow-up.    Diagnoses and all orders for this visit:    Type 2  diabetes mellitus without complication, without long-term current use of insulin (HCC) -starting metformin 500mg once a day since A1c continues to be high at 6.5.   -DM eye exam scheduled for next month     Hypertriglyceridemia - cont statin    Postnasal drip and   Phlegm in throat - defer to ENT. I do not want to start a nasal spray for postnasal drip due to risk of nosebleed since he has had 3 recent nosebleeds from left nostril. He has an appt scheduled in a few weeks.     Other orders  -     metFORMIN 500 MG Oral Tab; Take 1 tablet (500 mg total) by mouth daily with breakfast.    F/u 6 months     Meds & Refills for this Visit:  Requested Prescriptions     Signed Prescriptions Disp Refills    metFORMIN 500 MG Oral Tab 30 tablet 0     Sig: Take 1 tablet (500 mg total) by mouth daily with breakfast.       Imaging & Consults:  None    Health Maintenance Due   Topic Date Due    Zoster Vaccines (1 of 2) Never done    COVID-19 Vaccine (3 - 2023-24 season) 09/01/2023    Annual Depression Screening  01/01/2024    Diabetes Care Dilated Eye Exam  03/21/2024    Colorectal Cancer Screening  10/08/2024     Patient/Caregiver Education: Patient/Caregiver Education: There are no barriers to learning. Medical education done. Outcome: Patient verbalizes understanding. Patient is notified to call with any questions, complications, allergies, or worsening or changing symptoms.  Patient is to call with any side effects or complications from the treatments as a result of today.     Staci Miguel MD

## 2024-06-12 NOTE — PATIENT INSTRUCTIONS
Metformin start once daily for diabetes     Miralax -over the counter and can use for constipation as needed.     Talk with your ENT about:  1) phlegm in throat  2) frequent bloody nose  3) tinnitus

## 2024-06-14 NOTE — TELEPHONE ENCOUNTER
Received RX refill request for: nia  Pt has next scheduled appt: 10/11/24  Pt last OV: 12/08/24. Last OV note states: \"Restart trelegy; new rx sent to pharmacy; rinse your mouth after using\"  RX pended and routed to provider.

## 2024-06-15 RX ORDER — FLUTICASONE FUROATE, UMECLIDINIUM BROMIDE AND VILANTEROL TRIFENATATE 100; 62.5; 25 UG/1; UG/1; UG/1
1 POWDER RESPIRATORY (INHALATION) DAILY
Qty: 3 EACH | Refills: 0 | Status: SHIPPED | OUTPATIENT
Start: 2024-06-15

## 2024-06-20 ENCOUNTER — OFFICE VISIT (OUTPATIENT)
Dept: ORTHOPEDICS CLINIC | Facility: CLINIC | Age: 74
End: 2024-06-20

## 2024-06-20 VITALS — WEIGHT: 222 LBS | HEIGHT: 67 IN | BODY MASS INDEX: 34.84 KG/M2

## 2024-06-20 DIAGNOSIS — M16.12 PRIMARY OSTEOARTHRITIS OF LEFT HIP: Primary | ICD-10-CM

## 2024-06-20 PROCEDURE — 20600 DRAIN/INJ JOINT/BURSA W/O US: CPT | Performed by: PHYSICIAN ASSISTANT

## 2024-06-20 PROCEDURE — 99213 OFFICE O/P EST LOW 20 MIN: CPT | Performed by: PHYSICIAN ASSISTANT

## 2024-06-20 RX ORDER — BETAMETHASONE SODIUM PHOSPHATE AND BETAMETHASONE ACETATE 3; 3 MG/ML; MG/ML
6 INJECTION, SUSPENSION INTRA-ARTICULAR; INTRALESIONAL; INTRAMUSCULAR; SOFT TISSUE ONCE
Status: COMPLETED | OUTPATIENT
Start: 2024-06-20 | End: 2024-06-20

## 2024-06-20 RX ADMIN — BETAMETHASONE SODIUM PHOSPHATE AND BETAMETHASONE ACETATE 6 MG: 3; 3 INJECTION, SUSPENSION INTRA-ARTICULAR; INTRALESIONAL; INTRAMUSCULAR; SOFT TISSUE at 09:02:00

## 2024-06-20 NOTE — PROGRESS NOTES
Operative Report       Assessment/Plan:  74 year old male    Right thumb CMC joint arthritis- S/P 2 injections with no improvement at the last injection.  We did discuss CMC arthroplasty and he would likely have that surgery done at some point.  He would like to try and get through the summer.  We discussed 1 more cortisone injection.  He elected to proceed and he tolerated procedure very well.    Injection: Written consent was obtained.  Skin was prepped with ChloraPrep.  1 mL of 6mg betamethasone and 1 mL of 1% lidocaine was injected into the right thumb CMC joint.  Patient tolerated the procedure well.  No complications were encountered.  Band-Aid was applied.    Follow Up: As needed    Diagnostic Studies:  X-rays of her right hand reveal evidence of CMC joint arthritis of the thumb.  No no acute fractures occasions performed    Physical Exam:    Ht 5' 7\" (1.702 m)   Wt 222 lb (100.7 kg)   BMI 34.77 kg/m²     Constitutional: NAD. AOx3. Well-developed and Well-nourished.   Psychiatric: Normal mood/ affect/ behavior. Judgment and thought content normal.     Right upper Extremity:   Inspection: Skin Intact. No skin lesions. No gross deformity.   Palpation:  Tender to palpation over the right CMC joint positive relocation test   Motion: Elbow: normal bilateral symmetric ext/flex  Wrist: normal bilateral symmetric ext/flex/sup/pro  Finger: full composite fist  Right MCP hyperextension of 20-25 degrees.        CC: Follow - Up (RT THUMB CMC ARTHRITIS; HAS HAD 2 INJ/)        HPI: This 74 year old male presents with complaints of pain to his right thumb.  He states has been ongoing for years but now over the last month it is getting much worse.  He rates it a 3 out of 10.  He wears a brace as needed.  He states he has pain with pinching gripping grasping activities. He received a corticosteroid in the past with improvement in symptoms, with recurrence occurring 2-3 months ago.       Interval history: Patient states  he continues to have thumb pain.  The last injection did not help.    Past Medical History:    Abdominal hernia    ALCOHOL USE    1-2 times/wk    Arthritis    Back pain    Bronchitis    Chronic left-sided low back pain    Diabetes (HCC)    diet controlled - dx 2/2020    Easy bruising    Elevated lipase    Flatulence/gas pain/belching    Gout    Hernia, abdominal    1973 hernia repair. 2001 double hernia repair    Left knee pain    Obesity    Pacemaker    Pneumonia due to organism    Pulmonary emphysema (HCC)    Wears glasses     Past Surgical History:   Procedure Laterality Date    Cardiac pacemaker placement  2018    Cataract      Colonoscopy  2014    Colonoscopy N/A 10/08/2021    Procedure: COLONOSCOPY with cold snare polypectomy;  Surgeon: Stewart Tolentino MD;  Location:  ENDOSCOPY    Dental surgery procedure  2009    dental implants    Hernia surgery Right 1973    Hernia surgery Bilateral 2001    Lasik Bilateral 2003    Other surgical history      Dental Implants 6 years ago    Pacemaker/defibrillator      2017    Repair ing hernia,5+y/o,reducibl      Repair of nasal septum N/A 2003    Tonsillectomy  1950's    Vasectomy  1978     Current Outpatient Medications   Medication Sig Dispense Refill    fluticasone-umeclidin-vilant (TRELEGY ELLIPTA) 100-62.5-25 MCG/ACT Inhalation Aerosol Powder, Breath Activated Inhale 1 puff into the lungs daily. 3 each 0    METFORMIN 500 MG Oral Tab TAKE 1 TABLET(500 MG) BY MOUTH DAILY WITH BREAKFAST 90 tablet 0    rosuvastatin 10 MG Oral Tab TAKE 1 TABLET(10 MG) BY MOUTH EVERY NIGHT 90 tablet 0    chlorhexidine gluconate 0.12 % Mouth/Throat Solution RINSE AND SPIT 15ML BY MOUTH TWICE DAILY. SWISH AND SPIT      fluticasone-umeclidin-vilant (TRELEGY ELLIPTA) 100-62.5-25 MCG/ACT Inhalation Aerosol Powder, Breath Activated Inhale 1 puff into the lungs daily. 1 each 5    Glucose Blood (CONTOUR NEXT TEST) In Vitro Strip TEST ONCE DAILY 100 strip 3    Microlet Lancets Does not apply Misc  Test once daily. 100 each 3    Misc Natural Products (GLUCOSAMINE CHONDROITIN ADV) Oral Tab Take 1 tablet by mouth daily.      latanoprost 0.005 % Ophthalmic Solution Place 1 drop into both eyes nightly.      Multiple Vitamins-Minerals (PRESERVISION AREDS 2) Oral Cap take two tablets by mouth daily       No Known Allergies  Family History   Problem Relation Age of Onset    Other (Other) Father         dementia    Other (Other) Mother         COPD    Asthma Mother     Pulmonary Disease Mother      Social History     Occupational History    Not on file   Tobacco Use    Smoking status: Former     Current packs/day: 0.00     Average packs/day: 1 pack/day for 52.8 years (52.8 ttl pk-yrs)     Types: Cigarettes     Start date: 1965     Quit date: 10/30/2017     Years since quittin.6     Passive exposure: Past (pt's parents both smoked in the home)    Smokeless tobacco: Never   Vaping Use    Vaping status: Never Used   Substance and Sexual Activity    Alcohol use: Yes     Alcohol/week: 1.0 standard drink of alcohol     Types: 1 Standard drinks or equivalent per week     Comment: 1 drink weekly    Drug use: Never    Sexual activity: Not on file        Review of Systems (negative unless bolded):  General: fevers, chills, fatigue  CV:  chest pain, palpitations, leg swelling  Msk: bodyaches, neck pain, neck stiffness  Skin: rashes, open wounds, nonhealing ulcers  Hem: bleeds easily, bruise easily, immunocompromised  Neuro: dizziness, light headedness, headaches  Psych: anxious, depressed, anger issues  Ivelisse Cates PA-C  Hand, Wrist, & Elbow Surgery  Physician Assistant to Dr. [email or web address]  t: [phone]  f: [phone]

## 2024-07-03 ENCOUNTER — OFFICE VISIT (OUTPATIENT)
Facility: LOCATION | Age: 74
End: 2024-07-03
Payer: MEDICARE

## 2024-07-03 DIAGNOSIS — R09.82 POST-NASAL DRAINAGE: ICD-10-CM

## 2024-07-03 DIAGNOSIS — H90.3 ASYMMETRIC SNHL (SENSORINEURAL HEARING LOSS): ICD-10-CM

## 2024-07-03 DIAGNOSIS — R04.0 EPISTAXIS: Primary | ICD-10-CM

## 2024-07-03 DIAGNOSIS — H93.293 ABNORMAL AUDITORY PERCEPTION OF BOTH EARS: Primary | ICD-10-CM

## 2024-07-03 PROCEDURE — 99203 OFFICE O/P NEW LOW 30 MIN: CPT | Performed by: OTOLARYNGOLOGY

## 2024-07-03 PROCEDURE — 92567 TYMPANOMETRY: CPT | Performed by: AUDIOLOGIST

## 2024-07-03 PROCEDURE — 92557 COMPREHENSIVE HEARING TEST: CPT | Performed by: AUDIOLOGIST

## 2024-07-03 RX ORDER — IPRATROPIUM BROMIDE 42 UG/1
1 SPRAY, METERED NASAL 2 TIMES DAILY
Qty: 1 EACH | Refills: 5 | Status: SHIPPED | OUTPATIENT
Start: 2024-07-03
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
## 2024-07-03 NOTE — PROGRESS NOTES
Nasir Beauchamp was seen for audiometric evaluation today.  Referred back to physician.     Tita Hadley

## 2024-07-17 RX ORDER — ROSUVASTATIN CALCIUM 10 MG/1
10 TABLET, COATED ORAL NIGHTLY
Qty: 90 TABLET | Refills: 1 | Status: SHIPPED | OUTPATIENT
Start: 2024-07-17

## 2024-07-17 NOTE — TELEPHONE ENCOUNTER
LOV: 6/12/24   RTC: 6 months   Filled: 4/17/24 #90   Labs: 6/10/24   Future Appointments   Date Time Provider Department Center   8/15/2024  3:10 PM Rufino Mares MD [email or web address]   10/11/2024  8:30 AM Jenny Haywood APRN  [facility] [place]

## 2024-08-15 ENCOUNTER — OFFICE VISIT (OUTPATIENT)
Facility: LOCATION | Age: 74
End: 2024-08-15
Payer: MEDICARE

## 2024-08-15 DIAGNOSIS — J31.2 CHRONIC SORE THROAT: ICD-10-CM

## 2024-08-15 DIAGNOSIS — R09.82 POST-NASAL DRAINAGE: Primary | ICD-10-CM

## 2024-08-15 PROCEDURE — 99213 OFFICE O/P EST LOW 20 MIN: CPT | Performed by: OTOLARYNGOLOGY

## 2024-08-15 NOTE — PROGRESS NOTES
Nasir Beauchamp is a 74 year old male.   Chief Complaint   Patient presents with    Throat Problem     HPI:   He decreased his Trelegy use to every other day and his sore throat is resolved.  His sinuses are doing well at this time.  He has had no bleeding or drainage.    REVIEW OF SYSTEMS:   GENERAL HEALTH: feels well otherwise  GENERAL : denies fever, chills, sweats, weight loss, weight gain  SKIN: denies any unusual skin lesions or rashes  RESPIRATORY: denies shortness of breath with exertion  NEURO: denies headaches    EXAM:   There were no vitals taken for this visit.    System Findings Details   Constitutional  Overall appearance - Normal.   Psychiatric  Orientation - Oriented to time, place, person & situation. Appropriate mood and affect.   Head/Face  Facial features -- Normal. Skull - Normal.   Eyes  Pupils equal ,round ,react to light and accomidate   Ears, Nose, Throat, Neck  Ears clear nose clear pharynx clear neck no masses   Neurological  Memory - Normal. Cranial nerves - Cranial nerves II through XII grossly intact.   Lymph Detail  Submental. Submandibular. Anterior cervical. Posterior cervical. Supraclavicular.       ASSESSMENT AND PLAN:   1. Post-nasal drainage  Improved.  He may use saline as needed for dryness and add Atrovent nasal spray as needed.    2. Chronic sore throat  Improved after decreasing his Trelegy to every other day.  This suggest the possibility that maybe there is a low-grade yeast problem playing a role.  He will see me back for return of symptoms.      The patient indicates understanding of these issues and agrees to the plan.    No follow-ups on file.    Rufino Mares MD  8/15/2024  3:54 PM

## 2024-08-30 ENCOUNTER — PATIENT MESSAGE (OUTPATIENT)
Dept: INTERNAL MEDICINE CLINIC | Facility: CLINIC | Age: 74
End: 2024-08-30

## 2024-08-30 DIAGNOSIS — K63.5 POLYP OF COLON, UNSPECIFIED PART OF COLON, UNSPECIFIED TYPE: Primary | ICD-10-CM

## 2024-08-30 NOTE — TELEPHONE ENCOUNTER
From: Nasir Beauchamp  To: Staci Miguel  Sent: 8/30/2024 8:33 AM CDT  Subject: COLONOSCOPY ??    I have received a letter from [facility] asking if I need a colonoscopy.    Interesting question that I don't have an answer for. Am I due for one and is [facility]?

## 2024-09-09 NOTE — TELEPHONE ENCOUNTER
LOV: 6/12/24  RTC: 6 months  Filled: 6/12/24 #90   Labs: 6/10/24   Future Appointments   Date Time Provider Department Center   9/12/2024  2:30 PM PF CT RM1 [facility] [place]   9/13/2024  7:40 AM Sefernio Cabezas MD [facility] [address]   10/11/2024  8:20 AM Chase Stokes MD  [facility] [place]   12/13/2024  8:00 AM Staci Miguel MD [facility] [place]

## 2024-09-10 ENCOUNTER — APPOINTMENT (OUTPATIENT)
Dept: GENERAL RADIOLOGY | Age: 74
End: 2024-09-10
Attending: EMERGENCY MEDICINE
Payer: MEDICARE

## 2024-09-10 ENCOUNTER — HOSPITAL ENCOUNTER (OUTPATIENT)
Facility: HOSPITAL | Age: 74
Setting detail: OBSERVATION
Discharge: HOME | End: 2024-09-12
Attending: EMERGENCY MEDICINE | Admitting: INTERNAL MEDICINE
Payer: MEDICARE

## 2024-09-10 DIAGNOSIS — R07.89 CHEST PAIN, ATYPICAL: Primary | ICD-10-CM

## 2024-09-10 LAB
ALBUMIN SERPL-MCNC: 3.6 G/DL (ref 3.4–5)
ALBUMIN/GLOB SERPL: 1.1 {RATIO} (ref 1–2)
ALP LIVER SERPL-CCNC: 55 U/L
ALT SERPL-CCNC: 26 U/L
ANION GAP SERPL CALC-SCNC: 7 MMOL/L (ref 0–18)
AST SERPL-CCNC: 22 U/L (ref 15–37)
BASOPHILS # BLD AUTO: 0.06 X10(3) UL (ref 0–0.2)
BASOPHILS NFR BLD AUTO: 0.5 %
BILIRUB SERPL-MCNC: 0.4 MG/DL (ref 0.1–2)
BUN BLD-MCNC: 18 MG/DL (ref 9–23)
CALCIUM BLD-MCNC: 8.8 MG/DL (ref 8.5–10.1)
CHLORIDE SERPL-SCNC: 106 MMOL/L (ref 98–112)
CO2 SERPL-SCNC: 25 MMOL/L (ref 21–32)
CREAT BLD-MCNC: 1.23 MG/DL
D DIMER PPP FEU-MCNC: 0.49 UG/ML FEU (ref ?–0.76)
EGFRCR SERPLBLD CKD-EPI 2021: 61 ML/MIN/1.73M2 (ref 60–?)
EOSINOPHIL # BLD AUTO: 0.3 X10(3) UL (ref 0–0.7)
EOSINOPHIL NFR BLD AUTO: 2.6 %
ERYTHROCYTE [DISTWIDTH] IN BLOOD BY AUTOMATED COUNT: 16.8 %
GLOBULIN PLAS-MCNC: 3.3 G/DL (ref 2.8–4.4)
GLUCOSE BLD-MCNC: 115 MG/DL (ref 70–99)
GLUCOSE BLD-MCNC: 143 MG/DL (ref 70–99)
HCT VFR BLD AUTO: 39.7 %
HGB BLD-MCNC: 13.1 G/DL
IMM GRANULOCYTES # BLD AUTO: 0.07 X10(3) UL (ref 0–1)
IMM GRANULOCYTES NFR BLD: 0.6 %
LYMPHOCYTES # BLD AUTO: 2.39 X10(3) UL (ref 1–4)
LYMPHOCYTES NFR BLD AUTO: 20.6 %
MCH RBC QN AUTO: 26.8 PG (ref 26–34)
MCHC RBC AUTO-ENTMCNC: 33 G/DL (ref 31–37)
MCV RBC AUTO: 81.4 FL
MONOCYTES # BLD AUTO: 1.12 X10(3) UL (ref 0.1–1)
MONOCYTES NFR BLD AUTO: 9.7 %
NEUTROPHILS # BLD AUTO: 7.64 X10 (3) UL (ref 1.5–7.7)
NEUTROPHILS # BLD AUTO: 7.64 X10(3) UL (ref 1.5–7.7)
NEUTROPHILS NFR BLD AUTO: 66 %
OSMOLALITY SERPL CALC.SUM OF ELEC: 290 MOSM/KG (ref 275–295)
PLATELET # BLD AUTO: 206 10(3)UL (ref 150–450)
POTASSIUM SERPL-SCNC: 3.9 MMOL/L (ref 3.5–5.1)
PROT SERPL-MCNC: 6.9 G/DL (ref 6.4–8.2)
RBC # BLD AUTO: 4.88 X10(6)UL
SODIUM SERPL-SCNC: 138 MMOL/L (ref 136–145)
TROPONIN I SERPL HS-MCNC: 5 NG/L
TROPONIN I SERPL HS-MCNC: 6 NG/L
TROPONIN I SERPL HS-MCNC: 9 NG/L
WBC # BLD AUTO: 11.6 X10(3) UL (ref 4–11)

## 2024-09-10 PROCEDURE — 99497 ADVNCD CARE PLAN 30 MIN: CPT | Performed by: HOSPITALIST

## 2024-09-10 PROCEDURE — 99223 1ST HOSP IP/OBS HIGH 75: CPT | Performed by: HOSPITALIST

## 2024-09-10 PROCEDURE — 71045 X-RAY EXAM CHEST 1 VIEW: CPT | Performed by: EMERGENCY MEDICINE

## 2024-09-10 RX ORDER — HEPARIN SODIUM 5000 [USP'U]/ML
5000 INJECTION, SOLUTION INTRAVENOUS; SUBCUTANEOUS EVERY 12 HOURS SCHEDULED
Status: DISCONTINUED | OUTPATIENT
Start: 2024-09-10 | End: 2024-09-12

## 2024-09-10 RX ORDER — POLYETHYLENE GLYCOL 3350 17 G/17G
17 POWDER, FOR SOLUTION ORAL DAILY PRN
Status: DISCONTINUED | OUTPATIENT
Start: 2024-09-10 | End: 2024-09-12

## 2024-09-10 RX ORDER — METOCLOPRAMIDE HYDROCHLORIDE 5 MG/ML
10 INJECTION INTRAMUSCULAR; INTRAVENOUS EVERY 8 HOURS PRN
Status: DISCONTINUED | OUTPATIENT
Start: 2024-09-10 | End: 2024-09-10

## 2024-09-10 RX ORDER — BRIMONIDINE TARTRATE 2 MG/ML
1 SOLUTION/ DROPS OPHTHALMIC EVERY 12 HOURS
COMMUNITY

## 2024-09-10 RX ORDER — ACETAMINOPHEN 500 MG
500 TABLET ORAL EVERY 4 HOURS PRN
Status: DISCONTINUED | OUTPATIENT
Start: 2024-09-10 | End: 2024-09-12

## 2024-09-10 RX ORDER — SENNOSIDES 8.6 MG
17.2 TABLET ORAL NIGHTLY PRN
Status: DISCONTINUED | OUTPATIENT
Start: 2024-09-10 | End: 2024-09-12

## 2024-09-10 RX ORDER — MELATONIN
3 NIGHTLY PRN
Status: DISCONTINUED | OUTPATIENT
Start: 2024-09-10 | End: 2024-09-12

## 2024-09-10 RX ORDER — BISACODYL 10 MG
10 SUPPOSITORY, RECTAL RECTAL
Status: DISCONTINUED | OUTPATIENT
Start: 2024-09-10 | End: 2024-09-12

## 2024-09-10 RX ORDER — NICOTINE POLACRILEX 4 MG
15 LOZENGE BUCCAL
Status: DISCONTINUED | OUTPATIENT
Start: 2024-09-10 | End: 2024-09-12

## 2024-09-10 RX ORDER — NICOTINE POLACRILEX 4 MG
30 LOZENGE BUCCAL
Status: DISCONTINUED | OUTPATIENT
Start: 2024-09-10 | End: 2024-09-12

## 2024-09-10 RX ORDER — DEXTROSE MONOHYDRATE 25 G/50ML
50 INJECTION, SOLUTION INTRAVENOUS
Status: DISCONTINUED | OUTPATIENT
Start: 2024-09-10 | End: 2024-09-12

## 2024-09-10 RX ORDER — ONDANSETRON 2 MG/ML
4 INJECTION INTRAMUSCULAR; INTRAVENOUS EVERY 6 HOURS PRN
Status: DISCONTINUED | OUTPATIENT
Start: 2024-09-10 | End: 2024-09-12

## 2024-09-10 RX ORDER — ASPIRIN 81 MG/1
324 TABLET, CHEWABLE ORAL ONCE
Status: COMPLETED | OUTPATIENT
Start: 2024-09-10 | End: 2024-09-10

## 2024-09-10 RX ORDER — METOCLOPRAMIDE HYDROCHLORIDE 5 MG/ML
5 INJECTION INTRAMUSCULAR; INTRAVENOUS EVERY 8 HOURS PRN
Status: DISCONTINUED | OUTPATIENT
Start: 2024-09-10 | End: 2024-09-12

## 2024-09-10 RX ORDER — SODIUM PHOSPHATE, DIBASIC AND SODIUM PHOSPHATE, MONOBASIC 7; 19 G/230ML; G/230ML
1 ENEMA RECTAL ONCE AS NEEDED
Status: DISCONTINUED | OUTPATIENT
Start: 2024-09-10 | End: 2024-09-12

## 2024-09-10 NOTE — ED QUICK NOTES
To ER for eval of midsternal chest pain that started at 1430,that last for a short period of time. He denies any shortness of breath/n/v. The monitor was applied with a paced rhythm present. The lungs are clear to ausc. Mild peripheral edema is noted with the right sl > left. A 20g Heplock was inserted to the right forearm and labs were drawn and sent. A 12 lead EKG was done.

## 2024-09-10 NOTE — ED INITIAL ASSESSMENT (HPI)
Pt with mid sternal chest tightness since approximately today. Denies RAFY, has pacemaker placed  
fall precautions/surgical precautions

## 2024-09-11 ENCOUNTER — APPOINTMENT (OUTPATIENT)
Dept: CV DIAGNOSTICS | Facility: HOSPITAL | Age: 74
End: 2024-09-11
Attending: HOSPITALIST
Payer: MEDICARE

## 2024-09-11 LAB
ANION GAP SERPL CALC-SCNC: 6 MMOL/L (ref 0–18)
ATRIAL RATE: 84 BPM
BUN BLD-MCNC: 14 MG/DL (ref 9–23)
CALCIUM BLD-MCNC: 9 MG/DL (ref 8.7–10.4)
CHLORIDE SERPL-SCNC: 106 MMOL/L (ref 98–112)
CO2 SERPL-SCNC: 26 MMOL/L (ref 21–32)
CREAT BLD-MCNC: 1.19 MG/DL
EGFRCR SERPLBLD CKD-EPI 2021: 63 ML/MIN/1.73M2 (ref 60–?)
EST. AVERAGE GLUCOSE BLD GHB EST-MCNC: 134 MG/DL (ref 68–126)
GLUCOSE BLD-MCNC: 108 MG/DL (ref 70–99)
GLUCOSE BLD-MCNC: 113 MG/DL (ref 70–99)
GLUCOSE BLD-MCNC: 115 MG/DL (ref 70–99)
GLUCOSE BLD-MCNC: 144 MG/DL (ref 70–99)
GLUCOSE BLD-MCNC: 96 MG/DL (ref 70–99)
HBA1C MFR BLD: 6.3 % (ref ?–5.7)
MAGNESIUM SERPL-MCNC: 2.2 MG/DL (ref 1.6–2.6)
OSMOLALITY SERPL CALC.SUM OF ELEC: 287 MOSM/KG (ref 275–295)
P AXIS: 52 DEGREES
P-R INTERVAL: 240 MS
POTASSIUM SERPL-SCNC: 4.2 MMOL/L (ref 3.5–5.1)
Q-T INTERVAL: 444 MS
QRS DURATION: 176 MS
QTC CALCULATION (BEZET): 524 MS
R AXIS: -84 DEGREES
SODIUM SERPL-SCNC: 138 MMOL/L (ref 136–145)
T AXIS: 79 DEGREES
TROPONIN I SERPL HS-MCNC: 5 NG/L
VENTRICULAR RATE: 84 BPM

## 2024-09-11 PROCEDURE — 78452 HT MUSCLE IMAGE SPECT MULT: CPT | Performed by: HOSPITALIST

## 2024-09-11 PROCEDURE — 93018 CV STRESS TEST I&R ONLY: CPT | Performed by: HOSPITALIST

## 2024-09-11 PROCEDURE — 93017 CV STRESS TEST TRACING ONLY: CPT | Performed by: HOSPITALIST

## 2024-09-11 PROCEDURE — 93306 TTE W/DOPPLER COMPLETE: CPT | Performed by: HOSPITALIST

## 2024-09-11 PROCEDURE — 99232 SBSQ HOSP IP/OBS MODERATE 35: CPT | Performed by: HOSPITALIST

## 2024-09-11 RX ORDER — METOPROLOL TARTRATE 100 MG
100 TABLET ORAL ONCE AS NEEDED
Status: DISCONTINUED | OUTPATIENT
Start: 2024-09-12 | End: 2024-09-12

## 2024-09-11 RX ORDER — REGADENOSON 0.08 MG/ML
INJECTION, SOLUTION INTRAVENOUS
Status: COMPLETED
Start: 2024-09-11 | End: 2024-09-11

## 2024-09-11 RX ORDER — METOPROLOL TARTRATE 50 MG
50 TABLET ORAL ONCE
Status: COMPLETED | OUTPATIENT
Start: 2024-09-12 | End: 2024-09-12

## 2024-09-11 RX ORDER — METOPROLOL TARTRATE 50 MG
50 TABLET ORAL ONCE
Status: COMPLETED | OUTPATIENT
Start: 2024-09-11 | End: 2024-09-11

## 2024-09-11 RX ORDER — METOPROLOL TARTRATE 100 MG
100 TABLET ORAL ONCE
Status: COMPLETED | OUTPATIENT
Start: 2024-09-12 | End: 2024-09-12

## 2024-09-11 RX ORDER — METOPROLOL TARTRATE 50 MG
50 TABLET ORAL ONCE AS NEEDED
Status: COMPLETED | OUTPATIENT
Start: 2024-09-11 | End: 2024-09-12

## 2024-09-11 RX ORDER — METOPROLOL TARTRATE 100 MG
100 TABLET ORAL ONCE
Status: COMPLETED | OUTPATIENT
Start: 2024-09-11 | End: 2024-09-11

## 2024-09-11 RX ORDER — METOPROLOL TARTRATE 50 MG
50 TABLET ORAL ONCE AS NEEDED
Status: DISCONTINUED | OUTPATIENT
Start: 2024-09-12 | End: 2024-09-12

## 2024-09-11 RX ORDER — METOPROLOL TARTRATE 100 MG
100 TABLET ORAL ONCE AS NEEDED
Status: COMPLETED | OUTPATIENT
Start: 2024-09-11 | End: 2024-09-12

## 2024-09-11 RX ORDER — ROSUVASTATIN CALCIUM 10 MG/1
10 TABLET, COATED ORAL NIGHTLY
Status: DISCONTINUED | OUTPATIENT
Start: 2024-09-11 | End: 2024-09-12

## 2024-09-11 RX ORDER — METOPROLOL TARTRATE 100 MG
100 TABLET ORAL ONCE AS NEEDED
Status: ACTIVE | OUTPATIENT
Start: 2024-09-11 | End: 2024-09-11

## 2024-09-11 RX ORDER — FAMOTIDINE 20 MG/1
20 TABLET, FILM COATED ORAL 2 TIMES DAILY PRN
Qty: 30 TABLET | Refills: 0 | Status: SHIPPED | OUTPATIENT
Start: 2024-09-11

## 2024-09-11 RX ORDER — METOPROLOL TARTRATE 50 MG
50 TABLET ORAL ONCE AS NEEDED
Status: ACTIVE | OUTPATIENT
Start: 2024-09-11 | End: 2024-09-11

## 2024-09-11 RX ORDER — BRIMONIDINE TARTRATE 2 MG/ML
1 SOLUTION/ DROPS OPHTHALMIC EVERY 12 HOURS SCHEDULED
Status: DISCONTINUED | OUTPATIENT
Start: 2024-09-11 | End: 2024-09-12

## 2024-09-11 RX ORDER — LATANOPROST 50 UG/ML
1 SOLUTION/ DROPS OPHTHALMIC NIGHTLY
Status: DISCONTINUED | OUTPATIENT
Start: 2024-09-11 | End: 2024-09-12

## 2024-09-11 NOTE — HISTORICAL OFFICE NOTE
[facility]  Outside Information  Continuity of Care Document  12/27/2023  Nasir Pryor - 73 y.o. Male; born [birth year]Summary of episode note, generated on Jan. 29, 2024January 29, 2024   CHIEF COMPLAINT    CHIEF COMPLAINT  Reason for Visit/Chief Complaint   Annual follow up   73-year-old gentleman with a history of pacemaker, transferring his care to our office.He has a medtronic ppm, implanted in 2018 for syncope. ECG demonstrates sinus rhythm, ventricular pacing.Device interrogation today demonstrates normal pacing, sensing, function. No AF, underlying CHB,     PROBLEMS  Reconcile with Patient's ChartPROBLEMS  Problem Effective Dates Date resolved Problem Status   Vertigo, [SNOMED-CT: 634213721] 7/18/2019 - Active   Carotid disease, bilateral, [SNOMED-CT: 077528983] 7/18/2019 - Active   Meniere's disease, [SNOMED-CT: 33693657] 7/18/2019 - Active   Presence of cardiac pacemaker, [SNOMED-CT: 532502832] 7/18/2019 - Active   PVD (peripheral vascular disease), [SNOMED-CT: 643224688] 7/18/2019 - Active   RBBB (right bundle branch block with left anterior fascicular block), [SNOMED-CT: 02191828] 7/18/2019 - Active   Diet-controlled type 2 diabetes mellitus, [SNOMED-CT: 427396011590952] 2/13/2020 - Active     ENCOUNTER DIAGNOSIS    ENCOUNTER DIAGNOSIS  Problem Effective Dates Date resolved Problem Status   Hypertension (HTN), primary, [SNOMED-CT: 66727801] 7/13/2022 - Active   Cardiac pacemaker (s/p PPM), [SNOMED-CT: 038678145] 7/13/2022 - Active   Presence of cardiac pacemaker, [SNOMED-CT: 781112533] 7/18/2019 - Active     VITAL SIGNS    VITAL SIGNS  Date / Time: 12/27/2023   BP Systolic 121 mmHg   BP Diastolic 62 mmHg   Height 68 inches   Weight 216 lbs   Pulse Rate 86 bpm   BSA (Body Surface Area) 2.2 cc/m2   BMI (Body Mass Index) 32.8 cc/m2   Blood Pressure 121 / 62 mmHg     PHYSICAL EXAMINATION    PHYSICAL EXAMINATION  Header Details   Vitals Right Arm Sitting  / 62 mmHg,  Pulse rate 86 bpm, Regular, Height in 5' 8\", BMI: 32.8, Weight in 216.01 lbs (or) 97.98 kgs, BSA : 2.2 cc/m²   General Appearance No Acute Distress   Head/Eyes/Ears/Nose/Mouth/Throat Conjunctiva pink, Sclera Clear, Mucous membranes Moist   Neck Normal carotid pulsations, No carotid bruits, No JVD   Respiratory Unlabored, Lungs clear with normal breath sounds, Equal bilaterally   Cardiovascular Intact distal pulses, Regular rhythm. Normal rate present. Normal and normal S1 and S2     ALLERGIES, ADVERSE REACTIONS, ALERTS    No data available    MEDICATIONS ADMINISTERED DURING VISIT    No data available    MEDICATIONS  Reconcile with Patient's ChartMEDICATIONS  Medication Start Date Route/Frequency Status   albuterol (PROAIR HFA) 108 (90 Base) MCG/ACT Inhalation Aero Soln, [RxNorm: 012774] 7/12/2022 Inhale 2 puffs into the lungs every 4 (four) hours as needed for Wheezing. Active   latanoprost 0.005 % Ophthalmic Solution, [RxNorm: 473610] 7/12/2022 Place 1 drop into both eyes nightly. Active   Multiple Vitamins-Minerals (PRESERVISION AREDS 2) Oral Cap, [RxNorm: 0] 7/12/2022 take two tablets by mouth daily Active   triamterene-hydroCHLOROthiazide 37.5-25 MG Oral Cap, [RxNorm: 734560] 7/12/2022 Take 1 capsule by mouth every other day. Active     ASSESSMENT    73-year-old gentleman with chronic but stable hypertension, complete heart block with a dual-chamber pacemaker.Recent 2D echo (2022) demonstrates normal left ventricular function with chronic right ventricular pacing. A stress test is negative for underlying ischemia.-Device function stable, continue routine follow up in device clinic. Medtronic, ~4 years battery.  - BP is stable, continue current medications.  -2D echo to evaluate LV function prior to next visit. .  - Follow up in a year or sooner if needed.    FAMILY HISTORY    No data available    GENERAL STATUS    No data available    PAST MEDICAL HISTORY    PAST MEDICAL HISTORY  Problem Date diagonsed Date  resolved Status   Vertigo, [SNOMED-CT: 553973151] 7/18/2019 - Active   Carotid disease, bilateral, [SNOMED-CT: 260020538] 7/18/2019 - Active   Meniere's disease, [SNOMED-CT: 83228000] 7/18/2019 - Active   Presence of cardiac pacemaker, [SNOMED-CT: 174765034] 7/18/2019 - Active   PVD (peripheral vascular disease), [SNOMED-CT: 520541944] 7/18/2019 - Active   RBBB (right bundle branch block with left anterior fascicular block), [SNOMED-CT: 50145895] 7/18/2019 - Active   Diet-controlled type 2 diabetes mellitus, [SNOMED-CT: 528341941285635] 2/13/2020 - Active     HISTORY OF PRESENT ILLNESS    73-year-old gentleman with a history of pacemaker, transferring his care to our office.He has a medtronic ppm, implanted in 2018 for syncope. ECG demonstrates sinus rhythm, ventricular pacing.Device interrogation today demonstrates normal pacing, sensing, function. No AF, underlying CHB,     IMMUNIZATIONS    No data available    PLAN OF CARE    PLAN OF CARE  Planned Care Date   EKG (electrocardiogram) 1/1/1900   Echocardiography - Complete 1/1/1900   Follow up visit - Flakita Rosales MD 1/1/1900     PROCEDURES    No data available    RESULTS    RESULTS  Name Result Date Location - Ordered By   Nuclear PET 1.Stress EKG is non-diagnostic due to baseline ventricular pacing.1.This is a normal perfusion study, no perfusion defects noted. 2.The left ventricular cavity is noted to be normal on the stress studies. The stress left ventricular ejection fraction was calculated to be 71% and left ventricular global function is normal. The rest left ventricular cavity is noted to be normal. The rest left ventricular ejection fraction was calculated to be 64% and rest left ventricular global function is normal. 9/1/2022 7:30:00 AM Ronak Mistry MD   Trans Thoracic Echocardiogram 1.The study quality is below average. 2.The left ventricle is normal in size. Global left ventricular systolic function is normal. The left ventricular ejection fraction  is 55%. Left ventricular diastolic function is indeterminate. Left ventricular wall thickness is upper normal. No regional wall motion abnormality.3.The right ventricle is normal in size. Right ventricular systolic function is normal. A linear echo density is noted in the right ventricle, suggestive of a pacemaker lead. 4.No significant valvular regurgitation or stenosis. 7/15/2022 9:00:00 AM Ty Chi MD     REVIEW OF SYSTEMS    REVIEW OF SYSTEMS  Header Details   Cardiovascular No history of Chest pain, HERNANDEZ, Palpitations, Syncope, PND, Orthopnea, Edema, Claudication     SOCIAL HISTORY    SOCIAL HISTORY  Social History Element Description Effective Dates   Smoking status Former smoker -     FUNCTIONAL STATUS    No data available    MEDICAL EQUIPMENT    No data available    Goals Sections    No data available    REASON FOR REFERRAL           Health Concerns Section    No data available    COGNITIVE/MENTAL STATUS    No data available    Patient Demographics    Patient Demographics  Patient Address Patient Name Communication   [address] [place] [address] Nasir Roya [phone] (Mobile)     Patient Demographics  Language Race / Ethnicity Marital Status   English - Spoken (Preferred) White / Not  or       Document Information    Primary Care Provider Other Service Providers Document Coverage Dates   Flakita Rosales  NPI: [id number]  [phone] (Work)  [address] [place] [id number]  [place] [address]  Interpreting Physicians  [facility]  [phone] (Work)  [address] [place] [id number] Josi Rubalcava  NPI: [id number]  [phone] (Work)  [address] [place] [address]  [place] [address]  Nurses Jan. 02, 2024January 02, 2024      Organization   [facility]  [phone] (Work)  [address] [place] [address]  [id number]  [place] [address]     Encounter Providers Encounter Date    Dec. 27, 2023December 27, 2023     Legal Authenticator    Flakita Rosales  NPI: [id number]  [phone] (Work)  [address] [place] [address], [address] [place], [place] [address]  [place] [address]

## 2024-09-11 NOTE — H&P
[facility]     Nasir Beauchamp Patient Status:  Observation    4/15/1950 MRN [id number]   Location [facility] 8NE-A Attending Darby Whitney,    Hosp Day # 0 PCP Staci Miguel MD     Chief Complaint: Chest pain    Subjective:    History of Present Illness:     Nasir Beauchamp is a 74 year old male with PMHx DM2, HLD, emphysema, obesity presents to hospital today with a chief complaint of chest pain.  Patient reports that this is not going on for the last 24 hours.  He feels intermittent palpitations.  Patient denies any fevers or chills nausea vomiting diarrhea constipation.    -S/p aspirin and cardiology consult in the emergency room    History/Other:    Past Medical History:  Past Medical History:    Abdominal hernia    ALCOHOL USE    1-2 times/wk    Arthritis    Back pain    Bronchitis    Chronic left-sided low back pain    Diabetes (HCC)    diet controlled - dx 2020    Easy bruising    Elevated lipase    Flatulence/gas pain/belching    Gout    Hernia, abdominal    1973 hernia repair.  double hernia repair    Left knee pain    Obesity    Pacemaker    Pneumonia due to organism    Pulmonary emphysema (HCC)    Wears glasses     Past Surgical History:   Past Surgical History:   Procedure Laterality Date    Cardiac pacemaker placement  2018    Cataract      Colonoscopy  2014    Colonoscopy N/A 10/08/2021    Procedure: COLONOSCOPY with cold snare polypectomy;  Surgeon: Stewart Tolentino MD;  Location:  ENDOSCOPY    Dental surgery procedure  2009    dental implants    Hernia surgery Right 1973    Hernia surgery Bilateral     Lasik Bilateral     Other surgical history      Dental Implants 6 years ago    Pacemaker/defibrillator      2017    Repair ing hernia,5+y/o,reducibl      Repair of nasal septum N/A     Tonsillectomy  's    Vasectomy        Family History:   Family History   Problem Relation Age of Onset    Other (Other) Father         dementia     Other (Other) Mother         COPD    Asthma Mother     Pulmonary Disease Mother      Social History:    reports that he quit smoking about 6 years ago. His smoking use included cigarettes. He started smoking about 59 years ago. He has a 52.8 pack-year smoking history. He has been exposed to tobacco smoke. He has never used smokeless tobacco. He reports current alcohol use of about 1.0 standard drink of alcohol per week. He reports that he does not use drugs.     Allergies: No Known Allergies    Medications:    No current facility-administered medications on file prior to encounter.     Current Outpatient Medications on File Prior to Encounter   Medication Sig Dispense Refill    METFORMIN 500 MG Oral Tab TAKE 1 TABLET(500 MG) BY MOUTH DAILY WITH BREAKFAST 90 tablet 0    rosuvastatin 10 MG Oral Tab TAKE 1 TABLET(10 MG) BY MOUTH EVERY NIGHT 90 tablet 1    ipratropium 0.06 % Nasal Solution 1 spray by Nasal route 2 (two) times daily. 1 each 5    fluticasone-umeclidin-vilant (TRELEGY ELLIPTA) 100-62.5-25 MCG/ACT Inhalation Aerosol Powder, Breath Activated Inhale 1 puff into the lungs daily. 3 each 0    Glucose Blood (CONTOUR NEXT TEST) In Vitro Strip TEST ONCE DAILY 100 strip 3    Microlet Lancets Does not apply Misc Test once daily. 100 each 3    Misc Natural Products (GLUCOSAMINE CHONDROITIN ADV) Oral Tab Take 1 tablet by mouth daily.      latanoprost 0.005 % Ophthalmic Solution Place 1 drop into both eyes nightly.      Multiple Vitamins-Minerals (PRESERVISION AREDS 2) Oral Cap take two tablets by mouth daily         Review of Systems:   A comprehensive review of systems was completed.    Pertinent positives and negatives noted in the HPI.    Objective:   Physical Exam:    /66   Pulse 75   Temp 97.5 °F (36.4 °C) (Oral)   Resp 16   Ht 5' 9\" (1.753 m)   Wt 215 lb (97.5 kg)   SpO2 96%   BMI 31.75 kg/m²   General: No acute distress, Alert  Respiratory: No rhonchi, no wheezes  Cardiovascular:  pacedrhythm  Abdomen: Soft, Non-tender, non-distended, positive bowel sounds  Neuro: No new focal deficits  Extremities: No edema      Results:    Labs:      Labs Last 24 Hours:    Recent Labs   Lab 09/10/24  1652   RBC 4.88   HGB 13.1   HCT 39.7   MCV 81.4   MCH 26.8   MCHC 33.0   RDW 16.8   NEPRELIM 7.64   WBC 11.6*   .0       Recent Labs   Lab 09/10/24  1652   *   BUN 18   CREATSERUM 1.23   EGFRCR 61   CA 8.8   ALB 3.6      K 3.9      CO2 25.0   ALKPHO 55   AST 22   ALT 26   BILT 0.4   TP 6.9       Lab Results   Component Value Date    INR 0.97 11/25/2015       Recent Labs   Lab 09/10/24  1652 09/10/24  1827   TROPHS 6 9       No results for input(s): \"TROP\", \"PBNP\" in the last 168 hours.    No results for input(s): \"PCT\" in the last 168 hours.    Imaging: Imaging data reviewed in Epic.    Assessment & Plan:      #Atypical chest pain  #S/p pacemaker placement  -ACS rule out, trend troponins  -Cardiology to see  -tele    #Diabetes mellitus type 2  -PTA: Metformin  -A1c 6.5 as of June 2024  -Insulin sliding scale for now    #Hyperlipidemia    #Obesity  -BMI 31.75    #Emphysema  -PTA: ICS    #ACP  -full code, 17 mins spent face to face w/ patient. We reviewed the meaning of cardiac arrest and the use of acls/cpr. This was a voluntary conversation. All questions were answered. Order updated on epic.         Plan of care discussed with ER physician and patient    Joel Washington DO    Supplementary Documentation:     The 21st Century Cures Act makes medical notes like these available to patients in the interest of transparency. Please be advised this is a medical document. Medical documents are intended to carry relevant information, facts as evident, and the clinical opinion of the practitioner. The medical note is intended as peer to peer communication and may appear blunt or direct. It is written in medical language and may contain abbreviations or verbiage that are unfamiliar.

## 2024-09-11 NOTE — CONSULTS
Cardiology Consultation      Nasir Beauchamp Patient Status:  Observation    4/15/1950 MRN [id number]   [facility] 8NE-A Attending Volodymyr Bragg MD   Hosp Day # 0 PCP Staci Miguel MD     Reason for Consultation:  Chest pain     History of Present Illness:  Nasir Beauchamp is a(n) 74 year old male with chronic medical conditions including PPM 2/2 syncope who presents with chest pain. Described as chest tightness intermittently occurring at rest. It was associated with nausea. Denies shortness of breath, palpitations, syncope, diaphoresis. No other complaints. Cardiology asked to evaluate.      History:  Past Medical History:    Abdominal hernia    ALCOHOL USE    1-2 times/wk    Arthritis    Back pain    Bronchitis    Chronic left-sided low back pain    Diabetes (HCC)    diet controlled - dx 2020    Easy bruising    Elevated lipase    Flatulence/gas pain/belching    Gout    Hernia, abdominal    1973 hernia repair.  double hernia repair    Left knee pain    Obesity    Pacemaker    Pneumonia due to organism    Pulmonary emphysema (HCC)    Wears glasses     Past Surgical History:   Procedure Laterality Date    Cardiac pacemaker placement  2018    Cataract      Colonoscopy  2014    Colonoscopy N/A 10/08/2021    Procedure: COLONOSCOPY with cold snare polypectomy;  Surgeon: Stewart Tolentino MD;  Location:  ENDOSCOPY    Dental surgery procedure  2009    dental implants    Hernia surgery Right 1973    Hernia surgery Bilateral     Lasik Bilateral     Other surgical history      Dental Implants 6 years ago    Pacemaker/defibrillator      2017    Repair ing hernia,5+y/o,reducibl      Repair of nasal septum N/A     Tonsillectomy  1950's    Vasectomy  1978     Family History   Problem Relation Age of Onset    Other (Other) Father         dementia    Other (Other) Mother         COPD    Asthma Mother     Pulmonary Disease Mother       reports that he quit smoking about 6 years ago. His  smoking use included cigarettes. He started smoking about 59 years ago. He has a 52.8 pack-year smoking history. He has been exposed to tobacco smoke. He has never used smokeless tobacco. He reports current alcohol use of about 1.0 standard drink of alcohol per week. He reports that he does not use drugs.    Allergies:  No Known Allergies    Medications:    Current Facility-Administered Medications:     brimonidine (Alphagan) 0.2 % ophthalmic solution 1 drop, 1 drop, Right Eye, 2 times per day    latanoprost (Xalatan) 0.005 % ophthalmic solution 1 drop, 1 drop, Both Eyes, Nightly    rosuvastatin (Crestor) tab 10 mg, 10 mg, Oral, Nightly    glucose (Dex4) 15 GM/59ML oral liquid 15 g, 15 g, Oral, Q15 Min PRN **OR** glucose (Glutose) 40% oral gel 15 g, 15 g, Oral, Q15 Min PRN **OR** glucose-vitamin C (Dex-4) chewable tab 4 tablet, 4 tablet, Oral, Q15 Min PRN **OR** dextrose 50% injection 50 mL, 50 mL, Intravenous, Q15 Min PRN **OR** glucose (Dex4) 15 GM/59ML oral liquid 30 g, 30 g, Oral, Q15 Min PRN **OR** glucose (Glutose) 40% oral gel 30 g, 30 g, Oral, Q15 Min PRN **OR** glucose-vitamin C (Dex-4) chewable tab 8 tablet, 8 tablet, Oral, Q15 Min PRN    insulin aspart (NovoLOG) 100 Units/mL FlexPen 1-10 Units, 1-10 Units, Subcutaneous, TID AC and HS    acetaminophen (Tylenol Extra Strength) tab 500 mg, 500 mg, Oral, Q4H PRN    melatonin tab 3 mg, 3 mg, Oral, Nightly PRN    ondansetron (Zofran) 4 MG/2ML injection 4 mg, 4 mg, Intravenous, Q6H PRN    heparin (Porcine) 5000 UNIT/ML injection 5,000 Units, 5,000 Units, Subcutaneous, 2 times per day    polyethylene glycol (PEG 3350) (Miralax) 17 g oral packet 17 g, 17 g, Oral, Daily PRN    sennosides (Senokot) tab 17.2 mg, 17.2 mg, Oral, Nightly PRN    bisacodyl (Dulcolax) 10 MG rectal suppository 10 mg, 10 mg, Rectal, Daily PRN    fleet enema (Fleet) rectal enema 133 mL, 1 enema, Rectal, Once PRN    metoclopramide (Reglan) 5 mg/mL injection 5 mg, 5 mg, Intravenous, Q8H  PRN    Review of Systems:  A comprehensive review of systems was negative if not otherwise mention in above HPI.    /67 (BP Location: Right arm)   Pulse 68   Temp 97.7 °F (36.5 °C) (Oral)   Resp 20   Ht 5' 9\" (1.753 m)   Wt 218 lb 0.6 oz (98.9 kg)   SpO2 99%   BMI 32.20 kg/m²   Temp (24hrs), Av.8 °F (36.6 °C), Min:97.5 °F (36.4 °C), Max:98 °F (36.7 °C)       Intake/Output Summary (Last 24 hours) at 2024 1010  Last data filed at 2024 0750  Gross per 24 hour   Intake 240 ml   Output 2 ml   Net 238 ml     Wt Readings from Last 3 Encounters:   09/10/24 218 lb 0.6 oz (98.9 kg)   24 222 lb (100.7 kg)   24 222 lb 6.4 oz (100.9 kg)       Physical Exam:   General: Alert and oriented x 3. No apparent distress. No respiratory or constitutional distress. Obese.   HEENT: Normocephalic, anicteric sclera, neck supple.  Neck: Supple.   Cardiac: Regular rate and rhythm. S1, S2 normal. No murmur, pericardial rub, S3.  Lungs: Clear without wheezes, rales, rhonchi or dullness.  Normal excursions and effort.  Abdomen: Soft, non-tender. BS-present.  Extremities: Without clubbing, cyanosis or edema.    Neurologic: Alert and oriented, normal affect.  Skin: Warm and dry.     Laboratory Data:  Lab Results   Component Value Date    WBC 11.6 09/10/2024    HGB 13.1 09/10/2024    HCT 39.7 09/10/2024    .0 09/10/2024    CREATSERUM 1.19 2024    BUN 14 2024     2024    K 4.2 2024     2024    CO2 26.0 2024     2024    CA 9.0 2024    ALB 3.6 09/10/2024    ALKPHO 55 09/10/2024    BILT 0.4 09/10/2024    TP 6.9 09/10/2024    AST 22 09/10/2024    ALT 26 09/10/2024    DDIMER 0.49 09/10/2024    MG 2.2 2024    PGLU 113 2024       Imaging/results:  HS troponin - 6, 9, 5, 5  EKG - A sensed, V paced   Ddimer 0.49  CXR - no cardiopulm findings      Assessment:  Chest pain, suspect less likely typical   Syncope s/p PPM    DM2  HLD      Plan:  [facility] SPECT  ECHO  Further recs to follow        Thank you for allowing me to participate in the care of your patient.      Bakari Stanton DO  Cardiologist  [facility]  9/11/2024 10:10 AM      Note to the patient: The 21st Century Cures Act makes medical notes like these available to patients in the interest of transparency. However, be advised that this is a medical document. It is intended as peer to peer communication. It is written in medical language and may contain abbreviations or verbiage that are unfamiliar. It may appear blunt or direct. Medical documents are intended to carry relevant information, facts as evident, and clinical opinion of the practitioner.     Disclaimer: Components of this note were documented using voice recognition system and are subject to errors not corrected at proofreading. Contact the author of this note for any clarifications.

## 2024-09-11 NOTE — ED QUICK NOTES
Charge ARIANA Corona aware that EMS is at patient's bedside for transfer to [place] for admission.

## 2024-09-11 NOTE — PROGRESS NOTES
09/10/24 2103 09/10/24 2105 09/10/24 2106   Vital Signs   Temp  --  97.7 °F (36.5 °C) 97.7 °F (36.5 °C)   Temp src Oral Oral Oral   Pulse 73 69 73   Heart Rate Source Monitor Monitor Monitor   Resp 15 18 22   Respiratory Quality Normal Normal Normal   /77 134/74 139/63   MAP (mmHg) 96 92 84   BP Location Right arm Right arm Right arm   BP Method Automatic Automatic Automatic   Patient Position Lying Sitting Standing

## 2024-09-11 NOTE — PLAN OF CARE
NURSING ADMISSION NOTE      Patient admitted via Ambulance  Oriented to room.  Safety precautions initiated.  Bed in low position.  Call light in reach.      Patient from  ED. Admission navigators completed. Patient alert and oriented x 4. Maintaining O2 saturation WNL on room air. V-paced on tele monitor. Skin intact. Patient ambulating up at jane. Continent of bladder and bowel. Patient aware of plan of care. Safety precaution sin place, call light within reach. All needs met at this time.

## 2024-09-11 NOTE — PLAN OF CARE
Assumed care of patient at 0730. Patient is A/Ox4 on RA. Denies any pain and or shortness of breath. Cardiac wise he is V-paced. Continent of both bowel and bladder. Last bm-9/10. Activity wise he is up at jane. Patient was updated on plan of care. No questions or concerns at this time.     Problem: Diabetes/Glucose Control  Goal: Glucose maintained within prescribed range  Description: INTERVENTIONS:  - Monitor Blood Glucose as ordered  - Assess for signs and symptoms of hyperglycemia and hypoglycemia  - Administer ordered medications to maintain glucose within target range  - Assess barriers to adequate nutritional intake and initiate nutrition consult as needed  - Instruct patient on self management of diabetes  Outcome: Progressing     Problem: Patient/Family Goals  Goal: Patient/Family Long Term Goal  Description: Patient's Long Term Goal: stay out of the hospital    Interventions:  - take meds as prescribe, stick to follow up appointments   - See additional Care Plan goals for specific interventions  Outcome: Progressing  Goal: Patient/Family Short Term Goal  Description: Patient's Short Term Goal: chest pain free    Interventions:   - take cardiac meds as prescribed  - See additional Care Plan goals for specific interventions  Outcome: Progressing

## 2024-09-11 NOTE — PROGRESS NOTES
CARDIODIAGNOSTIC PRELIMINARY REPORT:     LEXISCAN completed, tolerated well    Second set of images pending

## 2024-09-11 NOTE — ED QUICK NOTES
Orders for admission, patient is aware of plan and ready to go upstairs. Any questions, please call ED RN Shell at extension [phone].     Patient Covid vaccination status: Fully vaccinated     COVID Test Ordered in ED: None    COVID Suspicion at Admission: N/A    Running Infusions:  None    Mental Status/LOC at time of transport: Ao x3    Other pertinent information:     CIWA score: N/A   NIH score:  N/A

## 2024-09-12 ENCOUNTER — APPOINTMENT (OUTPATIENT)
Dept: CT IMAGING | Facility: HOSPITAL | Age: 74
End: 2024-09-12
Attending: NURSE PRACTITIONER
Payer: MEDICARE

## 2024-09-12 VITALS
DIASTOLIC BLOOD PRESSURE: 56 MMHG | BODY MASS INDEX: 32.3 KG/M2 | TEMPERATURE: 98 F | RESPIRATION RATE: 18 BRPM | HEART RATE: 64 BPM | WEIGHT: 218.06 LBS | OXYGEN SATURATION: 98 % | SYSTOLIC BLOOD PRESSURE: 111 MMHG | HEIGHT: 69 IN

## 2024-09-12 LAB
ANION GAP SERPL CALC-SCNC: 7 MMOL/L (ref 0–18)
BUN BLD-MCNC: 18 MG/DL (ref 9–23)
CALCIUM BLD-MCNC: 9.5 MG/DL (ref 8.7–10.4)
CHLORIDE SERPL-SCNC: 102 MMOL/L (ref 98–112)
CO2 SERPL-SCNC: 28 MMOL/L (ref 21–32)
CREAT BLD-MCNC: 1.21 MG/DL
EGFRCR SERPLBLD CKD-EPI 2021: 62 ML/MIN/1.73M2 (ref 60–?)
GLUCOSE BLD-MCNC: 107 MG/DL (ref 70–99)
GLUCOSE BLD-MCNC: 111 MG/DL (ref 70–99)
GLUCOSE BLD-MCNC: 178 MG/DL (ref 70–99)
OSMOLALITY SERPL CALC.SUM OF ELEC: 290 MOSM/KG (ref 275–295)
POTASSIUM SERPL-SCNC: 4.3 MMOL/L (ref 3.5–5.1)
SODIUM SERPL-SCNC: 137 MMOL/L (ref 136–145)

## 2024-09-12 PROCEDURE — 99239 HOSP IP/OBS DSCHRG MGMT >30: CPT | Performed by: HOSPITALIST

## 2024-09-12 PROCEDURE — 75574 CT ANGIO HRT W/3D IMAGE: CPT | Performed by: NURSE PRACTITIONER

## 2024-09-12 RX ORDER — METOPROLOL TARTRATE 1 MG/ML
5 INJECTION, SOLUTION INTRAVENOUS SEE ADMIN INSTRUCTIONS
Status: DISCONTINUED | OUTPATIENT
Start: 2024-09-12 | End: 2024-09-12

## 2024-09-12 RX ORDER — ASPIRIN 81 MG/1
81 TABLET ORAL DAILY
Status: DISCONTINUED | OUTPATIENT
Start: 2024-09-12 | End: 2024-09-12

## 2024-09-12 RX ORDER — NITROGLYCERIN 0.4 MG/1
0.4 TABLET SUBLINGUAL ONCE
Status: COMPLETED | OUTPATIENT
Start: 2024-09-12 | End: 2024-09-12

## 2024-09-12 RX ORDER — NITROGLYCERIN 0.4 MG/1
TABLET SUBLINGUAL
Status: COMPLETED
Start: 2024-09-12 | End: 2024-09-12

## 2024-09-12 RX ORDER — ASPIRIN 81 MG/1
81 TABLET ORAL DAILY
Qty: 90 TABLET | Refills: 3 | Status: SHIPPED | OUTPATIENT
Start: 2024-09-12

## 2024-09-12 RX ORDER — DILTIAZEM HYDROCHLORIDE 5 MG/ML
5 INJECTION INTRAVENOUS SEE ADMIN INSTRUCTIONS
Status: DISCONTINUED | OUTPATIENT
Start: 2024-09-12 | End: 2024-09-12

## 2024-09-12 NOTE — PLAN OF CARE
Patient cleared for discharge by primary and consults. IV removed, tele monitor discontinued. Discharge paperwork/information given, including medications and follow-up appointments. All questions answered. All belongings sent with patient. Transported off unit via wheelchair.    Problem: Diabetes/Glucose Control  Goal: Glucose maintained within prescribed range  Description: INTERVENTIONS:  - Monitor Blood Glucose as ordered  - Assess for signs and symptoms of hyperglycemia and hypoglycemia  - Administer ordered medications to maintain glucose within target range  - Assess barriers to adequate nutritional intake and initiate nutrition consult as needed  - Instruct patient on self management of diabetes  9/12/2024 1647 by Neyda Vidal RN  Outcome: Completed  9/12/2024 1039 by Neyda Vidal RN  Outcome: Progressing     Problem: Patient/Family Goals  Goal: Patient/Family Long Term Goal  Description: Patient's Long Term Goal: Stay out of hospital    Interventions:  - Follow up with PCP after discharge  - Take medication as prescribed  - See additional Care Plan goals for specific interventions  9/12/2024 1647 by Neyda Vidal RN  Outcome: Completed  9/12/2024 1039 by Neyda Vidal RN  Outcome: Progressing  Goal: Patient/Family Short Term Goal  Description: Patient's Short Term Goal: Feel better and go home    Interventions:   - Test ordered  - Monitor labs  - Monitor on tele  - See additional Care Plan goals for specific interventions  9/12/2024 1647 by Neyda Vidal RN  Outcome: Completed  9/12/2024 1039 by Neyda Vidal RN  Outcome: Progressing     Problem: CARDIOVASCULAR - ADULT  Goal: Maintains optimal cardiac output and hemodynamic stability  Description: INTERVENTIONS:  - Monitor vital signs, rhythm, and trends  - Monitor for bleeding, hypotension and signs of decreased cardiac output  - Evaluate effectiveness of vasoactive medications to optimize hemodynamic stability  -  Monitor arterial and/or venous puncture sites for bleeding and/or hematoma  - Assess quality of pulses, skin color and temperature  - Assess for signs of decreased coronary artery perfusion - ex. Angina  - Evaluate fluid balance, assess for edema, trend weights  9/12/2024 1647 by Neyda Vidal RN  Outcome: Completed  9/12/2024 1039 by Neyda Vidal RN  Outcome: Progressing  Goal: Absence of cardiac arrhythmias or at baseline  Description: INTERVENTIONS:  - Continuous cardiac monitoring, monitor vital signs, obtain 12 lead EKG if indicated  - Evaluate effectiveness of antiarrhythmic and heart rate control medications as ordered  - Initiate emergency measures for life threatening arrhythmias  - Monitor electrolytes and administer replacement therapy as ordered  9/12/2024 1647 by Neyda Vidal RN  Outcome: Completed  9/12/2024 1039 by Neyda Vidal RN  Outcome: Progressing

## 2024-09-12 NOTE — IMAGING NOTE
Pt arrives to room CT 4 at 13:42. Working with CT tech MILDRED Blackman. IV established by ARIANA Faye to right forearm with 20 gauge angiocath. Pt denies long acting nitrates. Pt positioned on CT table comfortably. Procedure explained and questions answered. O2 applied via NC at 2 LPM. VSS as noted in flowsheet.     GFR = 62   imaging started at 13:54    0.9NS flush followed by Omnipaque contrast at 14:01    omnipaque contrast = 85 mL  0.9NS = 60 mL  Average HR = 78    Pt tolerated procedure without complication. Denies s/sx of contrast reaction.  Pt A/O x 4 and denies pain. Ambulatory and in stable condition. Dc'd back to room 86 at 14:03. SBAR called to ARIANA Faye.

## 2024-09-12 NOTE — PLAN OF CARE
CCTA reassuring with mild nonobstructive CAD    - Start Aspirin 81 mg daily  - Continue crestor. LDL 37 on recent labs.  - Ok to discharge from cardiac standpoint. Will arrange outpatient follow-up. Discussed case with Dr Stanton.

## 2024-09-12 NOTE — PLAN OF CARE
Assumed care of pt at 0730. A/ox4, rm air, AV paced on tele. No reports of pain. Breath sounds clear upon auscultation. Denies cough. Impaired vision - wears glasses. IV placed as close to AC as possible for gated CTA today. Also notified patient that he cannot have caffeine prior to procedure.    Discussed POC w/ pt.    Problem: Diabetes/Glucose Control  Goal: Glucose maintained within prescribed range  Description: INTERVENTIONS:  - Monitor Blood Glucose as ordered  - Assess for signs and symptoms of hyperglycemia and hypoglycemia  - Administer ordered medications to maintain glucose within target range  - Assess barriers to adequate nutritional intake and initiate nutrition consult as needed  - Instruct patient on self management of diabetes  Outcome: Progressing     Problem: Patient/Family Goals  Goal: Patient/Family Long Term Goal  Description: Patient's Long Term Goal: Stay out of hospital    Interventions:  - Follow up with PCP after discharge  - Take medication as prescribed  - See additional Care Plan goals for specific interventions  Outcome: Progressing  Goal: Patient/Family Short Term Goal  Description: Patient's Short Term Goal: Feel better and go home    Interventions:   - Test ordered  - Monitor labs  - Monitor on tele  - See additional Care Plan goals for specific interventions  Outcome: Progressing     Problem: CARDIOVASCULAR - ADULT  Goal: Maintains optimal cardiac output and hemodynamic stability  Description: INTERVENTIONS:  - Monitor vital signs, rhythm, and trends  - Monitor for bleeding, hypotension and signs of decreased cardiac output  - Evaluate effectiveness of vasoactive medications to optimize hemodynamic stability  - Monitor arterial and/or venous puncture sites for bleeding and/or hematoma  - Assess quality of pulses, skin color and temperature  - Assess for signs of decreased coronary artery perfusion - ex. Angina  - Evaluate fluid balance, assess for edema, trend weights  Outcome:  Progressing  Goal: Absence of cardiac arrhythmias or at baseline  Description: INTERVENTIONS:  - Continuous cardiac monitoring, monitor vital signs, obtain 12 lead EKG if indicated  - Evaluate effectiveness of antiarrhythmic and heart rate control medications as ordered  - Initiate emergency measures for life threatening arrhythmias  - Monitor electrolytes and administer replacement therapy as ordered  Outcome: Progressing

## 2024-09-12 NOTE — PLAN OF CARE
Patient alert and oriented x 4. Up  ad jane. On RA with adequate O2 saturations. AV-paced on tele. Continent of bowel and bladder. No complaints of pain, shortness of breath or chest pain/discomfort. POC : CT angiogram. Fall precautions in place. Call light within reach.    Problem: Diabetes/Glucose Control  Goal: Glucose maintained within prescribed range  Description: INTERVENTIONS:  - Monitor Blood Glucose as ordered  - Assess for signs and symptoms of hyperglycemia and hypoglycemia  - Administer ordered medications to maintain glucose within target range  - Assess barriers to adequate nutritional intake and initiate nutrition consult as needed  - Instruct patient on self management of diabetes  9/12/2024 0059 by Anthony Aecvedo RN  Outcome: Progressing  9/12/2024 0052 by Anthony Acevedo RN  Outcome: Progressing     Problem: Patient/Family Goals  Goal: Patient/Family Long Term Goal  Description: Patient's Long Term Goal: Stay out of hospital    Interventions:  - Follow up with PCP after discharge  - Take medication as prescribed  - See additional Care Plan goals for specific interventions  9/12/2024 0059 by Anthony Acevedo RN  Outcome: Progressing  9/12/2024 0052 by Anthony Acevedo RN  Outcome: Progressing  Goal: Patient/Family Short Term Goal  Description: Patient's Short Term Goal: Feel better and go home    Interventions:   - Test ordered  - Monitor labs  - Monitor on tele  - See additional Care Plan goals for specific interventions  9/12/2024 0059 by Anthony Acevedo RN  Outcome: Progressing  9/12/2024 0052 by Anthony Acevedo RN  Outcome: Progressing     Problem: CARDIOVASCULAR - ADULT  Goal: Maintains optimal cardiac output and hemodynamic stability  Description: INTERVENTIONS:  - Monitor vital signs, rhythm, and trends  - Monitor for bleeding, hypotension and signs of decreased cardiac output  - Evaluate effectiveness of vasoactive medications to optimize hemodynamic stability  - Monitor arterial and/or venous puncture sites  for bleeding and/or hematoma  - Assess quality of pulses, skin color and temperature  - Assess for signs of decreased coronary artery perfusion - ex. Angina  - Evaluate fluid balance, assess for edema, trend weights  Outcome: Progressing  Goal: Absence of cardiac arrhythmias or at baseline  Description: INTERVENTIONS:  - Continuous cardiac monitoring, monitor vital signs, obtain 12 lead EKG if indicated  - Evaluate effectiveness of antiarrhythmic and heart rate control medications as ordered  - Initiate emergency measures for life threatening arrhythmias  - Monitor electrolytes and administer replacement therapy as ordered  Outcome: Progressing

## 2024-09-12 NOTE — PROGRESS NOTES
[facility]   part of [facility]     Hospitalist Progress Note     Nasir Beauchamp Patient Status:  Observation    4/15/1950 MRN [id number]   Location [facility] 8NE-A Attending Volodymyr Bragg MD   Hosp Day # 0 PCP Staci Miguel MD     Chief Complaint: chest pain    Subjective:     Patient feels well.  Symptoms resolved    Objective:    Review of Systems:   ROS completed; pertinent positive and negatives stated in subjective.    Vital signs:  Temp:  [97.6 °F (36.4 °C)-98.1 °F (36.7 °C)] 97.7 °F (36.5 °C)  Pulse:  [62-78] 63  Resp:  [14-20] 14  BP: (111-151)/(63-88) 123/76  SpO2:  [92 %-100 %] 100 %    Physical Exam:    General: No acute distress  Respiratory: Clear to auscultation bilaterally  Cardiovascular: S1, S2.  Abdomen: Soft  Neuro: No new focal deficits      Diagnostic Data:    Labs:  Recent Labs   Lab 09/10/24  1652   WBC 11.6*   HGB 13.1   MCV 81.4   .0       Recent Labs   Lab 09/10/24  1652 24  0544 24  0846   * 108* 178*   BUN 18 14 18   CREATSERUM 1.23 1.19 1.21   CA 8.8 9.0 9.5   ALB 3.6  --   --     138 137   K 3.9 4.2 4.3    106 102   CO2 25.0 26.0 28.0   ALKPHO 55  --   --    AST 22  --   --    ALT 26  --   --    BILT 0.4  --   --    TP 6.9  --   --        Estimated Creatinine Clearance: 51.9 mL/min (based on SCr of 1.21 mg/dL).     Recent Labs   Lab 09/10/24  1827 09/10/24  2117 24  0544   TROPHS 9 5 5       No results for input(s): \"PTP\", \"INR\" in the last 168 hours.           Imaging: Imaging data reviewed in Epic.    Medications:    brimonidine  1 drop Right Eye 2 times per day    latanoprost  1 drop Both Eyes Nightly    rosuvastatin  10 mg Oral Nightly    insulin aspart  1-10 Units Subcutaneous TID AC and HS    heparin  5,000 Units Subcutaneous 2 times per day       Assessment & Plan:     #Chest pain  ST  Echo  Top neg    #DM2  Insulin    #HLD    #Emphysema  ICS        Volodymyr Bragg MD    Supplementary Documentation:      Quality:    DVT Mechanical Prophylaxis:   SCDs,    DVT Pharmacologic Prophylaxis   Medication    heparin (Porcine) 5000 UNIT/ML injection 5,000 Units                Code Status: Full Code  Hess: No urinary catheter in place  Hess Duration (in days):   Central line:    RADHA: 9/13/2024      Discharge is dependent on: clinical stability  At this point Mr. Beauchamp is expected to be discharge to: home    The 21st Century Cures Act makes medical notes like these available to patients in the interest of transparency. Please be advised this is a medical document. Medical documents are intended to carry relevant information, facts as evident, and the clinical opinion of the practitioner. The medical note is intended as peer to peer communication and may appear blunt or direct. It is written in medical language and may contain abbreviations or verbiage that are unfamiliar.

## 2024-09-12 NOTE — PROGRESS NOTES
Patient seen and examined.      Gen: NAD  Resp: CTA  CVS: s1s2  Abd: soft, +bowel sounds  Neck: trachea is midline    A/P:    Chest pain: discussed with cards. ST with old inf wall infarct.  gCTA today.  Possible DC later       Total minutes spent on discharge plannin      Volodymyr Bragg MD

## 2024-09-12 NOTE — ED PROVIDER NOTES
Patient Seen in: [facility] 8ne-a      History     Chief Complaint   Patient presents with    Chest Pain Angina     Stated Complaint: chest pain since  1430    Subjective:   HPI    This is a 74-year-old gentleman, history of pacemaker, here for evaluation of chest discomfort.  States he was not feeling well all throughout the evening yesterday reported mild nausea, no other focal symptoms.  Then earlier this afternoon around 2:30 PM developed bloody describes as chest tightness symptoms lasted for about 30 minutes reports mild associated nausea.  Symptoms resolved on their own were not exertional in nature.  Perhaps an hour after that had another 20-minute to 30-minute episode of chest tightness again nonexertional resolved on its own no chest discomfort at present no shortness of breath no recent change in his exercise tolerance leg swelling cough cold symptoms fevers any other complaints or concerns.    Objective:   Past Medical History:    Abdominal hernia    ALCOHOL USE    1-2 times/wk    Arthritis    Back pain    Bronchitis    Chronic left-sided low back pain    Diabetes (HCC)    diet controlled - dx 2/2020    Easy bruising    Elevated lipase    Flatulence/gas pain/belching    Gout    Hernia, abdominal    1973 hernia repair. 2001 double hernia repair    Left knee pain    Obesity    Pacemaker    Pneumonia due to organism    Pulmonary emphysema (HCC)    Wears glasses              Past Surgical History:   Procedure Laterality Date    Cardiac pacemaker placement  2018    Cataract      Colonoscopy  2014    Colonoscopy N/A 10/08/2021    Procedure: COLONOSCOPY with cold snare polypectomy;  Surgeon: Stewart Tolentino MD;  Location:  ENDOSCOPY    Dental surgery procedure  2009    dental implants    Hernia surgery Right 1973    Hernia surgery Bilateral 2001    Lasik Bilateral 2003    Other surgical history      Dental Implants 6 years ago    Pacemaker/defibrillator      2017    Repair ing hernia,5+y/o,reducibl       Repair of nasal septum N/A     Tonsillectomy  's    Vasectomy                  Social History     Socioeconomic History    Marital status:    Tobacco Use    Smoking status: Former     Current packs/day: 0.00     Average packs/day: 1 pack/day for 52.8 years (52.8 ttl pk-yrs)     Types: Cigarettes     Start date: 1965     Quit date: 10/30/2017     Years since quittin.8     Passive exposure: Past (pt's parents both smoked in the home)    Smokeless tobacco: Never   Vaping Use    Vaping status: Never Used   Substance and Sexual Activity    Alcohol use: Yes     Alcohol/week: 1.0 standard drink of alcohol     Types: 1 Standard drinks or equivalent per week     Comment: 1 drink weekly    Drug use: Never   Other Topics Concern    Caffeine Concern No    Exercise No    Seat Belt No    Special Diet No    Stress Concern No    Weight Concern No     Social Determinants of Health     Food Insecurity: Unknown (9/10/2024)    Food Insecurity     Food Insecurity: Patient declined   Transportation Needs: No Transportation Needs (9/10/2024)    Transportation Needs     Lack of Transportation: No   Housing Stability: Low Risk  (9/10/2024)    Housing Stability     Housing Instability: No              Review of Systems    Positive for stated Chief Complaint: Chest Pain Angina    Other systems are as noted in HPI.  Constitutional and vital signs reviewed.      All other systems reviewed and negative except as noted above.    Physical Exam     ED Triage Vitals [09/10/24 1641]   /72   Pulse 81   Resp 19   Temp 97.5 °F (36.4 °C)   Temp src Oral   SpO2 99 %   O2 Device None (Room air)       Current Vitals:   Vital Signs  BP: 128/69  Pulse: 78  Resp: 20  Temp: 97.6 °F (36.4 °C)  Temp src: Oral  MAP (mmHg): 87    Oxygen Therapy  SpO2: 93 %  O2 Device: None (Room air)  O2 Flow Rate (L/min): 0 L/min  Pulse Oximetry Type: Intermittent  Oximetry Probe Site Changed: No  Pulse Ox Probe Location: Right  hand            Physical Exam        Physical Exam  Vitals signs and nursing note reviewed.   General:  Patient laying supine in the bed in no acute distress  Head: Normocephalic and atraumatic.   HEENT:  Mucous membranes are moist.   Cardiovascular:  Normal rate and regular rhythm.  No Edema  Pulmonary:  Pulmonary effort is normal.  Normal breath sounds. No wheezing, rhonchi or rales.   Abdominal: Soft nontender nondistended, normal bowel sounds, no guarding no rebound tenderness  Skin: Warm and dry  Neurological: Awake alert, speech is normal        ED Course     Labs Reviewed   COMP METABOLIC PANEL (14) - Abnormal; Notable for the following components:       Result Value    Glucose 143 (*)     All other components within normal limits   CBC WITH DIFFERENTIAL WITH PLATELET - Abnormal; Notable for the following components:    WBC 11.6 (*)     Monocyte Absolute 1.12 (*)     All other components within normal limits   HEMOGLOBIN A1C - Abnormal; Notable for the following components:    HgbA1C 6.3 (*)     Estimated Average Glucose 134 (*)     All other components within normal limits   BASIC METABOLIC PANEL (8) - Abnormal; Notable for the following components:    Glucose 108 (*)     All other components within normal limits   POCT GLUCOSE - Abnormal; Notable for the following components:    POC Glucose 115 (*)     All other components within normal limits   POCT GLUCOSE - Abnormal; Notable for the following components:    POC Glucose 113 (*)     All other components within normal limits   POCT GLUCOSE - Abnormal; Notable for the following components:    POC Glucose 115 (*)     All other components within normal limits   POCT GLUCOSE - Abnormal; Notable for the following components:    POC Glucose 144 (*)     All other components within normal limits   TROPONIN I HIGH SENSITIVITY - Normal   D-DIMER - Normal   TROPONIN I HIGH SENSITIVITY - Normal   TROPONIN I HIGH SENSITIVITY - Normal   TROPONIN I HIGH SENSITIVITY -  Normal   MAGNESIUM - Normal   POCT GLUCOSE - Normal     EKG    Rate, intervals and axes as noted on EKG Report.  Rate: 84  Rhythm: Paced rhythm  Reading: No acute changes present 6012              XR CHEST AP PORTABLE  (CPT=71045)    Result Date: 9/10/2024  PROCEDURE:  XR CHEST AP PORTABLE  (CPT=71045)  TECHNIQUE:  AP chest radiograph was obtained.  COMPARISON:  [facility], XR, XR CHEST AP PORTABLE  (CPT=71045), 11/03/2023, 1:46 PM.  [facility], XR, XR CHEST PA + LAT CHEST (CPT=71046), 8/07/2023, 6:40 AM.  INDICATIONS:  chest pain since  1430  PATIENT STATED HISTORY: (As transcribed by Technologist)  Mid chest pain since 14:30 today. Pacemaker surgery 7yrs ago.    FINDINGS:  Stable mild elevation of the left hemidiaphragm.  Left chest pacemaker remains in place.  No focal consolidation.  No pleural effusion.  No pneumothorax.  No pulmonary edema.  The cardiomediastinal silhouette is within normal limits.  No acute  osseous findings.            CONCLUSION:  No acute cardiopulmonary findings.   LOCATION:  [facility]      Dictated by (CST): Maximino Trevizo MD on 9/10/2024 at 5:59 PM     Finalized by (CST): Maximino Trevizo MD on 9/10/2024 at 5:59 PM               MDM      This is a 74-year-old gentleman here for evaluation of 2 separate episodes of chest discomfort this afternoon also reports generally not feeling well for the past 2 days.  Differential includes ACS pulmonary embolism atypical chest discomfort musculoskeletal chest discomfort pneumonia pneumothorax.  No chest discomfort at present EKG shows no acute ischemic changes does demonstrate paced rhythm, troponin is negative.  Chest x-ray negative.  While here on the monitor patient did have episodes where his heart rate increases to 120 while at rest.  Do not have the ability to interrogate the pacer here with [place] emergency department.  Given intermittent chest discomfort patient's age and cardiac risk factors, or cardiac dysrhythmia, ACS will admit to  observation further monitoring and evaluation.  [facility]  [facility]  Admission disposition: 9/10/2024  7:30 PM                                        Medical Decision Making      Disposition and Plan     Clinical Impression:  1. Chest pain, atypical         Disposition:  Admit  9/10/2024  7:30 pm    Follow-up:  Staci Miguel MD  [address] [address] [phone]    Schedule an appointment as soon as possible for a visit in 1 week(s)      Flakita Rosales MD  [address] [place] [id number]  [phone]    Follow up in 2 week(s)  Our office will call to schedule appointment          Medications Prescribed:  Current Discharge Medication List        START taking these medications    Details   famotidine 20 MG Oral Tab Take 1 tablet (20 mg total) by mouth 2 (two) times daily as needed for Heartburn.  Qty: 30 tablet, Refills: 0                               Hospital Problems       Present on Admission  Date Reviewed: 8/15/2024            ICD-10-CM Noted POA    * (Principal) Chest pain, atypical R07.89 9/10/2024 Unknown

## 2024-09-12 NOTE — IMAGING NOTE
Floor RN called and instructed on gated study scheduled on this patient.  Okay eat a light breakfast/lunch, hydrate, hold caffeine/decaff/chocolate x 12 hours prior, hold long acting nitrates, take all meds especially beta blockers prescribed.  RN encouraged to call with further questions.  Advised need to order stat BMP for GFR within 24 hours, also needs 20g or higher IV in AC or higher.  RN understands.  Will follow. Plan for beta blocker admin (if needed) at 1100 and 1200 prior to exam scheduled for 1300

## 2024-09-12 NOTE — PROGRESS NOTES
Progress Note  Nasir Beauchamp Patient Status:  Observation    4/15/1950 MRN [id number]   Location [facility] 8NE-A Attending Volodymyr Bragg MD   Hosp Day # 0 PCP Staci Miguel MD     Subjective:  He just finished ambulating halls and denies chest pain today. No shortness of breath at rest or dyspnea on exertion. Discussed CCTA today.     Objective:  /63 (BP Location: Left arm)   Pulse 61   Temp 97.7 °F (36.5 °C) (Oral)   Resp 14   Ht 5' 9\" (1.753 m)   Wt 218 lb 0.6 oz (98.9 kg)   SpO2 100%   BMI 32.20 kg/m²     Intake/Output:    Intake/Output Summary (Last 24 hours) at 2024 1112  Last data filed at 2024 0848  Gross per 24 hour   Intake 930 ml   Output --   Net 930 ml       Last 3 Weights   09/10/24 2106 218 lb 0.6 oz (98.9 kg)   09/10/24 1641 215 lb (97.5 kg)   24 0839 222 lb (100.7 kg)   24 0801 222 lb 6.4 oz (100.9 kg)       Labs:  Recent Labs   Lab 09/10/24  1652 24  0544 24  0846   * 108* 178*   BUN 18 14 18   CREATSERUM 1.23 1.19 1.21   EGFRCR 61 63 62   CA 8.8 9.0 9.5    138 137   K 3.9 4.2 4.3    106 102   CO2 25.0 26.0 28.0     Recent Labs   Lab 09/10/24  1652   RBC 4.88   HGB 13.1   HCT 39.7   MCV 81.4   MCH 26.8   MCHC 33.0   RDW 16.8   NEPRELIM 7.64   WBC 11.6*   .0         Recent Labs   Lab 09/10/24  1827 09/10/24  2117 24  0544   TROPHS 9 5 5       Diagnostics:   Telemetry: AV paced    TTE 24  1. Procedure narrative: Transthoracic echocardiography for ventricular      function evaluation. The study was technically limited due to body      habitus. Intravenous contrast (Definity) was administered to evaluate      left ventricular function and opacify the LV.   2. Left ventricle: The cavity size was normal. Wall thickness was normal.      Systolic function was normal. The estimated ejection fraction was 55-60%.      Although no diagnostic regional wall motion abnormality was identified,      this possibility  cannot be completely excluded on the basis of this      study. Left ventricular diastolic function parameters were normal.   3. Right ventricle: Pacer wire noted in the right ventricle.   4. Right atrium: Pacer wire noted in right atrium.   Impressions:  This study is compared with previous dated 01/19/2018: No   significant change since prior study.     SyncSumiscMyColorScreenview 9/12/24  PERFUSION IMAGING:  There is a large fixed defect involving the entire inferior wall on both rest and stress imaging consistent with an old infarct. There is no reversible change to suggest ischemia.      GATED SPECT ANALYSIS:  Left ventricular ejection fraction is normal at 63%. There is mild hypokinesis of the inferior wall.      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   No evidence of acute ischemia.   Indeterminate EKG response to regadenoson due to ventricular paced rhythm.    Review of Systems   Cardiovascular:  Negative for chest pain and dyspnea on exertion.   Respiratory:  Negative for shortness of breath.        Physical Exam:  General: Alert and oriented in no apparent distress.  HEENT: Pupils equal. Mucous membranes moist.   Neck: No JVD  Cardiac:  Normal S1 S2, Regular. No murmur  Lungs: Clear without wheezes or crackles    Abdomen: Soft, non-tender, ND  Extremities: Without clubbing, cyanosis or edema.    Neurologic: No focal deficits. Normal affect.  Skin: Warm and dry,    Medications:   brimonidine  1 drop Right Eye 2 times per day    latanoprost  1 drop Both Eyes Nightly    rosuvastatin  10 mg Oral Nightly    insulin aspart  1-10 Units Subcutaneous TID AC and HS    heparin  5,000 Units Subcutaneous 2 times per day         Assessment:    Chest pain  No current chest pain  HsTrop negative. EKG paced. CXR without acute findings  Echocardiogram reassuring with preserved LVEF, no resting WMA  Lexiscan myoview 9/12 with reported large fixed inferior defect. Reviewed with Dr Stanton and may be gastric/bowel attenuation  artifact.   Plan is for CCTA today  History of syncope s/p [employer] PPM  DM2  Hyperlipidemia - crestor    Plan:    CCTA this afternoon. If reassuring findings, possible discharge this afternoon      Plan of care discussed with patient, RN.    KATHARINE Fuentes  9/12/2024  11:12 AM    Patient seen and examined independently.  Note reviewed and labs reviewed. Agree with above assessment and plan. 74 year old male who presented with chest pain. Abnormal Nuclear MPI yesterday. CCTA today for more definitive evaluation. Further recs to follow.        Bakari Stanton DO  Cardiologist  [facility]  9/12/2024 1:00 PM      Note to the patient: The 21st Century Cures Act makes medical notes like these available to patients in the interest of transparency. However, be advised that this is a medical document. It is intended as peer to peer communication. It is written in medical language and may contain abbreviations or verbiage that are unfamiliar. It may appear blunt or direct. Medical documents are intended to carry relevant information, facts as evident, and clinical opinion of the practitioner.     Disclaimer: Components of this note were documented using voice recognition system and are subject to errors not corrected at proofreading. Contact the author of this note for any clarifications.

## 2024-09-13 ENCOUNTER — PATIENT OUTREACH (OUTPATIENT)
Dept: CASE MANAGEMENT | Age: 74
End: 2024-09-13

## 2024-09-13 NOTE — DISCHARGE SUMMARY
[facility]     Nasir Beauchamp Patient Status:  Observation    4/15/1950 MRN [id number]   Location [facility] 8NE-A Attending No att. providers found   Hosp Day # 0 PCP tSaci Miguel MD     Date of Admission:  9/10/2024  Date of Discharge:   2024    Discharge Disposition: Home or Self Care    Discharge Diagnosis:    #Chest pain  #Non obs CAD   #DM2   #HLD  #Emphysema         History of Present Illness:    Nasir Beauchamp is a 74 year old male with PMHx DM2, HLD, emphysema, obesity presents to hospital today with a chief complaint of chest pain.  Patient reports that this is not going on for the last 24 hours.  He feels intermittent palpitations.  Patient denies any fevers or chills nausea vomiting diarrhea constipation.     -S/p aspirin and cardiology consult in the emergency room    Brief Synopsis:    The patient was admitted due to chest pain.  Acute coronary syndrome was ruled out.  He had CCTA which did show nonobstructive coronary disease and his abnormal stress test.  He was started on aspirin.  He will continue statin.  Etiology of his chest pain is felt to be likely gastritis.  He was started on famotidine as needed.  He was stable for discharge home with plans to follow with his primary care doctor.    All diagnosis' and recommendations discussed with patient and/or family in detail.      Lace+ Score: 67  59-90 High Risk  29-58 Medium Risk  0-28   Low Risk       TCM Follow-Up Recommendation:  LACE > 58: High Risk of readmission after discharge from the hospital.    Consultants:  cards    Discharge Medication List:     Discharge Medications        START taking these medications        Instructions Prescription details   aspirin 81 MG Tbec      Take 1 tablet (81 mg total) by mouth daily.   Quantity: 90 tablet  Refills: 3     famotidine 20 MG Tabs  Commonly known as: Pepcid      Take 1 tablet (20 mg total) by mouth 2 (two) times daily as needed for Heartburn.   Quantity: 30  tablet  Refills: 0            CONTINUE taking these medications        Instructions Prescription details   brimonidine 0.2 % Soln  Commonly known as: Alphagan      Place 1 drop into the right eye Q12H.   Refills: 0     Glucosamine Chondroitin Adv Tabs      Take 1 tablet by mouth daily.   Refills: 0     ipratropium 0.06 % Soln  Commonly known as: Atrovent      1 spray by Nasal route 2 (two) times daily.   Quantity: 1 each  Refills: 5     latanoprost 0.005 % Soln  Commonly known as: Xalatan      Place 1 drop into both eyes nightly.   Refills: 0     metFORMIN 500 MG Tabs  Commonly known as: Glucophage  Notes to patient: This medication should not be taken within 48 hours of receiving IV contrast dye.       TAKE 1 TABLET(500 MG) BY MOUTH DAILY WITH BREAKFAST   Quantity: 90 tablet  Refills: 0     PreserVision AREDS 2 Caps      take two tablets by mouth daily   Refills: 0     rosuvastatin 10 MG Tabs  Commonly known as: Crestor      TAKE 1 TABLET(10 MG) BY MOUTH EVERY NIGHT   Quantity: 90 tablet  Refills: 1     Trelegy Ellipta 100-62.5-25 MCG/ACT Aepb  Generic drug: fluticasone-umeclidin-vilant      Inhale 1 puff into the lungs daily.   Quantity: 3 each  Refills: 0            STOP taking these medications      Microlet Lancets Misc                  Where to Get Your Medications        [id number] - [place], [place] - [address] AT [facility] [address], [phone], [phone] [address], [place] [address]      Phone: [phone]   aspirin 81 MG Tbec  famotidine 20 MG Tabs         [place]    Follow-up appointment:   Staci Miguel MD  [address], [facility] [address]  [facility] [address] [phone]    Schedule an appointment as soon as possible for a visit in 1 week(s)      Flakita Rosales MD  [facility] [address] [address] [phone]    Follow up in 2 week(s)  Our office will call to schedule  appointment      Vital signs:  Temp:  [97.8 °F (36.6 °C)-98.3 °F (36.8 °C)] 98.3 °F (36.8 °C)  Pulse:  [61-65] 64  Resp:  [17-18] 18  BP: ()/(55-74) 111/56  SpO2:  [98 %] 98 %    Physical Exam:    General: No acute distress   Lungs: clear to auscultation  Cardiovascular: S1, S2  Abdomen: Soft      -----------------------------------------------------------------------------------------------  PATIENT DISCHARGE INSTRUCTIONS: See electronic chart    Volodymyr Bragg MD    Total minutes spent on discharge plannin      The  Century Cures Act makes medical notes like these available to patients in the interest of transparency. Please be advised this is a medical document. Medical documents are intended to carry relevant information, facts as evident, and the clinical opinion of the practitioner. The medical note is intended as peer to peer communication and may appear blunt or direct. It is written in medical language and may contain abbreviations or verbiage that are unfamiliar.

## 2024-09-16 ENCOUNTER — PATIENT MESSAGE (OUTPATIENT)
Dept: INTERNAL MEDICINE CLINIC | Facility: CLINIC | Age: 74
End: 2024-09-16

## 2024-09-17 NOTE — TELEPHONE ENCOUNTER
Future Appointments   Date Time Provider Department Center   9/20/2024 10:45 AM Staci Miguel MD [facility] [place]   10/11/2024  8:20 AM Chase Stokes MD  [facility] [place]   12/13/2024  8:00 AM Staci Miguel MD [facility] [place]

## 2024-09-20 ENCOUNTER — OFFICE VISIT (OUTPATIENT)
Dept: INTERNAL MEDICINE CLINIC | Facility: CLINIC | Age: 74
End: 2024-09-20
Payer: MEDICARE

## 2024-09-20 ENCOUNTER — PATIENT MESSAGE (OUTPATIENT)
Facility: CLINIC | Age: 74
End: 2024-09-20

## 2024-09-20 VITALS
BODY MASS INDEX: 32.35 KG/M2 | TEMPERATURE: 98 F | SYSTOLIC BLOOD PRESSURE: 122 MMHG | HEART RATE: 70 BPM | RESPIRATION RATE: 16 BRPM | WEIGHT: 218.38 LBS | OXYGEN SATURATION: 96 % | DIASTOLIC BLOOD PRESSURE: 60 MMHG | HEIGHT: 69 IN

## 2024-09-20 DIAGNOSIS — R07.89 CHEST PAIN, ATYPICAL: ICD-10-CM

## 2024-09-20 DIAGNOSIS — I25.10 ATHEROSCLEROSIS OF NATIVE CORONARY ARTERY OF NATIVE HEART WITHOUT ANGINA PECTORIS: ICD-10-CM

## 2024-09-20 DIAGNOSIS — E11.9 TYPE 2 DIABETES MELLITUS WITHOUT COMPLICATION, WITHOUT LONG-TERM CURRENT USE OF INSULIN (HCC): ICD-10-CM

## 2024-09-20 DIAGNOSIS — Z09 HOSPITAL DISCHARGE FOLLOW-UP: Primary | ICD-10-CM

## 2024-09-20 PROCEDURE — 99495 TRANSJ CARE MGMT MOD F2F 14D: CPT | Performed by: INTERNAL MEDICINE

## 2024-09-20 RX ORDER — MAGNESIUM OXIDE 400 MG/1
400 TABLET ORAL DAILY
COMMUNITY

## 2024-09-20 NOTE — TELEPHONE ENCOUNTER
Called patient in response to My chart message.  We will have Dr. Stokes review CT cardiac over read and call patient back.

## 2024-09-20 NOTE — PROGRESS NOTES
Patient went in for hospital follow-up appointment with primary care provider on 9/20/24.  Encounter closing.

## 2024-09-20 NOTE — PROGRESS NOTES
Subjective:   Nasir Beauchamp is a 74 year old male who presents for hospital follow up.   He was discharged from [facility] to Home or Self Care  Admission Date: 9/10/24   Discharge Date: 9/12/24  Hospital Discharge Diagnosis:   #Chest pain  #Non obs CAD   #DM2   #HLD  #Emphysema    Interactive contact within 2 business days post discharge first initiated on Date: 9/13/2024    During the visit, the following was completed:  Obtained and reviewed discharge summary and Hospitalist notes  Reviewed Labs (CBC, CMP)    HPI: he was admitted to the hospital with chest pain. He had several scans and etiology of the pain was found to be non-cardiac, thought to be gastritis. Started on famotidine. Symptoms have resolved.     Waking up with dry mouth in the middle of the night   New over past months    He had new medication of brimonidine eye drop for elevated eye pressure         History/Other:   Current Medications:  Medication Reconciliation:  I am aware of an inpatient discharge within the last 30 days.  The discharge medication list has been reconciled with the patient's current medication list and reviewed by me.  See medication list for additions of new medication, and changes to current doses of medications and discontinued medications.  Outpatient Medications Marked as Taking for the 9/20/24 encounter (Office Visit) with Staci Miguel MD   Medication Sig    magnesium oxide 400 MG Oral Tab Take 1 tablet (400 mg total) by mouth daily.    aspirin 81 MG Oral Tab EC Take 1 tablet (81 mg total) by mouth daily.    famotidine 20 MG Oral Tab Take 1 tablet (20 mg total) by mouth 2 (two) times daily as needed for Heartburn.    brimonidine 0.2 % Ophthalmic Solution Place 1 drop into the right eye Q12H.    METFORMIN 500 MG Oral Tab TAKE 1 TABLET(500 MG) BY MOUTH DAILY WITH BREAKFAST    rosuvastatin 10 MG Oral Tab TAKE 1 TABLET(10 MG) BY MOUTH EVERY NIGHT    ipratropium 0.06 % Nasal Solution 1 spray by Nasal route 2 (two) times  daily.    fluticasone-umeclidin-vilant (TRELEGY ELLIPTA) 100-62.5-25 MCG/ACT Inhalation Aerosol Powder, Breath Activated Inhale 1 puff into the lungs daily.    Misc Natural Products (GLUCOSAMINE CHONDROITIN ADV) Oral Tab Take 1 tablet by mouth daily.    latanoprost 0.005 % Ophthalmic Solution Place 1 drop into both eyes nightly.    Multiple Vitamins-Minerals (PRESERVISION AREDS 2) Oral Cap take two tablets by mouth daily       Review of Systems   Constitutional:  Negative for fever.   Eyes:  Negative for visual disturbance.   Respiratory:  Negative for shortness of breath.    Cardiovascular:  Negative for chest pain.   Gastrointestinal:  Negative for constipation.   Neurological: Negative.    Hematological: Negative.    Psychiatric/Behavioral: Negative.           Objective:   Physical Exam  Vitals reviewed.   Constitutional:       General: He is not in acute distress.     Appearance: He is well-developed.   HENT:      Head: Normocephalic and atraumatic.   Eyes:      Conjunctiva/sclera: Conjunctivae normal.   Cardiovascular:      Rate and Rhythm: Normal rate and regular rhythm.      Heart sounds: Normal heart sounds.   Pulmonary:      Effort: Pulmonary effort is normal.      Breath sounds: Normal breath sounds.   Musculoskeletal:      Cervical back: Neck supple.   Skin:     General: Skin is warm and dry.   Neurological:      General: No focal deficit present.      Mental Status: He is alert.   Psychiatric:         Mood and Affect: Mood normal.     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   No evidence of acute ischemia.   Indeterminate EKG response to regadenoson due to ventricular paced rhythm.      /60   Pulse 70   Temp 97.6 °F (36.4 °C) (Temporal)   Resp 16   Ht 5' 9\" (1.753 m)   Wt 218 lb 6.4 oz (99.1 kg)   SpO2 96%   BMI 32.25 kg/m²  Estimated body mass index is 32.25 kg/m² as calculated from the following:    Height as of this encounter: 5' 9\" (1.753  m).    Weight as of this encounter: 218 lb 6.4 oz (99.1 kg).    Assessment & Plan:   1. Hospital discharge follow-up (Primary)  2. Chest pain, atypical -resolved. Normal stress test and troponin in hospital. Non-cardiac etiology.   3. Atherosclerosis of native coronary artery of native heart without angina pectoris - cont rosuvastatin.   4. Type 2 diabetes mellitus without complication, without long-term current use of insulin (HCC) -A1c in hospital at 6.3; well controlled. Cont metformin         No follow-ups on file.

## 2024-09-20 NOTE — TELEPHONE ENCOUNTER
From: Nasir Beauchamp  To: Dankbony Rodriguezler  Sent: 9/20/2024 3:43 PM CDT  Subject: CT Lung Scan    In connection with my appointment scheduled for October 11th, your office has requested a CT Lung Scan be performed. I was recently an [facility] patient for some cardiac concerns and during that stay (Sep 10 to 12) I had several imaging procedures of my chest including a CT Cardiac Over Read, a CTA [place] Coronary Arteries and a Card Nuc Stress Test.    I'm not sure what, if any, of these tests may satisfy your requirements or if I still need to schedule the CT Lung Scan. Please advise.

## 2024-09-23 NOTE — TELEPHONE ENCOUNTER
Post consulting with Dr. Stokes he will still need to have the CT chest.  Cardiac CTA does not cover enough of the lungs.  Patient informed and will go for CT of the chest.

## 2024-10-04 ENCOUNTER — HOSPITAL ENCOUNTER (OUTPATIENT)
Dept: CT IMAGING | Age: 74
Discharge: HOME | End: 2024-10-04
Attending: NURSE PRACTITIONER
Payer: MEDICARE

## 2024-10-04 DIAGNOSIS — F17.201 TOBACCO DEPENDENCE IN REMISSION: ICD-10-CM

## 2024-10-04 PROCEDURE — 71271 CT THORAX LUNG CANCER SCR C-: CPT | Performed by: NURSE PRACTITIONER

## 2024-10-07 ENCOUNTER — PATIENT MESSAGE (OUTPATIENT)
Dept: INTERNAL MEDICINE CLINIC | Facility: CLINIC | Age: 74
End: 2024-10-07

## 2024-10-07 DIAGNOSIS — M12.812 ROTATOR CUFF ARTHROPATHY OF LEFT SHOULDER: Primary | ICD-10-CM

## 2024-10-07 DIAGNOSIS — M75.102 ROTATOR CUFF SYNDROME OF LEFT SHOULDER: ICD-10-CM

## 2024-10-08 NOTE — TELEPHONE ENCOUNTER
LS: Pt's L shoulder discomfort is bothering him, requesting for a referral for ortho. Pended referral, please sign if agree, TY!   Labs are excellent except your fasting sugar was a hair high. This is better than where it has been in the past. Your liver function tests are normal! Your A1c (diabetes screening test is pending).

## 2024-10-08 NOTE — TELEPHONE ENCOUNTER
From: Nasir Beauchamp  To: Staci Miguel  Sent: 10/7/2024 3:24 PM CDT  Subject: SHOULDER DISCOMFORT    For the past several weeks I have been experiencing pain in my left shoulder. I have self medicated with Extra StrengthTylenol, Advil Targeted Relief Cream, I've tried icing it and heat pads all with very limited success. Most nights I will wake at 1:30 - 2:00 AM and go in our guest bedroom so as not to disturb my wife. At times I will be able to get maybe an hour or so of sleep before the discomfort gets me up for the day. I think I am at the point where I need to consult with and orthopedic doctor. Have you any recommendations?

## 2024-10-10 ENCOUNTER — TELEPHONE (OUTPATIENT)
Dept: ORTHOPEDICS CLINIC | Facility: CLINIC | Age: 74
End: 2024-10-10

## 2024-10-10 DIAGNOSIS — M25.512 LEFT SHOULDER PAIN, UNSPECIFIED CHRONICITY: Primary | ICD-10-CM

## 2024-10-10 NOTE — TELEPHONE ENCOUNTER
Patient is seeing Dr. Benton for left shoulder discomfort. Please advise if imaging is needed.  Future Appointments   Date Time Provider Department Center   10/11/2024  8:20 AM Chase Stokes MD [id number] [place]   10/25/2024  7:20 AM Julio Benton MD [id number]   12/11/2024  8:00 AM Staci Miguel MD [facility] [place]   12/13/2024  7:30 AM MICHEAL BARROSO [id number] None

## 2024-10-11 ENCOUNTER — OFFICE VISIT (OUTPATIENT)
Age: 74
End: 2024-10-11
Payer: MEDICARE

## 2024-10-11 VITALS
WEIGHT: 215 LBS | RESPIRATION RATE: 16 BRPM | HEART RATE: 72 BPM | SYSTOLIC BLOOD PRESSURE: 110 MMHG | OXYGEN SATURATION: 97 % | BODY MASS INDEX: 32.58 KG/M2 | DIASTOLIC BLOOD PRESSURE: 52 MMHG | HEIGHT: 68 IN

## 2024-10-11 DIAGNOSIS — J32.9 CHRONIC RHINOSINUSITIS: ICD-10-CM

## 2024-10-11 DIAGNOSIS — R05.3 CHRONIC COUGH: ICD-10-CM

## 2024-10-11 DIAGNOSIS — J41.0 SIMPLE CHRONIC BRONCHITIS (HCC): Primary | ICD-10-CM

## 2024-10-11 DIAGNOSIS — Z87.891 FORMER CIGARETTE SMOKER: ICD-10-CM

## 2024-10-11 PROCEDURE — 99214 OFFICE O/P EST MOD 30 MIN: CPT | Performed by: INTERNAL MEDICINE

## 2024-10-11 NOTE — PATIENT INSTRUCTIONS
You can finish off the trelegy and trial off the inhaler and see how you feel. If your symptoms return (cough, wheeze, shortness of breath), you could either keep using the trelegy every other day or change to anoro inhaler once a day  Obtain a CT chest scan on/after 10/4/2025. Call central scheduling at [phone] to schedule the CT scan   if you get ill (sinus infection, cold, respiratory illness or other illness) you should postpone the scan for at least 4-6 weeks after you have recovered. Call with questions/concerns

## 2024-10-11 NOTE — PROGRESS NOTES
Health system General Pulmonary Progress Note    History of Present Illness:  Nasir Beauchamp is a 74 year old male former smoker (quit 2017, 50 pack years) with significant PMH of DM, HTN, s/p PPM who presents today for follow up. Since last visit he has been well. He started using trelegy every other day to avoid thrush issues and has felt no cough or dyspnea.     December 2023 previously RAMESH Haywood  Reports having increase SOB and wheezing leading him to use Trelegy again. Felt a little better however unable to continue to use as inhaler open for > 6 weeks. No f/c/ns. Increased clearing his throat and phlegm. Increased wheezing last night; using albuterol with relief.     Previously 10/2023  a 73 year old male, former smoker,  with significant PMH of bronchitis and sinusitis who presents today to review LDCT scan. Feels well. Thrush and hoarseness completely resolved, denies SOB off of inhalers.      Previously 9/27/2023  a 73 year old male who presents for follow - up. States the hoarseness has resolved. States no new issues. Denies cough or SOB.      Previously 9/13/23  a 73 year old male who presents for c/o hoarseness that he has noticed over the past month. On trelegy since May feels like he needs to clear his throat often and the hoarseness. Breathing is significantly better. Rinsing his mouth after each inhaler use. No f/c/ns. No s/sx of GERD. No rhinorhea or PND.     Previous 6/2023  a 73 year old male who presents for follow - up. States the breathing is much better on the Trelegy. Reports sleeping through the night, no cough, SOB. Using nasal wash, flonase, Trelegy; has not needed albuterol inhlaer. Feels back to normal.     Previously Dr Stokes 5/23/23  a 73 year old male former smoker (quit 2017, 50 pack years) with significant PMH of DM, HTN, s/p PPM  who presents today for evaluation of dyspnea. He reports having been well until around March 2023 after awakening with dyspnea, cough and wheeze. He was  seen/managed in ER treated for bronchospasm, possible pneumonia and given prednisone, albuterol and zpak.  He did have some improvement, however his symptoms worsened and he was evaluated again in ER in April x 2 and again in early may 2023.  He now presents today for further evaluation. He admits the albuterol helps but only lasts about 2 hours, then needs to take it again. He has had ongoing wheezing for the past few months. He has had ongoing sinus congestion/drainage leading to frequent cough and throat clearing with clear phlegm. No blood. No fevers. No chest pain/pressure.   Has had bronchitis episodes in the past several years, but never diagnosed with asthma or COPD.   Has noted sneezing when in his office at home, not aware of any new furniture, home decor, no renovations or changes.      Retired, worked as , not aware of any significant exposures.      Past Medical History:   Past Medical History:    Abdominal hernia    ALCOHOL USE    1-2 times/wk    Arthritis    Back pain    Bronchitis    Chronic left-sided low back pain    Diabetes (HCC)    diet controlled - dx 2/2020    Easy bruising    Elevated lipase    Flatulence/gas pain/belching    Gout    Hernia, abdominal    1973 hernia repair. 2001 double hernia repair    Left knee pain    Obesity    Pacemaker    Pneumonia due to organism    Pulmonary emphysema (HCC)    Wears glasses        Past Surgical History:   Past Surgical History:   Procedure Laterality Date    Cardiac pacemaker placement  2018    Cataract      Colonoscopy  2014    Colonoscopy N/A 10/08/2021    Procedure: COLONOSCOPY with cold snare polypectomy;  Surgeon: Stewart Tolentino MD;  Location:  ENDOSCOPY    Dental surgery procedure  2009    dental implants    Hernia surgery Right 1973    Hernia surgery Bilateral 2001    Lasik Bilateral 2003    Other surgical history      Dental Implants 6 years ago    Pacemaker/defibrillator      2017    Repair ing hernia,5+y/o,reducibl      Repair  of nasal septum N/A     Tonsillectomy  's    Vasectomy         Family Medical History:   Family History   Problem Relation Age of Onset    Other (Other) Father         dementia    Other (Other) Mother         COPD    Asthma Mother     Pulmonary Disease Mother         Social History:   Social History     Socioeconomic History    Marital status:      Spouse name: Not on file    Number of children: Not on file    Years of education: Not on file    Highest education level: Not on file   Occupational History    Not on file   Tobacco Use    Smoking status: Former     Current packs/day: 0.00     Average packs/day: 1 pack/day for 52.8 years (52.8 ttl pk-yrs)     Types: Cigarettes     Start date: 1965     Quit date: 10/30/2017     Years since quittin.9     Passive exposure: Past (pt's parents both smoked in the home)    Smokeless tobacco: Never   Vaping Use    Vaping status: Never Used   Substance and Sexual Activity    Alcohol use: Yes     Alcohol/week: 1.0 standard drink of alcohol     Types: 1 Standard drinks or equivalent per week     Comment: 1 drink weekly    Drug use: Never    Sexual activity: Not on file   Other Topics Concern    Caffeine Concern No    Exercise No    Seat Belt No    Special Diet No    Stress Concern No    Weight Concern No   Social History Narrative    Not on file     Social Drivers of Health     Financial Resource Strain: Not on file   Food Insecurity: Unknown (9/10/2024)    Food Insecurity     Food Insecurity: Patient declined   Transportation Needs: No Transportation Needs (9/10/2024)    Transportation Needs     Lack of Transportation: No     Car Seat: Not on file   Physical Activity: Not on file   Stress: Not on file   Social Connections: Not on file   Housing Stability: Low Risk  (9/10/2024)    Housing Stability     Housing Instability: No     Housing Instability Emergency: Not on file     Crib or Bassinette: Not on file        Medications:   Current Outpatient  Medications   Medication Sig Dispense Refill    magnesium oxide 400 MG Oral Tab Take 1 tablet (400 mg total) by mouth daily.      PEG 3350-KCl-Na Bicarb-NaCl 420 g Oral Recon Soln Take as directed by physician 4000 mL 0    aspirin 81 MG Oral Tab EC Take 1 tablet (81 mg total) by mouth daily. 90 tablet 3    brimonidine 0.2 % Ophthalmic Solution Place 1 drop into the right eye Q12H.      METFORMIN 500 MG Oral Tab TAKE 1 TABLET(500 MG) BY MOUTH DAILY WITH BREAKFAST 90 tablet 0    rosuvastatin 10 MG Oral Tab TAKE 1 TABLET(10 MG) BY MOUTH EVERY NIGHT 90 tablet 1    fluticasone-umeclidin-vilant (TRELEGY ELLIPTA) 100-62.5-25 MCG/ACT Inhalation Aerosol Powder, Breath Activated Inhale 1 puff into the lungs daily. 3 each 0    Misc Natural Products (GLUCOSAMINE CHONDROITIN ADV) Oral Tab Take 1 tablet by mouth daily.      latanoprost 0.005 % Ophthalmic Solution Place 1 drop into both eyes nightly.      Multiple Vitamins-Minerals (PRESERVISION AREDS 2) Oral Cap take two tablets by mouth daily      famotidine 20 MG Oral Tab Take 1 tablet (20 mg total) by mouth 2 (two) times daily as needed for Heartburn. (Patient not taking: Reported on 10/11/2024) 30 tablet 0    ipratropium 0.06 % Nasal Solution 1 spray by Nasal route 2 (two) times daily. 1 each 5       Review of Systems: Review of Systems   Constitutional: Negative.    HENT: Negative.     Respiratory: Negative.     Cardiovascular: Negative.    Musculoskeletal:  Positive for arthralgias and back pain.   All other systems reviewed and are negative.       Physical Exam:  /52 (BP Location: Right arm, Patient Position: Sitting, Cuff Size: adult)   Pulse 72   Resp 16   Ht 5' 8\" (1.727 m)   Wt 215 lb (97.5 kg)   SpO2 97%   BMI 32.69 kg/m²      Constitutional: alert, cooperative. No acute distress.  HEENT: Head NC/AT.    Cardio: Regular rate and rhythm. Normal S1 and S2. No murmurs.   Respiratory: Thorax symmetrical with no labored breathing. Clear to ausculation  bilaterally with symmetrical breath sounds. No wheezing, rhonchi, rales, or crackles.   GI: NABS. Abd soft, non-tender.  Extremities: No clubbing or cyanosis. No BLE edema.    Neurologic: A&Ox3. No gross motor deficits.  Skin: Warm, dry  Psych: Calm, cooperative. Pleasant affect.    Results:  Personally reviewed    WBC: 11.6, done on 9/10/2024.  HGB: 13.1, done on 9/10/2024.  PLT: 206, done on 9/10/2024.     Glucose: 178, done on 9/12/2024.  Cr: 1.21, done on 9/12/2024.  Last eGFR was 62 on 9/12/2024.  CA: 9.5, done on 9/12/2024.  Na: 137, done on 9/12/2024.  K: 4.3, done on 9/12/2024.  Cl: 102, done on 9/12/2024.  CO2: 28, done on 9/12/2024.  Last ALB was 3.6% done on 9/10/2024.     CT LUNG LD SCREENING(CPT=71271)    Result Date: 10/4/2024  CONCLUSION:  1. Lung-RADS Category  2:  Stable pulmonary nodules as described above..  2. Lung-RADS Category S:    Negative.  3. Emphysematous changes within the lungs. 4. Hepatic steatosis  RECOMMENDATIONS:  LUNG-RADS 2: Continued routine annual LDCT lung screening.  Suggest next exam on or around 10/4/2025.    CT Lung Screening Reporting and Data System (Lung-RADS 1.1)    Lung Cancer Specific Category   ACR -Guidelines Based Follow-up   Category    Assessment                  Recommendation   0  Incomplete  Further imaging recommended    1  Negative  Routine annual LDCT screen (age <80)   2  Benign appearance or behavior  Routine annual LDCT screen (age <80)   3  Probably benign  Interval short-term diagnostic LDCT (6 months)   4a  Suspicious  Short-term follow-up LDCT or PET/CT (3 months)   4b  Suspicious  Multidisciplinary Conference Review   4x  Suspicious  Category 3 or 4 nodules with other suspicious features   S  Other potentially significant findings  Follow-up based on abnormality  LungRAD scores of 3, 4A, and 4B will be reviewed in the Multidisciplinary [facility]      LOCATION:  [facility]    Dictated by (CST): Jeremy Rivers MD on 10/04/2024 at 9:05 AM      Finalized by (CST): Jeremy Rivers MD on 10/04/2024 at 9:12 AM       CT CARDIAC OVER READ    Result Date: 9/12/2024  CONCLUSION:  No significant incidental findings.    LOCATION:  [facility]    Dictated by (CST): Alexander Chi MD on 9/12/2024 at 3:11 PM     Finalized by (CST): Alexander Chi MD on 9/12/2024 at 3:13 PM       XR CHEST AP PORTABLE  (CPT=71045)    Result Date: 9/10/2024  CONCLUSION:  No acute cardiopulmonary findings.   LOCATION:  [facility]      Dictated by (CST): Maximino Trevizo MD on 9/10/2024 at 5:59 PM     Finalized by (CST): Maximino Trevizo MD on 9/10/2024 at 5:59 PM         Assessment/Plan:  #1. COPD/ chronic bronchitis  Suspect may have allergy component, postnasal gtt/sinusitis, also with underlying COPD  5/2023 PFTs with no obstruction but +air trapping and mildly reduced DLCO 64%  Previously improved on trelegy 200 but had thrush so decreased to 100 dosing last year.  Self started alternating days and reports feeling well  Given his improvement/stability, we reviewed inhaler options either to keep with trelegy 100 every other day vs change to anoro daily - he wishes to keep trelegy 100 every other day for now but may trial off the inhaler altogether once he finishes his supply    #2. Chronic rhinosinusitis  Seen/following with ENT, advised nasal rinses and nasal steroids     #3. Tobacco abuse  Former smoker, 50 pack years, quit 2017  10/2024 LDCT stable with no change in nodules, lung rads 2  Continue annual screening/scans  Next due 10/2025  Lung Cancer Counseling and Shared Decision Making Session in an Asymptomatic Smoker/Former Smoker   Nasir Beauchamp is a 74 year old male without current symptoms of lung cancer.  History   Smoking Status    Former    Types: Cigarettes   Smokeless Tobacco    Never        He received information on the importance of adherence to annual lung cancer Low Dose CT (LDCT) screening, the impact of his comorbidities and his ability or willingness to undergo diagnosis and  treatment. he is in agreement and an order will be placed for CT LUNG LD SCREENING(CPT=71271).    We counseled the importance of maintaining cigarette smoking abstinence if he is a former smoker and the importance of smoking cessation if he is a current smoker and we discussed and furnished information about tobacco cessation interventions.    #4. Thrush  Previously developed with trelegy 200  Now resolved with change in inhaler dose and regimen    Chase Stokes MD  10/11/2024

## 2024-10-16 ENCOUNTER — TELEPHONE (OUTPATIENT)
Dept: ORTHOPEDICS CLINIC | Facility: CLINIC | Age: 74
End: 2024-10-16

## 2024-10-16 DIAGNOSIS — M25.552 LEFT HIP PAIN: Primary | ICD-10-CM

## 2024-10-16 NOTE — TELEPHONE ENCOUNTER
Patient scheduled with Dr Hines for Acute left hip pain   Please advise if imaging is needed  Future Appointments   Date Time Provider Department Center   10/25/2024  7:00 AM WDR XR RM1 [facility] XRAY [facility]   10/25/2024  7:20 AM Julio Benton MD [facility] [id number]   11/11/2024 10:40 AM Fili Hines MD [facility]OP[facility] [address]   12/11/2024  8:00 AM Staci Miguel MD [facility] [place]   12/13/2024  7:30 AM MICHEAL BARROSO [id number] None   10/14/2025  8:20 AM Chase Stokes MD [facility] [id number] [facility] [place]

## 2024-10-18 ENCOUNTER — PATIENT MESSAGE (OUTPATIENT)
Dept: ORTHOPEDICS CLINIC | Facility: CLINIC | Age: 74
End: 2024-10-18

## 2024-10-21 ENCOUNTER — HOSPITAL ENCOUNTER (OUTPATIENT)
Dept: GENERAL RADIOLOGY | Age: 74
Discharge: HOME | End: 2024-10-21
Attending: ORTHOPAEDIC SURGERY
Payer: MEDICARE

## 2024-10-21 DIAGNOSIS — M25.552 LEFT HIP PAIN: ICD-10-CM

## 2024-10-21 PROCEDURE — 73502 X-RAY EXAM HIP UNI 2-3 VIEWS: CPT | Performed by: ORTHOPAEDIC SURGERY

## 2024-10-25 ENCOUNTER — OFFICE VISIT (OUTPATIENT)
Dept: ORTHOPEDICS CLINIC | Facility: CLINIC | Age: 74
End: 2024-10-25
Payer: MEDICARE

## 2024-10-25 ENCOUNTER — HOSPITAL ENCOUNTER (OUTPATIENT)
Dept: GENERAL RADIOLOGY | Age: 74
Discharge: HOME | End: 2024-10-25
Attending: ORTHOPAEDIC SURGERY
Payer: MEDICARE

## 2024-10-25 DIAGNOSIS — M75.02 ADHESIVE CAPSULITIS OF LEFT SHOULDER: ICD-10-CM

## 2024-10-25 DIAGNOSIS — M25.512 LEFT SHOULDER PAIN, UNSPECIFIED CHRONICITY: ICD-10-CM

## 2024-10-25 DIAGNOSIS — M19.019 GLENOHUMERAL ARTHRITIS: Primary | ICD-10-CM

## 2024-10-25 PROCEDURE — 99203 OFFICE O/P NEW LOW 30 MIN: CPT | Performed by: ORTHOPAEDIC SURGERY

## 2024-10-25 PROCEDURE — 73030 X-RAY EXAM OF SHOULDER: CPT | Performed by: ORTHOPAEDIC SURGERY

## 2024-10-25 NOTE — H&P
Orthopaedic Surgery  [address] [place] [id number]  [phone]     NEW PATIENT VISIT - HISTORY AND PHYSICAL EXAMINATION     Name: Nasir Beauchamp   MRN: [id number]  Date: 10/25/2024     CC: Left shoulder pain    REFERRED BY: Staci Miguel MD    HPI:   Nasir Beauchamp is a very pleasant 74 year old left-hand dominant male who presents today for evaluation of left shoulder pain ongoing since August 2024. Denies any specific injuries. Pain level is 5/10 and described as shooting. This improves with Tylenol temporarily. Reports difficulty sleeping at night.     He enjoys golf. Lives with his spouse and is retired.     PMH:   Past Medical History:    Abdominal hernia    ALCOHOL USE    1-2 times/wk    Arthritis    Back pain    Bronchitis    Chronic left-sided low back pain    Diabetes (HCC)    diet controlled - dx 2/2020    Easy bruising    Elevated lipase    Flatulence/gas pain/belching    Gout    Hernia, abdominal    1973 hernia repair. 2001 double hernia repair    Left knee pain    Obesity    Pacemaker    Pneumonia due to organism    Pulmonary emphysema (HCC)    Wears glasses       PAST SURGICAL HX:  Past Surgical History:   Procedure Laterality Date    Cardiac pacemaker placement  2018    Cataract      Colonoscopy  2014    Colonoscopy N/A 10/08/2021    Procedure: COLONOSCOPY with cold snare polypectomy;  Surgeon: Stewart Tolentino MD;  Location:  ENDOSCOPY    Dental surgery procedure  2009    dental implants    Hernia surgery Right 1973    Hernia surgery Bilateral 2001    Lasik Bilateral 2003    Other surgical history      Dental Implants 6 years ago    Pacemaker/defibrillator      2017    Repair ing hernia,5+y/o,reducibl      Repair of nasal septum N/A 2003    Tonsillectomy  1950's    Vasectomy  1978       FAMILY HX:  Family History   Problem Relation Age of Onset    Other (Other) Father         dementia    Other (Other) Mother         COPD    Asthma Mother     Pulmonary Disease Mother         ALLERGIES:  Patient has no known allergies.    MEDICATIONS:   Current Outpatient Medications   Medication Sig Dispense Refill    magnesium oxide 400 MG Oral Tab Take 1 tablet (400 mg total) by mouth daily.      PEG 3350-KCl-Na Bicarb-NaCl 420 g Oral Recon Soln Take as directed by physician 4000 mL 0    aspirin 81 MG Oral Tab EC Take 1 tablet (81 mg total) by mouth daily. 90 tablet 3    famotidine 20 MG Oral Tab Take 1 tablet (20 mg total) by mouth 2 (two) times daily as needed for Heartburn. (Patient not taking: Reported on 10/11/2024) 30 tablet 0    brimonidine 0.2 % Ophthalmic Solution Place 1 drop into the right eye Q12H.      METFORMIN 500 MG Oral Tab TAKE 1 TABLET(500 MG) BY MOUTH DAILY WITH BREAKFAST 90 tablet 0    rosuvastatin 10 MG Oral Tab TAKE 1 TABLET(10 MG) BY MOUTH EVERY NIGHT 90 tablet 1    ipratropium 0.06 % Nasal Solution 1 spray by Nasal route 2 (two) times daily. 1 each 5    fluticasone-umeclidin-vilant (TRELEGY ELLIPTA) 100-62.5-25 MCG/ACT Inhalation Aerosol Powder, Breath Activated Inhale 1 puff into the lungs daily. 3 each 0    Misc Natural Products (GLUCOSAMINE CHONDROITIN ADV) Oral Tab Take 1 tablet by mouth daily.      latanoprost 0.005 % Ophthalmic Solution Place 1 drop into both eyes nightly.      Multiple Vitamins-Minerals (PRESERVISION AREDS 2) Oral Cap take two tablets by mouth daily         ROS: A comprehensive 14 point review of systems was performed and was negative aside from the aforementioned per history of present illness.    SOCIAL HX:  Social History     Tobacco Use    Smoking status: Former     Current packs/day: 0.00     Average packs/day: 1 pack/day for 52.8 years (52.8 ttl pk-yrs)     Types: Cigarettes     Start date: 1965     Quit date: 10/30/2017     Years since quittin.9     Passive exposure: Past (pt's parents both smoked in the home)    Smokeless tobacco: Never   Substance Use Topics    Alcohol use: Yes     Alcohol/week: 1.0 standard drink of alcohol      Types: 1 Standard drinks or equivalent per week     Comment: 1 drink weekly       PE:   There were no vitals filed for this visit.  Estimated body mass index is 32.69 kg/m² as calculated from the following:    Height as of 10/11/24: 5' 8\" (1.727 m).    Weight as of 10/11/24: 215 lb.    Physical Exam  Constitutional:       Appearance: Normal appearance.   HENT:      Head: Normocephalic and atraumatic.   Eyes:      Extraocular Movements: Extraocular movements intact.   Neck:      Musculoskeletal: Normal range of motion and neck supple.   Cardiovascular:      Pulses: Normal pulses.   Pulmonary:      Effort: Pulmonary effort is normal. No respiratory distress.   Abdominal:      General: There is no distension.   Skin:     General: Skin is warm.      Capillary Refill: Capillary refill takes less than 2 seconds.      Findings: No bruising.   Neurological:      General: No focal deficit present.      Mental Status: Alert.   Psychiatric:         Mood and Affect: Mood normal.     Examination of the left shoulder demonstrates:   Skin is intact, warm and dry.   Cervical:  Full ROM  Spurling's  Negative    Deformity:   none  Atrophy:   none    Scapular winging: Negative    Palpation:     AC Joint   Negative  Biceps Tendon  Negative  Greater Tuberosity Negative    ROM:   Forward Flexion:  120°  Abduction:   60°  External Rotation:  30°  Internal Rotation:  sacrum    Rotator Cuff Strength:   Supraspinatus:   5/5  Subscapularis:   5/5  Infraspinatus/Teres: 5/5    Provocative Tests:   Andersen:   Positive  Speed's:   Negative  [place]'s:   Negative  Lift-off:    Negative  Apprehension:  Negative  Sulcus Sign:   Negative    Neurovascular Upper Extremity (Bilateral)  Motor:    5/5 EPL, Finger Abduction, , Pinch, Deltoid  Sensation:   intact to light touch median, ulnar, radial and axillary nerve  Circulation:   Normal, 2+ radial pulse    The contralateral upper extremity is without limitation in range of motion or strength, no  positive provocative maneuvers.       Radiographic Examination/Diagnostics:    Shoulder XR personally viewed, independently interpreted and radiology report was reviewed.    Radiographs demonstrate mild evidence of osteoarthritis.   XR SHOULDER, COMPLETE (MIN 2 VIEWS), LEFT (CPT=73030)    Result Date: 10/25/2024  PROCEDURE:  XR SHOULDER, COMPLETE (MIN 2 VIEWS), LEFT (CPT=73030)  TECHNIQUE:  Multiple views were obtained.  COMPARISON:  None.  INDICATIONS:  M25.512 Left shoulder pain, unspecified chronicity  PATIENT STATED HISTORY: (As transcribed by Technologist)  Ortho consult. Patient states chronic left shoulder pain, no known injury.    FINDINGS: No fracture or dislocation. Left glenohumeral joint space is well maintained.  Minimal hypertrophic changes of the left acromioclavicular joint.  Calcification adjacent to left greater tubercle may be due to calcific tendinopathy. IMPRESSION: No fracture or dislocation.  Mild to moderate osteoarthritic changes of the left shoulder.   LOCATION:  [facility]   Dictated by (CST): Rolando Cintron MD on 10/25/2024 at 8:22 AM     Finalized by (CST): Rolando Cintron MD on 10/25/2024 at 8:23 AM       XR HIP W OR WO PELVIS 2 OR 3 VIEWS, LEFT (CPT=73502)    Result Date: 10/21/2024  CONCLUSION:  No evidence of acute displaced fracture or dislocation in the left hip.  LOCATION:  [facility]   Dictated by (CST): Wilfredo Wilcox MD on 10/21/2024 at 8:56 AM     Finalized by (CST): Wilfredo Wilcox MD on 10/21/2024 at 8:56 AM            IMPRESSION: Nasir Beauchamp is a 74 year old Left hand dominant male with left glenohumeral arthritis and adhesive capsulitis symptomatic since August 2024.     Treatment is amenable to physical therapy.     PLAN:   We had a detailed discussion outlining the etiology, anatomy, pathophysiology, and natural history of patient's findings. Imaging was reviewed in detail and correlated to a 3-dimensional model of the shoulder.     We reviewed the treatment of this  disease condition.  Fortunately, treatment is amenable to conservative treatment which we chose to optimize at today's visit.  I recommended physical therapy to aid in strengthening, range of motion, functional improvement, and return to baseline activity.       The patient had the opportunity to ask questions and all questions were answered appropriately.      FOLLOW-UP:   Return to clinic on an as needed basis.       Julio Benton MD  Knee, Shoulder, & Elbow Surgery / Sports Medicine Specialist  Orthopaedic Surgery  [address] [place] [address].[email or web address]  [email or web address]  t: [phone]  o: [phone]  f: [phone]    This note was dictated using Dragon software.  While it was briefly proofread prior to completion, some grammatical, spelling, and word choice errors due to dictation may still occur.

## 2024-11-01 ENCOUNTER — OFFICE VISIT (OUTPATIENT)
Dept: PHYSICAL THERAPY | Age: 74
End: 2024-11-01
Attending: ORTHOPAEDIC SURGERY
Payer: MEDICARE

## 2024-11-01 DIAGNOSIS — M19.019 GLENOHUMERAL ARTHRITIS: Primary | ICD-10-CM

## 2024-11-01 DIAGNOSIS — M75.02 ADHESIVE CAPSULITIS OF LEFT SHOULDER: ICD-10-CM

## 2024-11-01 PROCEDURE — 97162 PT EVAL MOD COMPLEX 30 MIN: CPT

## 2024-11-01 PROCEDURE — 97110 THERAPEUTIC EXERCISES: CPT

## 2024-11-01 NOTE — PROGRESS NOTES
SHOULDER EVALUATION:     Diagnosis:   Associated DX:  Glenohumeral arthritis (M19.019)  Adhesive capsulitis of left shoulder (M75.02)      Referring Provider: Julio Benton  Date of Evaluation:    11/1/2024    Precautions:  Pacemaker Next MD visit:   none scheduled  Date of Surgery: n/a     PATIENT SUMMARY   Nasir Beauchamp is a 74 year old male who presents to therapy today with complaints of left shoulder pain. Patient states the pain in the left shoulder started about 8 weeks ago. Does not recall a mechanism of injury but the pain started after being hospitalized for heart issues for 3 days. Started using the cane about 6 weeks ago. Does have some neck pain.  Pt describes pain level current 4/10   Pain location: L shoulder (laterally) ; Pain Description sharp   Status (Improving/ unchanging/ worsening AND constant or intermittent): intermittent  Aggravating factors: sleeping, reaching out of side  Alleviating factors: medication  Night pain: yes, wakes him up at night  Numbness and Tingling: Occasionally- has been happening before shoulder started hurting  Occupation: returned  Current functional limitations include \"anything that involves some activity\".   Nasir describes prior level of function Recent left hip symptoms have become more aggravated. No history of left shoulder pain Pt goals include to get rid of the pain.  Past medical history was reviewed with Nasir. Significant findings include  has a past medical history of Abdominal hernia (1973 and 2001), ALCOHOL USE, Arthritis, Back pain (2016), Bronchitis (03/2023), Chronic left-sided low back pain, Diabetes (HCC), Easy bruising (2019), Elevated lipase (11/25/2015), Flatulence/gas pain/belching (Randomly), Gout, Hernia, abdominal (1973), Left knee pain, Obesity, Pacemaker (02/2018), Pneumonia due to organism (Apr 2023), Pulmonary emphysema (HCC) (Mother), and Wears glasses (Reading glasses only).    ASSESSMENT  Nasir presents to physical therapy evaluation  with primary c/o left shoulder pain. The results of the objective tests and measures show decreased upper extremity range of motion, decreased upper extremity strength and posture contributing to symptoms.  Functional deficits include but are not limited to reaching. Pt and PT discussed evaluation findings, pathology, POC and HEP.  Pt voiced understanding and performs HEP correctly without reported pain. Skilled Physical Therapy is medically necessary to address the above impairments and reach functional goals.     OBJECTIVE:   Observation/Posture: forward head posture, thoracic kyphosis  Palpation: no TTP noted  Cervical Screen: R  rotation min loss, no pain  L shoulder mod loss, painful at left shoulder  Retraction mod loss, no pain  Extension min loss, no pain    AROM: (* denotes performed with pain)  Shoulder    Flexion: R 118 deg; L 130 deg  Abduction: R 110 deg; L 83 deg*  ER: R 65 deg; L 70 deg  IR (functional): R T12; L T12  Horizontal adduction R no loss, L mod loss, painful*   PROM: WNL for L shoulder IR and ER; same as active for flexion and abduction (painful)  Strength/MMT: (* denotes performed with pain)  Shoulder   Flexion: R 4/5; L 4/5  Abduction: R 4/5; L 4/5  ER: R 4+/5; L 4+/5  IR: R 4+/5; L 4+/5     Special tests:   Cervical retraction with patient OP x 10 - no change    Today’s Treatment and Response:   Pt education was provided on exam findings, treatment diagnosis, treatment plan, expectations, and prognosis. Pt was also provided recommendations for postural corrections and ergonomics.  Patient was instructed in and issued a HEP for: repeated horizontal adduction with overpressure x 10 every 2-3 hours    Charges: PT Eval Moderate Complexity, 1TE      Total Timed Treatment: 10 min     Total Treatment Time: 45 min     Based on clinical rationale and outcome measures, this evaluation involved Moderate Complexity decision making due to 1-2 personal factors/comorbidities, 3 body structures  involved/activity limitations, and evolving symptoms including changing pain levels.  PLAN OF CARE:    Goals: (to be met in 8 visits)   Pt will report improved ability to sleep without waking due to shoulder pain   Pt will improve shoulder flexion AROM to >140 degrees to be able to reach into overhead cabinets without pain or restriction   Pt will improve shoulder abduction AROM to >120 degrees to improve ability to don deodorant, don/doff shirts, and wash hair   Pt will be independent and compliant with comprehensive HEP to maintain progress achieved in PT       Frequency / Duration: Patient will be seen for 2 x/week or a total of 8 visits over a 90 day period. Treatment will include: Manual Therapy, Neuromuscular Re-education, Self-Care Home Management, Therapeutic Activities, Therapeutic Exercise, and Home Exercise Program instruction    Education or treatment limitation: None  Rehab Potential:excellent    QuickDASH Outcome Score  Score: (Patient-Rptd) 43.18 % (10/29/2024  6:26 AM)      Patient/Family/Caregiver was advised of these findings, precautions, and treatment options and has agreed to actively participate in planning and for this course of care.    Thank you for your referral. Please co-sign or sign and return this letter via fax as soon as possible to [phone]. If you have any questions, please contact me at Dept: [phone]    Sincerely,  Electronically signed by therapist: Sharee Garcia, PT, DPT, Cert.MDT  Physician's certification required: Yes  I certify the need for these services furnished under this plan of treatment and while under my care.    X___________________________________________________ Date____________________    Certification From: 11/1/2024  To:1/30/2025      Kevan Burnett)  Cardiovascular Disease  [address] [facility] [email or web address]], [address] [place] [address]  Phone: [phone]  Fax: [phone]  Follow Up Time:

## 2024-11-07 ENCOUNTER — APPOINTMENT (OUTPATIENT)
Dept: PHYSICAL THERAPY | Age: 74
End: 2024-11-07
Attending: ORTHOPAEDIC SURGERY
Payer: MEDICARE

## 2024-11-11 ENCOUNTER — OFFICE VISIT (OUTPATIENT)
Facility: CLINIC | Age: 74
End: 2024-11-11
Payer: MEDICARE

## 2024-11-11 VITALS — HEIGHT: 67 IN | BODY MASS INDEX: 34 KG/M2

## 2024-11-11 DIAGNOSIS — M16.12 PRIMARY OSTEOARTHRITIS OF LEFT HIP: Primary | ICD-10-CM

## 2024-11-11 RX ORDER — TRIAMCINOLONE ACETONIDE 40 MG/ML
40 INJECTION, SUSPENSION INTRA-ARTICULAR; INTRAMUSCULAR ONCE
Status: COMPLETED | OUTPATIENT
Start: 2024-11-11 | End: 2024-11-11

## 2024-11-11 RX ADMIN — TRIAMCINOLONE ACETONIDE 40 MG: 40 INJECTION, SUSPENSION INTRA-ARTICULAR; INTRAMUSCULAR at 11:47:00

## 2024-11-11 NOTE — PROGRESS NOTES
[facility] Progress Note    Subjective: Patient returns to clinic today for his left hip.  Since last being seen in November of last year, he states that the hip is largely done well, but over the past 6 weeks following a hospitalization for cardiac related issue, he states that the hip is really flared up.  He notes pain in the proximal lateral thigh that radiates into the groin.  It is worse with attempted motion as well as weightbearing.  He notes difficulty putting his on socks and shoes.  He states he is also having shoulder pain for which she is seeing Dr. Benton.  From the hip standpoint, he is limited with his ability to ambulate and has been using a cane.  It also bothers him from sleep and prevents him from getting sleep.    Objective: Patient is very pleasant.  Active and passive flexion of the hip with internal and external rotation does reproduce groin pain.  Nontender about the proximal lateral thigh.      Imaging: Updated x-rays of the left hip from last month personally viewed, independently interpreted and radiology report read.  Also compared to films from last year.  Demonstrates definite joint space narrowing, subchondral sclerosis, possible subchondral cyst and femoral head-neck junction, Kellgren Juan grade 3/moderate degenerative changes.      Assessment/Plan: 74-year-old male with symptomatic radiographically moderate left hip osteoarthritis.  Revisited the discussion of treatment options as was held last year.  Discussed medication use, physical therapy, injections.  Discussed the role of injection for both diagnostic and therapeutic purposes.  Also discussed hip replacement surgery.  He would like to hold off on surgery for now.  He is interested in injection.  This was performed in clinic today.  Advised this can be repeated as often as every 3 months as needed.  All questions were answered, he will contact us for repeat injection in 3+ months, or if he fails to get sufficient  relief.    Fili Hines MD, FAAOS  [facility] Orthopaedic Surgery  Phone [phone]  Fax [phone]

## 2024-11-11 NOTE — PROCEDURES
Risks and benefits of hip injection discussed with the patient, with risks including but not limited to pain and swelling at the injection site and/or within the hip joint, infection, elevation in blood pressure and/or glucose levels, facial flushing. After informed consent, the patient's left hip was marked, prepped with topical antiseptic, and locally anesthetized with skin refrigerant followed by injection of 1% lidocaine to create a skin wheal anteriorly at an insertion site a few fingerbreadths lateral to the femoral pulse, along the trajectory of the femoral neck.  Next, the area was re-prepped with topical antiseptic, and ultrasound guidance was performed to direct a 20 gauge spinal needle into the hip joint at the junction of the femoral head and neck, after which a mixture of 1mL 40mg/mL Kenalog, 2mL 1% lidocaine and 2mL 0.5% marcaine was injected while visualizing the fluid under ultrasound.  Confirmatory imaging was saved to the patient's chart.  A band-aid was applied.  The patient tolerated the procedure well.    Fili Hines MD, [facility]  [facility] Orthopedic Surgery  Phone [phone]  Fax [phone]

## 2024-11-15 ENCOUNTER — OFFICE VISIT (OUTPATIENT)
Dept: ORTHOPEDICS CLINIC | Facility: CLINIC | Age: 74
End: 2024-11-15
Payer: MEDICARE

## 2024-11-15 DIAGNOSIS — M75.02 ADHESIVE CAPSULITIS OF LEFT SHOULDER: ICD-10-CM

## 2024-11-15 DIAGNOSIS — M19.019 GLENOHUMERAL ARTHRITIS: Primary | ICD-10-CM

## 2024-11-15 RX ORDER — TRIAMCINOLONE ACETONIDE 40 MG/ML
40 INJECTION, SUSPENSION INTRA-ARTICULAR; INTRAMUSCULAR ONCE
Status: COMPLETED | OUTPATIENT
Start: 2024-11-15 | End: 2024-11-15

## 2024-11-15 RX ORDER — KETOROLAC TROMETHAMINE 30 MG/ML
30 INJECTION, SOLUTION INTRAMUSCULAR; INTRAVENOUS ONCE
Status: COMPLETED | OUTPATIENT
Start: 2024-11-15 | End: 2024-11-15

## 2024-11-15 RX ADMIN — TRIAMCINOLONE ACETONIDE 40 MG: 40 INJECTION, SUSPENSION INTRA-ARTICULAR; INTRAMUSCULAR at 12:10:00

## 2024-11-15 RX ADMIN — KETOROLAC TROMETHAMINE 30 MG: 30 INJECTION, SOLUTION INTRAMUSCULAR; INTRAVENOUS at 12:10:00

## 2024-11-15 NOTE — PROGRESS NOTES
Orthopaedic Surgery  [address] [place] [id number]  [phone]       Name: Nasir Beauchamp   MRN: [id number]  Date: 11/15/2024     REASON FOR VISIT: Follow up of left shoulder glenohumeral arthritis and adhesive capsulitis.     INTERVAL HISTORY:  Nasir Beauchamp is a 74 year old left-hand dominant male who presents today for repeat evaluation of left glenohumeral arthritis and adhesive capsulitis.    To summarize: left shoulder pain ongoing since August 2024. Denies any specific injuries. Pain is described as shooting. This improves with Tylenol temporarily. Reports difficulty sleeping at night.     We have been managing conservatively with physical therapy twice a week. Current pain is 6/10.      He enjoys golf. Lives with his spouse and is retired.       PE:   There were no vitals filed for this visit.  Estimated body mass index is 33.67 kg/m² as calculated from the following:    Height as of 11/11/24: 5' 7\" (1.702 m).    Weight as of 10/11/24: 215 lb.    Physical Exam  Constitutional:       Appearance: Normal appearance.   HENT:      Head: Normocephalic and atraumatic.   Eyes:      Extraocular Movements: Extraocular movements intact.   Neck:      Musculoskeletal: Normal range of motion and neck supple.   Cardiovascular:      Pulses: Normal pulses.   Pulmonary:      Effort: Pulmonary effort is normal. No respiratory distress.   Abdominal:      General: There is no distension.   Skin:     General: Skin is warm.      Capillary Refill: Capillary refill takes less than 2 seconds.      Findings: No bruising.   Neurological:      General: No focal deficit present.      Mental Status: She is alert.   Psychiatric:         Mood and Affect: Mood normal.     Examination of the left shoulder demonstrates:     Skin is intact, warm and dry.   Cervical:  Full ROM  Spurling's  Negative    Deformity:   none  Atrophy:   none    Scapular winging: Negative    Palpation:     AC Joint   Negative  Biceps  Tendon  Negative  Greater Tuberosity Negative    ROM:   Forward Flexion:  90°, assisted 150 degrees  Abduction:   full and symmetric  External Rotation:  45  Internal Rotation:  Back pocket    Rotator Cuff Strength:   Supraspinatus:   5/5  Subscapularis:   5/5  Infraspinatus/Teres: 5/5    Provocative Tests:   Andersen:   Negative  Speed's:   Negative  Dodge's:   Negative  Lift-off:    Negative  Apprehension:  Negative  Sulcus Sign:   Negative    Neurovascular Upper Extremity (Bilateral)  Motor:    5/5 EPL, Finger Abduction, , Pinch, Deltoid  Sensation:   intact to light touch median, ulnar, radial and axillary nerve  Circulation:   Normal, 2+ radial pulse    The contralateral upper extremity is without limitation in range of motion or strength, no positive provocative maneuvers.      Radiographic Examination/Diagnostics:    Shoulder XR personally viewed, independently interpreted and radiology report was reviewed.    XR SHOULDER, COMPLETE (MIN 2 VIEWS), LEFT (CPT=73030)    Result Date: 10/25/2024  PROCEDURE:  XR SHOULDER, COMPLETE (MIN 2 VIEWS), LEFT (CPT=73030)  TECHNIQUE:  Multiple views were obtained.  COMPARISON:  None.  INDICATIONS:  M25.512 Left shoulder pain, unspecified chronicity  PATIENT STATED HISTORY: (As transcribed by Technologist)  Ortho consult. Patient states chronic left shoulder pain, no known injury.    FINDINGS: No fracture or dislocation. Left glenohumeral joint space is well maintained.  Minimal hypertrophic changes of the left acromioclavicular joint.  Calcification adjacent to left greater tubercle may be due to calcific tendinopathy.     IMPRESSION: No fracture or dislocation.  Mild to moderate osteoarthritic changes of the left shoulder.   LOCATION:  [facility]   Dictated by (CST): Rolando Cintron MD on 10/25/2024 at 8:22 AM     Finalized by (CST): Rolando Cintron MD on 10/25/2024 at 8:23 AM         IMPRESSION: Nasir Beauchamp is a 74 year old with left glenohumeral arthritis and  adhesive capsulitis symptomatic since August 2024. Physical therapy has provided limited relief.      Treatment is amenable to an intra-articular corticosteroid and ketorolac injection. I discussed the role of future consideration of arthroplasty.     PLAN:   We reviewed the treatment of this disease condition.  Fortunately, treatment is amenable to conservative treatment which we chose to optimize at today's visit.  After a discussion of a variety of conservative treatment options, I recommended intra-articular injection with corticosteroid and ketorolac.       We elected to proceed with the injection procedure at today's visit. We discussed the risk and benefits of the procedure; including, but not limited to: infection, injury to blood vessels, nerve injury, prolonged pain, swelling, site soreness, failure to progress, and need for advanced treatments.  The patient voiced understanding and agreed to proceed with the treatment plan.      I spent 30 minutes in preparation to see the patient, counseling/education of relevant pathology, discussing imaging results, ordering therapy  intervention, care coordination, and documentation into the electronic medical record.      FOLLOW-UP:   Return to clinic on an as needed basis.       Julio Benton MD  Knee, Shoulder, & Elbow Surgery / Sports Medicine Specialist  Orthopaedic Surgery  [address] [place] [address].org  [email or web address]  t: [phone]  o: [phone]  f: [phone]    This note was dictated using Dragon software.  While it was briefly proofread prior to completion, some grammatical, spelling, and word choice errors due to dictation may still occur.

## 2024-11-15 NOTE — PROCEDURES
Left Shoulder Glenohumeral Joint Injection    Name: Nasir Beauchamp   MRN: [id number]  Date: 11/15/2024     Clinical Indications:   Shoulder Osteoarthritis with symptoms refractory to conservative measures.     After informed consent, the injection site was marked, sterilized with topical chlorhexidine antiseptic, and locally anesthetized with skin refrigerant.    The patient was seated upright and the shoulder was exposed. Using sterile technique: 1 mL of 30mg/mL of Ketorolac, 2 mL of 0.5% Bupivicaine, 2 mL of 1% Lidocaine, and 1 mg of 40mg/mL of Triamcinolone (Kenalog) was injected with a Anterior approach utilizing a 22 gauge needle.  A band-aid was applied.  The patient tolerated the procedure well.        Julio Benton MD  Knee, Shoulder, & Elbow Surgery / Sports Medicine Specialist  Orthopaedic Surgery  [address] [place] [address] [id number] [place] [address].[email or web address]  [email or web address]  t: [phone]  o: [phone]  f: [phone]

## 2024-11-20 ENCOUNTER — OFFICE VISIT (OUTPATIENT)
Dept: PHYSICAL THERAPY | Age: 74
End: 2024-11-20
Attending: ORTHOPAEDIC SURGERY
Payer: MEDICARE

## 2024-11-20 ENCOUNTER — PATIENT MESSAGE (OUTPATIENT)
Facility: CLINIC | Age: 74
End: 2024-11-20

## 2024-11-20 DIAGNOSIS — M16.12 PRIMARY OSTEOARTHRITIS OF LEFT HIP: Primary | ICD-10-CM

## 2024-11-20 PROCEDURE — 97110 THERAPEUTIC EXERCISES: CPT

## 2024-11-20 NOTE — PROGRESS NOTES
Diagnosis:   Associated DX:  Glenohumeral arthritis (M19.019)  Adhesive capsulitis of left shoulder (M75.02)       Insurance (Authorized # of Visits): Medicare            Authorizing Physician:  Julio Jack MD visit: none scheduled  Fall Risk: standard         Precautions: n/a             Subjective: Patient states he had got a cortizone injection in shoulder last Friday and felt a huge difference in his pain. Pain rating 0/10.    Objective:   Eval: L shoulder flexion 130 deg, abduction 83, IR (functional) T12, horizontal adduction mod loss painful  11/20/24 L shoulder flexion 140 deg, abduction 105, IR (functional) T12, horizontal adduction mod loss but no painful    Date: 11/20/24   TX#: 2/10   TherEx:  Reassessment of range of motion x 5 minutes  Horizontal adduction with overpressure 2 x 10  Seated scapular retraction x 10  Wall wash with towel 2 x 10  Supine flexion with cane 2 x 10  Chest press with stick 2 x 10  Seated B shoulder ER 2 x 10   HEP: repeated horizontal adduction with overpressure x 10 every 2-3 hours 11/20/24 wall wash    Assessment: Patient had significant improvement in shoulder pain after the injection he had 5 days ago. Continued with shoulder range of motion and strengthening to address mobility and strength.       Goals:   Pt will report improved ability to sleep without waking due to shoulder pain   Pt will improve shoulder flexion AROM to >140 degrees to be able to reach into overhead cabinets without pain or restriction   Pt will improve shoulder abduction AROM to >120 degrees to improve ability to don deodorant, don/doff shirts, and wash hair   Pt will be independent and compliant with comprehensive HEP to maintain progress achieved in PT     Plan: Re-assess next visit and continue with L shoulder ROM, postural correction, HEP progression, patient education, and postural stability and strengthening exercises.      Charges: 3TE       Total Timed Treatment: 45 min  Total  Treatment Time: 45 min

## 2024-11-21 NOTE — TELEPHONE ENCOUNTER
Please see patient request for physical therapy   Patient last seen 11/11/24 for left hip injection  Patient uses [facility] ordered pended    Please Edit/Sign order if appropriate

## 2024-11-22 ENCOUNTER — APPOINTMENT (OUTPATIENT)
Dept: PHYSICAL THERAPY | Age: 74
End: 2024-11-22
Attending: ORTHOPAEDIC SURGERY
Payer: MEDICARE

## 2024-11-25 RX ORDER — FLUTICASONE FUROATE, UMECLIDINIUM BROMIDE AND VILANTEROL TRIFENATATE 100; 62.5; 25 UG/1; UG/1; UG/1
1 POWDER RESPIRATORY (INHALATION) DAILY
Qty: 180 EACH | Refills: 3 | Status: SHIPPED | OUTPATIENT
Start: 2024-11-25

## 2024-11-25 NOTE — TELEPHONE ENCOUNTER
Pt last seen by Dr. Stokes on 10-11-24.  Per office visit notes:  he wishes to keep trelegy 100 every other day for now but may trial off the inhaler altogether once he finishes his supply.  Pt advised to follow up in one year.  Appointment scheduled for 10-14-25.  Refill for Trelegy sent.

## 2024-11-26 ENCOUNTER — TELEPHONE (OUTPATIENT)
Facility: CLINIC | Age: 74
End: 2024-11-26

## 2024-11-26 NOTE — TELEPHONE ENCOUNTER
Returned patient's call.  Refill was sent in for Trelegy yesterday 11/26/24.  Call made to [facility] and they had to order the medication.  Patient informed to follow up with [facility].

## 2024-11-27 ENCOUNTER — APPOINTMENT (OUTPATIENT)
Dept: PHYSICAL THERAPY | Age: 74
End: 2024-11-27
Attending: ORTHOPAEDIC SURGERY
Payer: MEDICARE

## 2024-11-29 ENCOUNTER — APPOINTMENT (OUTPATIENT)
Dept: PHYSICAL THERAPY | Age: 74
End: 2024-11-29
Attending: ORTHOPAEDIC SURGERY
Payer: MEDICARE

## 2024-12-01 ENCOUNTER — PATIENT MESSAGE (OUTPATIENT)
Dept: INTERNAL MEDICINE CLINIC | Facility: CLINIC | Age: 74
End: 2024-12-01

## 2024-12-01 DIAGNOSIS — D72.829 LEUKOCYTOSIS, UNSPECIFIED TYPE: ICD-10-CM

## 2024-12-01 DIAGNOSIS — E11.9 DIET-CONTROLLED TYPE 2 DIABETES MELLITUS (HCC): Primary | ICD-10-CM

## 2024-12-02 ENCOUNTER — OFFICE VISIT (OUTPATIENT)
Dept: PHYSICAL THERAPY | Age: 74
End: 2024-12-02
Attending: ORTHOPAEDIC SURGERY
Payer: MEDICARE

## 2024-12-02 PROCEDURE — 97110 THERAPEUTIC EXERCISES: CPT

## 2024-12-02 NOTE — PROGRESS NOTES
Diagnosis:   Associated DX:  Glenohumeral arthritis (M19.019)  Adhesive capsulitis of left shoulder (M75.02)       Insurance (Authorized # of Visits): Medicare            Authorizing Physician:  Julio Jack MD visit: none scheduled  Fall Risk: standard         Precautions: n/a             Subjective: \" My left shoulder feels 100 % better since I got my injection \".    Objective:   Eval: L shoulder flexion 130 deg, abduction 83, IR (functional) T12, horizontal adduction mod loss painful  11/20/24 L shoulder flexion 140 deg, abduction 105, IR (functional) T12, horizontal adduction mod loss but no painful    Date: 11/20/24   TX#: 2/10 Date ; 12/02/24  TX # 3/10   TherEx:  Reassessment of range of motion x 5 minutes  Horizontal adduction with overpressure 2 x 10  Seated scapular retraction x 10  Wall wash with towel 2 x 10  Supine flexion with cane 2 x 10  Chest press with stick 2 x 10  Seated B shoulder ER 2 x 10 TherEx :  UBE x 6 min , level 1  Standing :  Wall slides :  Flex x 10  Scaption x 10  Horizontal abd x 10   Circles x 10 reps each   AAROM with yellow cane with focus on end range stretch :  Flex x 10  Abduction x 10   Ext x 10   ER x 10   IR AROM x 10  Rows with YTB 2 x 10   B sh ext YTB 2 x 10   Horizontal abd with YTB 2 x 10   ER/IR with YTB 2 x 10   Supine :  Alt sh flex from 90 deg to OH with 1 lbs 2 x 10   Serratus punches with 1 lbs 2 x 10   Horizontal abd with 1 lbs 2 x 10   Sh flex D1/D2 wit 1 lbs   2 x 10   Side lying :  ER with 1 lbs 2 x 10   Scaption with 1 lbs 2 x 10   Horizontal abd with 1 lbs 2 x 10    HEP: repeated horizontal adduction with overpressure x 10 every 2-3 hours 11/20/24 wall wash    Assessment: initiated strength ex's for left shoulder and scapula for improved anterior and posterior shoulder stability with OH lifting . Instructed patient on correct scapula recruitment to ensure proper form . Patient was able to complete all given activities without report of shoulder pain  .      Goals:   Pt will report improved ability to sleep without waking due to shoulder pain   Pt will improve shoulder flexion AROM to >140 degrees to be able to reach into overhead cabinets without pain or restriction   Pt will improve shoulder abduction AROM to >120 degrees to improve ability to don deodorant, don/doff shirts, and wash hair   Pt will be independent and compliant with comprehensive HEP to maintain progress achieved in PT     Plan: as per PT/PTA collaboration and patient agreement with anticipate discharge after next session .      Charges: 3TE       Total Timed Treatment: 45 min  Total Treatment Time: 45 min

## 2024-12-04 ENCOUNTER — OFFICE VISIT (OUTPATIENT)
Dept: PHYSICAL THERAPY | Age: 74
End: 2024-12-04
Attending: ORTHOPAEDIC SURGERY
Payer: MEDICARE

## 2024-12-04 ENCOUNTER — APPOINTMENT (OUTPATIENT)
Dept: PHYSICAL THERAPY | Age: 74
End: 2024-12-04
Attending: ORTHOPAEDIC SURGERY
Payer: MEDICARE

## 2024-12-04 PROCEDURE — 97110 THERAPEUTIC EXERCISES: CPT

## 2024-12-04 NOTE — TELEPHONE ENCOUNTER
Protocol passed    Requesting metformin 500mg  LOV: 09/20/24  RTC: no follow -ups on file  Last Relevant Labs: none for LS09/12/24  Filled: 09/09/24 #90 with 0 refills    Future Appointments   Date Time Provider Department Center   12/4/2024  3:00 PM Marion Beckford PTA [facility]   12/9/2024  9:15 AM Sharee Garcia, PT, DPT, Cert.MDT [facility]   12/11/2024  8:00 AM Staci Miguel MD [facility] [place]   12/13/2024  7:30 AM MICHELA BARROSO [id number] None   12/17/2024  2:00 PM Olga Rojas APRN [facility] [id number] [place]   12/26/2024  3:45 PM Sharee Garcia, PT, DPT, Cert.MIKI [facility]   1/2/2025 11:00 AM Marion Beckford PTA [facility]   1/6/2025 11:00 AM Marion Beckford PTA [facility]   1/8/2025  1:30 PM Sharee Garcia, PT, DPT, Cert.MIKI [facility]   1/13/2025 11:00 AM Marion Beckford PTA [facility]   1/16/2025  1:30 PM Sharee Garcia, PT, DPT, Cert.MDT [facility]   1/21/2025  9:15 AM Sharee Garcia, PT, DPT, Cert.MDT [facility]   10/14/2025  8:20 AM Chase Stokes MD [facility] [id number] [facility] [place]

## 2024-12-04 NOTE — PROGRESS NOTES
Diagnosis:   Associated DX:  Glenohumeral arthritis (M19.019)  Adhesive capsulitis of left shoulder (M75.02)       Insurance (Authorized # of Visits): Medicare            Authorizing Physician:  Julio Jack MD visit: none scheduled  Fall Risk: standard         Precautions: n/a             Subjective: \" My left shoulder feels 100 % better since I got my injection . I am able to perform all of my ADL's and reach up , out and OH without shoulder pain and with ease . I am able to sleep all night without walking up from pain \".    Objective:     Measurement Date: Date:11/01/2024   Date : 12/04/2024    AROM   Right/              Left  Right                  Left    Shoulder flexion  118  deg             130 deg       130 deg           150 deg    Shoulder abduction  110  deg              83 deg 130 deg            140 deg    Shoulder External rotation  65  deg              70 deg   70 deg              75 deg    Shoulder Internal rotation  T 12                   T12   T 10                  T 10 deg   Shoulder MMT   Right                  Left     Right             Left    flexion     4/5                     4/5    5/5                5/5    abduction     4/5                      4/5    5/5                5/5    ER      4+/5                   4+/5     5/5                5/5    IR      4+/5                  4+/5     5/5                 5/5        Assessment :  Mrs Jett demonstrates improvement in bilateral shoulder AROM and strength since the start of PT . Patient is able to perform all of his ADL's , get dressed , reach behind his back and reach up , out and over head with ease and without pain and shoulder tightness . Patient demonstrates compliance in his comprehensive HEP .    Date: 11/20/24   TX#: 2/10 Date ; 12/02/24  TX # 3/10 Date : 12/04/24  TX # 4/10    TherEx:  Reassessment of range of motion x 5 minutes  Horizontal adduction with overpressure 2 x 10  Seated scapular retraction x 10  Wall wash with towel 2 x  10  Supine flexion with cane 2 x 10  Chest press with stick 2 x 10  Seated B shoulder ER 2 x 10 TherEx :  UBE x 6 min , level 1  Standing :  Wall slides :  Flex x 10  Scaption x 10  Horizontal abd x 10   Circles x 10 reps each   AAROM with yellow cane with focus on end range stretch :  Flex x 10  Abduction x 10   Ext x 10   ER x 10   IR AROM x 10  Rows with YTB 2 x 10   B sh ext YTB 2 x 10   Horizontal abd with YTB 2 x 10   ER/IR with YTB 2 x 10   Supine :  Alt sh flex from 90 deg to OH with 1 lbs 2 x 10   Serratus punches with 1 lbs 2 x 10   Horizontal abd with 1 lbs 2 x 10   Sh flex D1/D2 wit 1 lbs   2 x 10   Side lying :  ER with 1 lbs 2 x 10   Scaption with 1 lbs 2 x 10   Horizontal abd with 1 lbs 2 x 10  TherEx :  UBE x 6 min , level 1  HEP PERFORMANCE AND EDUCATION :  Wall slides :  Flex 2 x 10   Scaption 2 x 10   Circles x 10 reps reach side   Rows with RTB 2 x 10   B sh ext with RTB 2 x 10  R/L ER with RTB 2 x 10   Seated :  Both sh flexion with 1 lbs 2 x 10   Both sh scaption with 1 lbs 2 x 10   Both sh abduction with 1 lbs 2 x 10            Goals:   Pt will report improved ability to sleep without waking due to shoulder pain - ACHIEVED   Pt will improve shoulder flexion AROM to >140 degrees to be able to reach into overhead cabinets without pain or restriction - ACHIEVED   Pt will improve shoulder abduction AROM to >120 degrees to improve ability to don deodorant, don/doff shirts, and wash hair - ACHIEVED   Pt will be independent and compliant with comprehensive HEP to maintain progress achieved in PT - ACHIEVED     Plan: as per PT/PTA collaboration and patient agreement PT will be discontinued after this visit .    Charges: 3TE       Total Timed Treatment: 45 min  Total Treatment Time: 45 min

## 2024-12-09 ENCOUNTER — LAB ENCOUNTER (OUTPATIENT)
Dept: LAB | Age: 74
End: 2024-12-09
Attending: INTERNAL MEDICINE
Payer: MEDICARE

## 2024-12-09 ENCOUNTER — OFFICE VISIT (OUTPATIENT)
Dept: PHYSICAL THERAPY | Age: 74
End: 2024-12-09
Attending: ORTHOPAEDIC SURGERY
Payer: MEDICARE

## 2024-12-09 DIAGNOSIS — D69.6 THROMBOCYTOPENIA (HCC): Primary | ICD-10-CM

## 2024-12-09 DIAGNOSIS — D72.829 LEUKOCYTOSIS, UNSPECIFIED TYPE: ICD-10-CM

## 2024-12-09 DIAGNOSIS — M16.12 PRIMARY OSTEOARTHRITIS OF LEFT HIP: Primary | ICD-10-CM

## 2024-12-09 LAB
ALBUMIN SERPL-MCNC: 4.1 G/DL (ref 3.2–4.8)
ALBUMIN/GLOB SERPL: 1.5 {RATIO} (ref 1–2)
ALP LIVER SERPL-CCNC: 53 U/L
ALT SERPL-CCNC: 15 U/L
ANION GAP SERPL CALC-SCNC: 5 MMOL/L (ref 0–18)
AST SERPL-CCNC: 21 U/L (ref ?–34)
BASOPHILS # BLD AUTO: 0.03 X10(3) UL (ref 0–0.2)
BASOPHILS NFR BLD AUTO: 0.4 %
BILIRUB SERPL-MCNC: 0.8 MG/DL (ref 0.2–1.1)
BUN BLD-MCNC: 18 MG/DL (ref 9–23)
CALCIUM BLD-MCNC: 9.4 MG/DL (ref 8.7–10.4)
CHLORIDE SERPL-SCNC: 106 MMOL/L (ref 98–112)
CHOLEST SERPL-MCNC: 103 MG/DL (ref ?–200)
CO2 SERPL-SCNC: 29 MMOL/L (ref 21–32)
CREAT BLD-MCNC: 1.16 MG/DL
EGFRCR SERPLBLD CKD-EPI 2021: 65 ML/MIN/1.73M2 (ref 60–?)
EOSINOPHIL # BLD AUTO: 0.22 X10(3) UL (ref 0–0.7)
EOSINOPHIL NFR BLD AUTO: 2.8 %
ERYTHROCYTE [DISTWIDTH] IN BLOOD BY AUTOMATED COUNT: 18.1 %
FASTING PATIENT LIPID ANSWER: YES
FASTING STATUS PATIENT QL REPORTED: YES
GLOBULIN PLAS-MCNC: 2.8 G/DL (ref 2–3.5)
GLUCOSE BLD-MCNC: 95 MG/DL (ref 70–99)
HCT VFR BLD AUTO: 39.8 %
HDLC SERPL-MCNC: 33 MG/DL (ref 40–59)
HGB BLD-MCNC: 12.8 G/DL
IMM GRANULOCYTES # BLD AUTO: 0.03 X10(3) UL (ref 0–1)
IMM GRANULOCYTES NFR BLD: 0.4 %
LDLC SERPL CALC-MCNC: 47 MG/DL (ref ?–100)
LYMPHOCYTES # BLD AUTO: 2.16 X10(3) UL (ref 1–4)
LYMPHOCYTES NFR BLD AUTO: 27 %
MCH RBC QN AUTO: 27.2 PG (ref 26–34)
MCHC RBC AUTO-ENTMCNC: 32.2 G/DL (ref 31–37)
MCV RBC AUTO: 84.7 FL
MONOCYTES # BLD AUTO: 0.87 X10(3) UL (ref 0.1–1)
MONOCYTES NFR BLD AUTO: 10.9 %
NEUTROPHILS # BLD AUTO: 4.68 X10 (3) UL (ref 1.5–7.7)
NEUTROPHILS # BLD AUTO: 4.68 X10(3) UL (ref 1.5–7.7)
NEUTROPHILS NFR BLD AUTO: 58.5 %
NONHDLC SERPL-MCNC: 70 MG/DL (ref ?–130)
OSMOLALITY SERPL CALC.SUM OF ELEC: 292 MOSM/KG (ref 275–295)
PLATELET # BLD AUTO: 146 10(3)UL (ref 150–450)
POTASSIUM SERPL-SCNC: 4.9 MMOL/L (ref 3.5–5.1)
PROT SERPL-MCNC: 6.9 G/DL (ref 5.7–8.2)
RBC # BLD AUTO: 4.7 X10(6)UL
SODIUM SERPL-SCNC: 140 MMOL/L (ref 136–145)
TRIGL SERPL-MCNC: 128 MG/DL (ref 30–149)
VLDLC SERPL CALC-MCNC: 18 MG/DL (ref 0–30)
WBC # BLD AUTO: 8 X10(3) UL (ref 4–11)

## 2024-12-09 PROCEDURE — 97110 THERAPEUTIC EXERCISES: CPT

## 2024-12-09 PROCEDURE — 36415 COLL VENOUS BLD VENIPUNCTURE: CPT

## 2024-12-09 PROCEDURE — 85025 COMPLETE CBC W/AUTO DIFF WBC: CPT

## 2024-12-09 PROCEDURE — 97162 PT EVAL MOD COMPLEX 30 MIN: CPT

## 2024-12-09 PROCEDURE — 80061 LIPID PANEL: CPT | Performed by: INTERNAL MEDICINE

## 2024-12-09 PROCEDURE — 80053 COMPREHEN METABOLIC PANEL: CPT | Performed by: INTERNAL MEDICINE

## 2024-12-09 NOTE — PROGRESS NOTES
LOWER EXTREMITY EVALUATION:     Diagnosis:   Primary osteoarthritis of left hip (M16.12)       Referring Provider: Julio Benton  Date of Evaluation:    12/9/2024    Precautions:  None Next MD visit:   none scheduled  Date of Surgery: n/a     PATIENT SUMMARY   Nasir Beauchamp is a 74 year old male who presents to therapy today with complaints of left hip. Patient states that the left hip started bothering him in August 2024 (4 month ago). Patient does not recall a mechanism of injury. Patient had an injection in hip left hip 1 month ago. No change in symptoms after injection.  Does not have pain traveling down leg. Is occasionally using cane now. No falls.   Pt describes pain level current 1/10, at best 4/10, at worst 9/10.   Pain location: left lateral and anterior hip; L glute ; Pain Description aching   Status (Improving/ unchanging/ worsening AND constant or intermittent): \"almost always sore\"  Aggravating factors: walking, standing, putting on shoes and socks, laying on left side  Alleviating factors: Medication, pillow between knees, better in the AM  Night pain: only when laying on side  Numbness and Tingling: none  Occupation: Retired   Current functional limitations include walking, stairs.   Nasir describes prior level of function No functional limitations prior to August 2024. Patient does have history of left hip pain and did feel better with therapy a year ago. Pt goals include to alleviate pain.  Past medical history was reviewed with Nasir. Significant findings include  has a past medical history of Abdominal hernia (1973 and 2001), ALCOHOL USE, Arthritis, Back pain (2016), Bronchitis (03/2023), Chronic left-sided low back pain, Diabetes (HCC), Easy bruising (2019), Elevated lipase (11/25/2015), Flatulence/gas pain/belching (Randomly), Gout, Hernia, abdominal (1973), Left knee pain, Obesity, Pacemaker (02/2018), Pneumonia due to organism (Apr 2023), Pulmonary emphysema (HCC) (Mother), and  Wears glasses (Reading glasses only).    ASSESSMENT  Nasir presents to physical therapy evaluation with primary c/o L hip pain. The results of the objective tests and measures show decreased lower extremity range of motion, decreased lower extremity strength, tenderness to palpation and gait contributing to symptoms.  Functional deficits include but are not limited to walking and standing. Pt and PT discussed evaluation findings, pathology, POC and HEP.  Pt voiced understanding and performs HEP correctly without reported pain. Skilled Physical Therapy is medically necessary to address the above impairments and reach functional goals.     OBJECTIVE:   Observation: shifted to right when sitting   Palpation: TTP anterior L hip  Sensation: WNL  Lumbar ROM: Flexion mod loss; extension major loss   AROM: (* denotes performed with pain)  Hip Knee   Flexion: R WNL; L min loss  ER: R 45 deg; L 25 deg  IR: R 15 deg; L 10 deg Flexion: R WNL; L WNL  Extension: R WNL; L WNL      Flexibility:  Hip Flexor: R WNL, L decreased  Hamstrings: R decreased; L decreased  Piriformis: R WNL; L decreased  Strength/MMT: (* denotes performed with pain)  Hip Knee   Flexion: R 4+/5; L 4-/5  Abduction: R 4+/5; L 4/5  ER: R 4+/5; L 3+/5  IR: R 4+/5; L 3-/5 Flexion: R 5/5; L 5/5  Extension: R 5/5; L 5/5        Gait: pt ambulates on level ground with antalgia and walking cautiously       Today’s Treatment and Response:   Pt education was provided on exam findings, treatment diagnosis, treatment plan, expectations, and prognosis. Pt was also provided recommendations for pain science education  and importance of remaining active.  Patient was instructed in and issued a HEP for: supine hip flexor stretch, supine hip ER stretch, piriformis stretch (all 3 x 30 seconds, 3 times a day)    Charges: PT Eval Moderate Complexity, 1TE      Total Timed Treatment: 10 min     Total Treatment Time: 45 min     Based on clinical rationale and outcome measures, this  evaluation involved Moderate Complexity decision making due to 3+ personal factors/comorbidities, 3 body structures involved/activity limitations, and evolving symptoms including changing pain levels.  PLAN OF CARE:    Goals: (to be met in 8 visits)  Pt will improve hip ABD and ER strength to 4/5 to increase ease with standing and walking   Pt will be able to put on socks with <2/10 hip pain   Pt will improve functional hip strength to report ability to ascend/descend 1 flight of stairs reciprocally without use of handrail   Pt will be independent and compliant with comprehensive HEP to maintain progress achieved in PT       Frequency / Duration: Patient will be seen for 2 x/week or a total of 8 visits over a 90 day period. Treatment will include: Gait training, Manual Therapy, Neuromuscular Re-education, Self-Care Home Management, Therapeutic Activities, Therapeutic Exercise, and Home Exercise Program instruction    Education or treatment limitation: None  Rehab Potential:excellent    QuickDASH Outcome Score  Score: (Patient-Rptd) 11.36 % (12/3/2024  3:56 PM)      Patient/Family/Caregiver was advised of these findings, precautions, and treatment options and has agreed to actively participate in planning and for this course of care.    Thank you for your referral. Please co-sign or sign and return this letter via fax as soon as possible to [phone]. If you have any questions, please contact me at Dept: [phone]    Sincerely,  Electronically signed by therapist: Sharee Garcia, PT, DPT, Cert.MDT  Physician's certification required: Yes  I certify the need for these services furnished under this plan of treatment and while under my care.    X___________________________________________________ Date____________________    Certification From: 12/9/2024  To:3/9/2025

## 2024-12-09 NOTE — PROGRESS NOTES
Discharge Summary  Pt has attended 4 visits in Physical Therapy.    Diagnosis:   Associated DX:  Glenohumeral arthritis (M19.019)  Adhesive capsulitis of left shoulder (M75.02)       Insurance (Authorized # of Visits): Medicare            Authorizing Physician:  Julio Jack MD visit: none scheduled  Fall Risk: standard         Precautions: n/a             Subjective: \" My left shoulder feels 100 % better since I got my injection . I am able to perform all of my ADL's and reach up , out and OH without shoulder pain and with ease . I am able to sleep all night without walking up from pain \".    Objective:     Measurement Date: Date:11/01/2024   Date : 12/04/2024    AROM   Right/              Left  Right                  Left    Shoulder flexion  118  deg             130 deg       130 deg           150 deg    Shoulder abduction  110  deg              83 deg 130 deg            140 deg    Shoulder External rotation  65  deg              70 deg   70 deg              75 deg    Shoulder Internal rotation  T 12                   T12   T 10                  T 10 deg   Shoulder MMT   Right                  Left     Right             Left    flexion     4/5                     4/5    5/5                5/5    abduction     4/5                      4/5    5/5                5/5    ER      4+/5                   4+/5     5/5                5/5    IR      4+/5                  4+/5     5/5                 5/5        Assessment :  Mrs Jett demonstrates improvement in bilateral shoulder AROM and strength since the start of PT . Patient is able to perform all of his ADL's , get dressed , reach behind his back and reach up , out and over head with ease and without pain and shoulder tightness . Patient demonstrates compliance in his comprehensive HEP .    Date: 11/20/24   TX#: 2/10 Date ; 12/02/24  TX # 3/10 Date : 12/04/24  TX # 4/10    TherEx:  Reassessment of range of motion x 5 minutes  Horizontal adduction with  overpressure 2 x 10  Seated scapular retraction x 10  Wall wash with towel 2 x 10  Supine flexion with cane 2 x 10  Chest press with stick 2 x 10  Seated B shoulder ER 2 x 10 TherEx :  UBE x 6 min , level 1  Standing :  Wall slides :  Flex x 10  Scaption x 10  Horizontal abd x 10   Circles x 10 reps each   AAROM with yellow cane with focus on end range stretch :  Flex x 10  Abduction x 10   Ext x 10   ER x 10   IR AROM x 10  Rows with YTB 2 x 10   B sh ext YTB 2 x 10   Horizontal abd with YTB 2 x 10   ER/IR with YTB 2 x 10   Supine :  Alt sh flex from 90 deg to OH with 1 lbs 2 x 10   Serratus punches with 1 lbs 2 x 10   Horizontal abd with 1 lbs 2 x 10   Sh flex D1/D2 wit 1 lbs   2 x 10   Side lying :  ER with 1 lbs 2 x 10   Scaption with 1 lbs 2 x 10   Horizontal abd with 1 lbs 2 x 10  TherEx :  UBE x 6 min , level 1  HEP PERFORMANCE AND EDUCATION :  Wall slides :  Flex 2 x 10   Scaption 2 x 10   Circles x 10 reps reach side   Rows with RTB 2 x 10   B sh ext with RTB 2 x 10  R/L ER with RTB 2 x 10   Seated :  Both sh flexion with 1 lbs 2 x 10   Both sh scaption with 1 lbs 2 x 10   Both sh abduction with 1 lbs 2 x 10            Goals:   Pt will report improved ability to sleep without waking due to shoulder pain - ACHIEVED   Pt will improve shoulder flexion AROM to >140 degrees to be able to reach into overhead cabinets without pain or restriction - ACHIEVED   Pt will improve shoulder abduction AROM to >120 degrees to improve ability to don deodorant, don/doff shirts, and wash hair - ACHIEVED   Pt will be independent and compliant with comprehensive HEP to maintain progress achieved in PT - ACHIEVED     Plan: as per PT/PTA collaboration and patient agreement PT will be discontinued after this visit .    Charges: 3TE       Total Timed Treatment: 45 min  Total Treatment Time: 45 min    QuickDASH Outcome Score  Score: (Patient-Rptd) 11.36 % (12/3/2024  3:56 PM)      Plan:     Patient/Family/Caregiver was advised of  these findings, precautions, and treatment options and has agreed to actively participate in planning and for this course of care.    Thank you for your referral. If you have any questions, please contact me at Dept: [phone].    Sincerely,  Electronically signed by therapist: Sharee Garcia, PT, DPT, Cert.MDT     Physician's certification required:  Yes  Please co-sign or sign and return this letter via fax as soon as possible to [phone].   I certify the need for these services furnished under this plan of treatment and while under my care.    X___________________________________________________ Date____________________    Certification From: 12/9/2024  To:3/9/2025

## 2024-12-11 ENCOUNTER — OFFICE VISIT (OUTPATIENT)
Dept: INTERNAL MEDICINE CLINIC | Facility: CLINIC | Age: 74
End: 2024-12-11
Payer: MEDICARE

## 2024-12-11 VITALS
HEART RATE: 70 BPM | HEIGHT: 65.7 IN | WEIGHT: 206.19 LBS | SYSTOLIC BLOOD PRESSURE: 106 MMHG | DIASTOLIC BLOOD PRESSURE: 60 MMHG | TEMPERATURE: 98 F | BODY MASS INDEX: 33.54 KG/M2 | OXYGEN SATURATION: 98 %

## 2024-12-11 DIAGNOSIS — J41.0 SIMPLE CHRONIC BRONCHITIS (HCC): ICD-10-CM

## 2024-12-11 DIAGNOSIS — I65.23 BILATERAL CAROTID ARTERY STENOSIS: ICD-10-CM

## 2024-12-11 DIAGNOSIS — I73.9 PVD (PERIPHERAL VASCULAR DISEASE) (HCC): ICD-10-CM

## 2024-12-11 DIAGNOSIS — R32 URINARY INCONTINENCE, UNSPECIFIED TYPE: ICD-10-CM

## 2024-12-11 DIAGNOSIS — H81.09 MENIERE'S DISEASE, UNSPECIFIED LATERALITY: ICD-10-CM

## 2024-12-11 DIAGNOSIS — Z95.0 PRESENCE OF CARDIAC PACEMAKER: ICD-10-CM

## 2024-12-11 DIAGNOSIS — Z00.00 WELLNESS EXAMINATION: Primary | ICD-10-CM

## 2024-12-11 DIAGNOSIS — E11.9 TYPE 2 DIABETES MELLITUS WITHOUT COMPLICATION, WITHOUT LONG-TERM CURRENT USE OF INSULIN (HCC): ICD-10-CM

## 2024-12-11 LAB — HEMOGLOBIN A1C: 6 % (ref 4.3–5.6)

## 2024-12-11 NOTE — PROGRESS NOTES
Subjective:   Nasir Beauchamp is a 74 year old male who presents for a Subsequent Annual Wellness visit (Pt already had Initial Annual Wellness) and scheduled follow up of multiple significant but stable problems.     Trelegy last dose was Nov 23   He has not noticed any difference in his breathing. He notes he would lose his voice in the middle of the day and that's why he wanted to stop it   He checks his O2 at home ranging 97-99  Follows with Dr. Stokes     He is in PT for his hip  He had cortisone injections in his hip and his shoulder    Tinnitus is bothersome. He asks for a ENT specializing in tinnitus     He has a colonoscopy scheduled   History of colon polyps    Glu averages before breakfast 114  Before lunch 112  After lunch 157  Before dinner 148  After dinner 169     Urinary incontinence  Sometimes he barely gets to the bathroom in town     Blood work results from 12/9/24:   WBC came back into normal range  Hgb was slightly low at 12.8, normal 13  Plt came down to 146, previously normal    Due for A1c =6.0   On metformin once daily   He had eye exam in Oct at [facility]    He received flu shot  Due for shingrix  Declines covid vaccine         History/Other:   Fall Risk Assessment:   He has been screened for Falls and is low risk.      Cognitive Assessment:   He had a completely normal cognitive assessment - see flowsheet entries     Functional Ability/Status:   Nasir Beauchamp has some abnormal functions as listed below:  He has Dressing and/or Bathing issues based on screening of functional status.  Difficulty dressing or bathing?: (Patient-Rptd) Yes  Bathing or Showering: (Patient-Rptd) Able without help  Dressing: (Patient-Rptd) Need some help  He has Hearing problems based on screening of functional status.He has Vision problems based on screening of functional status. He has Walking problems based on screening of functional status. He has problems with Daily Activities based on screening of  functional status.       Depression Screening (PHQ):  PHQ-2 SCORE: 0  , done 12/5/2024   Last [facility]is Suicide Screening on 12/11/2024 was No Risk.       Advanced Directives:   He does have a Living Will but we do NOT have it on file in Epic.    He does have a POA but we do NOT have it on file in [facility].    Patient has Advance Care Planning documents but we do not have a copy in EMR. Discussed Advanced Care Planning with patient and instructed patient to get our office a copy to be scanned into EMR.      Patient Active Problem List   Diagnosis    Rotator cuff syndrome of left shoulder    History of smoking 25-50 pack years    Anemia    Vertigo    Carotid disease, bilateral (HCC)    Meniere's disease    Presence of cardiac pacemaker    PVD (peripheral vascular disease) ([facility])    RBBB (right bundle branch block with left anterior fascicular block)    Diet-controlled type 2 diabetes mellitus (HCC)    Left knee pain    Easy bruising    Chronic left-sided low back pain    Arthritis    Abdominal hernia    Right inguinal hernia    Dyspnea on exertion    Chest pain, atypical     Allergies:  He has No Known Allergies.    Current Medications:  Outpatient Medications Marked as Taking for the 12/11/24 encounter (Office Visit) with Staci Miguel MD   Medication Sig    metFORMIN 500 MG Oral Tab TAKE 1 TABLET(500 MG) BY MOUTH DAILY WITH BREAKFAST    magnesium oxide 400 MG Oral Tab Take 1 tablet (400 mg total) by mouth daily.    aspirin 81 MG Oral Tab EC Take 1 tablet (81 mg total) by mouth daily.    brimonidine 0.2 % Ophthalmic Solution Place 1 drop into the right eye Q12H.    rosuvastatin 10 MG Oral Tab TAKE 1 TABLET(10 MG) BY MOUTH EVERY NIGHT    Misc Natural Products (GLUCOSAMINE CHONDROITIN ADV) Oral Tab Take 1 tablet by mouth daily.    latanoprost 0.005 % Ophthalmic Solution Place 1 drop into both eyes nightly.    Multiple Vitamins-Minerals (PRESERVISION AREDS 2) Oral Cap take two tablets by mouth daily       Medical  History:  He  has a past medical history of Abdominal hernia ( and ), ALCOHOL USE, Arthritis, Back pain (), Bronchitis (2023), Chronic left-sided low back pain, Diabetes (), Easy bruising (), Elevated lipase (2015), Flatulence/gas pain/belching (Randomly), Gout, Hernia, abdominal (), Left knee pain, Obesity, Pacemaker (2018), Pneumonia due to organism (2023), Pulmonary emphysema (HCC) (Mother), and Wears glasses (Reading glasses only).  Surgical History:  He  has a past surgical history that includes repair ing hernia,5+y/o,reducibl; hernia surgery (Right, ); hernia surgery (Bilateral, ); repair of nasal septum (N/A, ); LASIK (Bilateral, ); dental surgery procedure (); colonoscopy (); Cardiac pacemaker placement (); cataract; colonoscopy (N/A, 10/08/2021); pacemaker/defibrillator; other surgical history; tonsillectomy (); and vasectomy ().   Family History:  His family history includes Asthma in his mother; Other in his father and mother; Pulmonary Disease in his mother.  Social History:  He  reports that he quit smoking about 7 years ago. His smoking use included cigarettes. He started smoking about 59 years ago. He has a 52.8 pack-year smoking history. He has been exposed to tobacco smoke. He has never used smokeless tobacco. He reports current alcohol use of about 1.0 standard drink of alcohol per week. He reports that he does not use drugs.    Tobacco:  He smoked tobacco in the past but quit greater than 12 months ago.  Social History     Tobacco Use   Smoking Status Former    Current packs/day: 0.00    Average packs/day: 1 pack/day for 52.8 years (52.8 ttl pk-yrs)    Types: Cigarettes    Start date: 1965    Quit date: 10/30/2017    Years since quittin.1    Passive exposure: Past (pt's parents both smoked in the home)   Smokeless Tobacco Never   Tobacco Comments    Updated 24        CAGE Alcohol Screen:   CAGE screening  score of 0 on 12/5/2024, showing low risk of alcohol abuse.      Patient Care Team:  Staci Miguel MD as PCP - General (Internal Medicine)  Brittney Potter, RD,LDN,CDE (Dietitian)  Stewart Tolentino MD (GASTROENTEROLOGY)  Alireza Robison PA (Physician Assistant)  Jody Escobedo, PT as Physical Therapist  Юлия Mondragon, PT as Physical Therapist  Ivelisse Cates PA (Physician Assistant)  Flakita Rosales MD (Cardiovascular Diseases)  Sharee Garcia, PT, DPT, Cert.MDT as Physical Therapist (Physical Therapy)  Marion Beckford PTA as Physical Therapy Assistant (Physical Therapy)    Review of Systems   Constitutional:  Negative for fever.   Eyes:  Negative for visual disturbance.   Respiratory:  Negative for shortness of breath.    Cardiovascular:  Negative for chest pain.   Gastrointestinal:  Negative for constipation.   Neurological: Negative.    Hematological: Negative.    Psychiatric/Behavioral: Negative.           Objective:   Physical Exam  Vitals reviewed.   Constitutional:       General: He is not in acute distress.     Appearance: He is well-developed.   HENT:      Head: Normocephalic and atraumatic.   Eyes:      Conjunctiva/sclera: Conjunctivae normal.   Cardiovascular:      Rate and Rhythm: Normal rate and regular rhythm.      Heart sounds: Normal heart sounds.   Pulmonary:      Effort: Pulmonary effort is normal.      Breath sounds: Normal breath sounds.   Musculoskeletal:      Cervical back: Neck supple.   Skin:     General: Skin is warm and dry.   Neurological:      Mental Status: He is alert.         /60 (BP Location: Right arm, Patient Position: Sitting, Cuff Size: adult)   Pulse 70   Temp 97.6 °F (36.4 °C) (Temporal)   Ht 5' 5.7\" (1.669 m)   Wt 206 lb 3.2 oz (93.5 kg)   SpO2 98%   BMI 33.59 kg/m²  Estimated body mass index is 33.59 kg/m² as calculated from the following:    Height as of this encounter: 5' 5.7\" (1.669 m).    Weight as of this encounter: 206 lb 3.2 oz (93.5 kg).    Medicare  Hearing Assessment:   Hearing Screening    Time taken: 12/11/2024  7:57 AM  Entry User: Didi Brennan MA  Screening Method: Finger Rub  Finger Rub Result: Fail               Assessment & Plan:   Nasir Beauchamp is a 74 year old male who presents for a Medicare Assessment.     1. Wellness examination (Primary)   -colonoscopy scheduled for 12/13/24  -received flu shot. Up to date with pna vaccine  -due for shingrix   2. Urinary incontinence, unspecified type -check Ua; f/u urology for further eval   -     Urology Referral - In Network  -     Urinalysis with Culture Reflex; Future; Expected date: 12/11/2024  3.Type 2 diabetes mellitus without complication without long term use of insulin (HCC) -diabetes at goal; cont metformin   -     POC Hemoglobin A1C = 6.0  4. Bilateral carotid artery stenosis -cont statin; per cardiology  5. PVD (peripheral vascular disease) (HCC) -cont statin  6. Meniere's disease, unspecified laterality -he notes ongoing tinnitus that is bothersome. Per ENT  7. Presence of cardiac pacemaker  8. Simple chronic bronchitis (HCC) - he self-discontinued trelegy about a month ago and notes his breathing is doing well. Managed by pulm     The patient indicates understanding of these issues and agrees to the plan.  Reinforced healthy diet, lifestyle, and exercise.      Return in about 6 months (around 6/11/2025) for diabetes check.     Staci Miguel MD, 12/11/2024     Supplementary Documentation:   General Health:  In the past six months, have you lost more than 10 pounds without trying?: (Patient-Rptd) 2 - No  Has your appetite been poor?: (Patient-Rptd) No  Type of Diet: (Patient-Rptd) Balanced  How does the patient maintain a good energy level?: (Patient-Rptd) Daily Walks  How would you describe your daily physical activity?: (Patient-Rptd) Light  How would you describe your current health state?: (Patient-Rptd) Good  How do you maintain positive mental well-being?: (Patient-Rptd) Social  Interaction;Puzzles;Games;Visiting Friends;Visiting Family  On a scale of 0 to 10, with 0 being no pain and 10 being severe pain, what is your pain level?: (Patient-Rptd) 2 - (Mild)  In the past six months, have you experienced urine leakage?: (Patient-Rptd) 1-Yes  At any time do you feel concerned for the safety/well-being of yourself and/or your children, in your home or elsewhere?: (Patient-Rptd) No  Have you had any immunizations at another office such as Influenza, Hepatitis B, Tetanus, or Pneumococcal?: (Patient-Rptd) No    Health Maintenance   Topic Date Due    Zoster Vaccines (1 of 2) Never done    Diabetes Care Dilated Eye Exam  03/21/2024    COVID-19 Vaccine (3 - 2024-25 season) 09/01/2024    Colorectal Cancer Screening  10/08/2024    Annual Physical  12/01/2024    Diabetes Care Foot Exam  12/01/2024    Diabetes Care A1C  03/10/2025    Diabetes Care: Microalb/Creat Ratio  06/10/2025    Lung Cancer Screening  10/04/2025    Diabetes Care: GFR  12/09/2025    Influenza Vaccine  Completed    Annual Depression Screening  Completed    Fall Risk Screening (Annual)  Completed    Pneumococcal Vaccine: 65+ Years  Completed

## 2024-12-13 ENCOUNTER — PATIENT MESSAGE (OUTPATIENT)
Dept: INTERNAL MEDICINE CLINIC | Facility: CLINIC | Age: 74
End: 2024-12-13

## 2024-12-13 ENCOUNTER — ANESTHESIA (OUTPATIENT)
Dept: ENDOSCOPY | Facility: HOSPITAL | Age: 74
End: 2024-12-13
Payer: MEDICARE

## 2024-12-13 ENCOUNTER — ANESTHESIA EVENT (OUTPATIENT)
Dept: ENDOSCOPY | Facility: HOSPITAL | Age: 74
End: 2024-12-13
Payer: MEDICARE

## 2024-12-13 ENCOUNTER — HOSPITAL ENCOUNTER (OUTPATIENT)
Facility: HOSPITAL | Age: 74
Setting detail: HOSPITAL OUTPATIENT SURGERY
Discharge: HOME | End: 2024-12-13
Attending: INTERNAL MEDICINE | Admitting: INTERNAL MEDICINE
Payer: MEDICARE

## 2024-12-13 VITALS
SYSTOLIC BLOOD PRESSURE: 114 MMHG | TEMPERATURE: 97 F | BODY MASS INDEX: 33.74 KG/M2 | HEIGHT: 67 IN | RESPIRATION RATE: 18 BRPM | HEART RATE: 65 BPM | DIASTOLIC BLOOD PRESSURE: 98 MMHG | OXYGEN SATURATION: 97 % | WEIGHT: 215 LBS

## 2024-12-13 DIAGNOSIS — Z86.0100 HISTORY OF COLONIC POLYPS: ICD-10-CM

## 2024-12-13 DIAGNOSIS — Z86.0100 PERSONAL HISTORY OF COLONIC POLYPS: ICD-10-CM

## 2024-12-13 LAB — GLUCOSE BLD-MCNC: 101 MG/DL (ref 70–99)

## 2024-12-13 PROCEDURE — 0DBH8ZX EXCISION OF CECUM, VIA NATURAL OR ARTIFICIAL OPENING ENDOSCOPIC, DIAGNOSTIC: ICD-10-PCS | Performed by: INTERNAL MEDICINE

## 2024-12-13 PROCEDURE — 82962 GLUCOSE BLOOD TEST: CPT

## 2024-12-13 PROCEDURE — 0DBK8ZX EXCISION OF ASCENDING COLON, VIA NATURAL OR ARTIFICIAL OPENING ENDOSCOPIC, DIAGNOSTIC: ICD-10-PCS | Performed by: INTERNAL MEDICINE

## 2024-12-13 PROCEDURE — 88305 TISSUE EXAM BY PATHOLOGIST: CPT | Performed by: INTERNAL MEDICINE

## 2024-12-13 PROCEDURE — 0DBL8ZX EXCISION OF TRANSVERSE COLON, VIA NATURAL OR ARTIFICIAL OPENING ENDOSCOPIC, DIAGNOSTIC: ICD-10-PCS | Performed by: INTERNAL MEDICINE

## 2024-12-13 RX ORDER — NICOTINE POLACRILEX 4 MG
15 LOZENGE BUCCAL
Status: DISCONTINUED | OUTPATIENT
Start: 2024-12-13 | End: 2024-12-13

## 2024-12-13 RX ORDER — DEXTROSE MONOHYDRATE 25 G/50ML
50 INJECTION, SOLUTION INTRAVENOUS
Status: DISCONTINUED | OUTPATIENT
Start: 2024-12-13 | End: 2024-12-13

## 2024-12-13 RX ORDER — NALOXONE HYDROCHLORIDE 0.4 MG/ML
80 INJECTION, SOLUTION INTRAMUSCULAR; INTRAVENOUS; SUBCUTANEOUS AS NEEDED
Status: DISCONTINUED | OUTPATIENT
Start: 2024-12-13 | End: 2024-12-13

## 2024-12-13 RX ORDER — LIDOCAINE HYDROCHLORIDE 10 MG/ML
INJECTION, SOLUTION EPIDURAL; INFILTRATION; INTRACAUDAL; PERINEURAL AS NEEDED
Status: DISCONTINUED | OUTPATIENT
Start: 2024-12-13 | End: 2024-12-13 | Stop reason: SURG

## 2024-12-13 RX ORDER — SODIUM CHLORIDE, SODIUM LACTATE, POTASSIUM CHLORIDE, CALCIUM CHLORIDE 600; 310; 30; 20 MG/100ML; MG/100ML; MG/100ML; MG/100ML
INJECTION, SOLUTION INTRAVENOUS CONTINUOUS
Status: DISCONTINUED | OUTPATIENT
Start: 2024-12-13 | End: 2024-12-13

## 2024-12-13 RX ORDER — NICOTINE POLACRILEX 4 MG
30 LOZENGE BUCCAL
Status: DISCONTINUED | OUTPATIENT
Start: 2024-12-13 | End: 2024-12-13

## 2024-12-13 RX ADMIN — LIDOCAINE HYDROCHLORIDE 50 MG: 10 INJECTION, SOLUTION EPIDURAL; INFILTRATION; INTRACAUDAL; PERINEURAL at 07:36:00

## 2024-12-13 RX ADMIN — SODIUM CHLORIDE, SODIUM LACTATE, POTASSIUM CHLORIDE, CALCIUM CHLORIDE: 600; 310; 30; 20 INJECTION, SOLUTION INTRAVENOUS at 08:04:00

## 2024-12-13 RX ADMIN — SODIUM CHLORIDE, SODIUM LACTATE, POTASSIUM CHLORIDE, CALCIUM CHLORIDE: 600; 310; 30; 20 INJECTION, SOLUTION INTRAVENOUS at 07:36:00

## 2024-12-13 NOTE — DISCHARGE INSTRUCTIONS
Home Care Instructions for Colonoscopy  with Sedation    Diet:  - Resume your regular diet as tolerated unless otherwise instructed.  - Start with light meals to minimize bloating.  - Do not drink alcohol today.    Medication:  - If you have questions about resuming your normal medications, please contact your Primary Care Physician.    Activities:  - Take it easy today. Do not return to work today.  - Do not drive today.  - Do not operate any machinery today (including kitchen equipment).    Colonoscopy:  - You may notice some rectal \"spotting\" (a little blood on the toilet tissue) for a day or two after the exam. This is normal.  - If you experience any rectal bleeding (not spotting), persistent tenderness or sharp severe abdominal pains, oral temperature over 100 degrees Fahrenheit, light-headedness or dizziness, or any other problems, contact your doctor.      **If unable to reach your doctor, please go to the [facility] Emergency Room**    - Your referring physician will receive a full report of your examination.  - If you do not hear from your doctor's office within two weeks of your biopsy, please call them for your results.    You may be able to see your laboratory results in [employer] between 4 and 7 business days.  In some cases, your physician may not have viewed the results before they are released to [employer].  If you have questions regarding your results contact the physician who ordered the test/exam by phone or via [employer] by choosing \"Ask a Medical Question.\"

## 2024-12-13 NOTE — ANESTHESIA POSTPROCEDURE EVALUATION
[facility]    Nasir Beauchamp Patient Status:  Hospital Outpatient Surgery   Age/Gender 74 year old male MRN [id number]   Location [facility] END[facility]COPY [facility] Attending Stewart Tolentino MD   Hosp Day # 0 PCP Staci Miguel MD       Anesthesia Post-op Note    COLONOSCOPY with cold snare polypectomy    Procedure Summary       Date: 12/13/24 Room / Location:  ENDOSCOPY 04 /  ENDOSCOPY    Anesthesia Start: 0732 Anesthesia Stop: 0813    Procedure: COLONOSCOPY with cold snare polypectomy Diagnosis:       Personal history of colonic polyps      (Polyps, hemorrhoids)    Surgeons: Stewart Tolentino MD Anesthesiologist: Raghu Santo MD    Anesthesia Type: MAC ASA Status: 3            Anesthesia Type: MAC    Vitals Value Taken Time   /65 12/13/24 0818   Temp   12/13/24 0822   Pulse 63 12/13/24 0821   Resp 18 12/13/24 0809   SpO2 96 % 12/13/24 0821   Vitals shown include unfiled device data.    Patient Location: Endoscopy    Anesthesia Type: MAC    Airway Patency: patent    Postop Pain Control: adequate    Mental Status: preanesthetic baseline    Nausea/Vomiting: none    Cardiopulmonary/Hydration status: stable euvolemic    Complications: no apparent anesthesia related complications    Postop vital signs: stable    Dental Exam: Unchanged from Preop    Patient to be discharged from PACU when criteria met.

## 2024-12-13 NOTE — H&P
[facility]  History & Physical    Nasir Beauchamp Patient Status:  Hospital Outpatient Surgery    4/15/1950 MRN [id number]   Location [facility] ENDOSCOPY PAIN CENTER Attending Stewart Tolentino MD   Hosp Day # 0 PCP Staci Miguel MD     History of Present Illness:  Nasir Beauchamp is a(n) 74 year old male with a history of adenomatous colon polyps from 10/8/2021, presenting for surveillance colonoscopy.    History:  Past Medical History:    Abdominal hernia    ALCOHOL USE    1-2 times/wk    Arthritis    Back pain    Bronchitis    Chronic left-sided low back pain    Diabetes (HCC)    diet controlled - dx 2020    Easy bruising    Elevated lipase    Flatulence/gas pain/belching    Gout    Hernia, abdominal    1973 hernia repair.  double hernia repair    Left knee pain    Obesity    Pacemaker    Pneumonia due to organism    Pulmonary emphysema (HCC)    Wears glasses     Past Surgical History:   Procedure Laterality Date    Cardiac pacemaker placement  2018    Cataract      Colonoscopy  2014    Colonoscopy N/A 10/08/2021    Procedure: COLONOSCOPY with cold snare polypectomy;  Surgeon: Stewart Tolentino MD;  Location:  ENDOSCOPY    Dental surgery procedure  2009    dental implants    Hernia surgery Right 1973    Hernia surgery Bilateral     Lasik Bilateral     Other surgical history      Dental Implants 6 years ago    Pacemaker/defibrillator      2017    Repair ing hernia,5+y/o,reducibl      Repair of nasal septum N/A     Tonsillectomy  1950's    Vasectomy       Family History   Problem Relation Age of Onset    Other (Other) Father         dementia    Other (Other) Mother         COPD    Asthma Mother     Pulmonary Disease Mother       reports that he quit smoking about 7 years ago. His smoking use included cigarettes. He started smoking about 59 years ago. He has a 52.8 pack-year smoking history. He has been exposed to tobacco smoke. He has never used smokeless tobacco. He reports  current alcohol use of about 1.0 standard drink of alcohol per week. He reports that he does not use drugs.  Allergies[1]     glucose (Dex4) 15 GM/59ML oral liquid 15 g  15 g Oral Q15 Min PRN    Or    glucose (Glutose) 40% oral gel 15 g  15 g Oral Q15 Min PRN    Or    glucose-vitamin C (Dex-4) chewable tab 4 tablet  4 tablet Oral Q15 Min PRN    Or    dextrose 50% injection 50 mL  50 mL Intravenous Q15 Min PRN    Or    glucose (Dex4) 15 GM/59ML oral liquid 30 g  30 g Oral Q15 Min PRN    Or    glucose (Glutose) 40% oral gel 30 g  30 g Oral Q15 Min PRN    Or    glucose-vitamin C (Dex-4) chewable tab 8 tablet  8 tablet Oral Q15 Min PRN    lactated ringers infusion   Intravenous Continuous     No current outpatient medications on file.    Review of Systems:  Gastrointestinal: Denies positive test for blood stool, heartburn/indigestion/reflux, belching, difficulty swallowing, irregular bowel habits, painful swallowing, diarrhea, abdominal pain, constipation, nausea, incontinence of stool, vomiting, black stools, get full quickly at meals, blood in stools, abdominal distention, jaundice, flatulence, vomiting blood, bloating, hernia, laxative use, food/milk intolerance, pain with bowel movement, hemorrhoids.  General: Denies fatigue, chills/fever, night sweats, weight loss, loss of appetite, weight gain, sleep disturbance.  Neurological: Denies frequent headaches, history of stroke, recent passing out, recent dizziness, convulsions/seizures, dementia.  Cardiovascular: Denies history of heart murmur, leg swelling, history of rheumatic fever, pacemaker, chest pain or pressure after eating or when upset, automatic defibrillator, angina, other implanted devices, irregular heart rate/palpitations, high cholesterol or triglycerides, coronary stents.  Respiratory: Denies shortness of breath, chronic/frequent hoarseness, wheezing, exposure to tuberculosis, chronic cough, spitting up blood, cough up sputum, sleep  apnea.  Genitourinary: Denies kidney stones, painful/difficult urination, frequent urinary infections, frequent urination, blood in urine, incontinence, kidney failure, prostate problems.  Endocrine: Denies thyroid disease, denies diabetes.  Female complaints: Denies endometriosis, painful menstrual periods, heavy menstrual periods.  Patient is not pregnant.  Psychosocial: Denies history of mental illness, denies usually feeling lonely or depressed, denies history of depression, anxiety, history of physical or sexual abuse, stress, history of eating disorder.  Skin: Denies severe itching, unusual moles, rash, flushing, change in hair or nails.  Bone/joint: Denies arthritis, back pain, joint pain, osteoporosis.  Heme/Lymphatic: Denies easy bruising, anemia, excessive bleeding, enlarging or painful lymph nodes.  Allergy: Denies medication allergy, latex/rubber allergy, anaphylactic or other reaction to anesthesia, food allergy.   Eyes: Denies blurred/double vision, eye disease, glasses or contacts, glaucoma.  ENT: Denies nose or gums bleeding, mouth sores, bad breath or bad taste in mouth, hearing loss.    Physical Exam:   General: alert, cooperative, oriented.  No respiratory distress.   Head: Normocephalic, without obvious abnormality, atraumatic.   Eyes: Conjunctivae/corneas clear.  No scleral icterus.  No conjunctival     hemorrhage.   Nose: Nares normal.   Throat: Lips, mucosa, and tongue normal.  No thrush noted.   Neck: Soft, supple neck; trachea midline, no adenopathy, no thyromegaly.   Lungs: CTA B   Chest wall: No tenderness or deformity.   Heart: Regular rate and rhythm, normal S1S2, no murmur.   Abdomen: soft, non-tender, non-distended, no masses, no guarding, no     rebound, positive BS.   Extremity: no edema, no cyanosis   Skin: No rashes or lesions.    Neurological: Alert, interactive, no focal deficits    ASA Score: 4     Plan: Colonoscopy  Risk/Benefits:  The risks and benefits of the procedure were  discussed in detail with the patient, including the risk of bleeding, infection, pain, sedation, and perforation.  The patient was also informed that polyps and tumors can be missed on rare occasions, which may require future procedures. The patient indicates understanding of these issues and agrees to proceed with the scheduled procedure.   Stewart Tolentino MD  12/13/2024  6:55 AM               [1] No Known Allergies

## 2024-12-13 NOTE — ANESTHESIA PREPROCEDURE EVALUATION
PRE-OP EVALUATION    Patient Name: Nasir Beauchamp    Admit Diagnosis: Personal history of colonic polyps [Z86.0100]    Pre-op Diagnosis: Personal history of colonic polyps [Z86.0100]    COLONOSCOPY    Anesthesia Procedure: COLONOSCOPY    Surgeons and Role:     * Stewart Tolentino MD - Primary    Pre-op vitals reviewed.  Temp: 97.2 °F (36.2 °C)  Pulse: 82  Resp: 16  BP: 151/70  SpO2: 98 %  Body mass index is 33.67 kg/m².    Current medications reviewed.  Hospital Medications:   glucose (Dex4) 15 GM/59ML oral liquid 15 g  15 g Oral Q15 Min PRN    Or    glucose (Glutose) 40% oral gel 15 g  15 g Oral Q15 Min PRN    Or    glucose-vitamin C (Dex-4) chewable tab 4 tablet  4 tablet Oral Q15 Min PRN    Or    dextrose 50% injection 50 mL  50 mL Intravenous Q15 Min PRN    Or    glucose (Dex4) 15 GM/59ML oral liquid 30 g  30 g Oral Q15 Min PRN    Or    glucose (Glutose) 40% oral gel 30 g  30 g Oral Q15 Min PRN    Or    glucose-vitamin C (Dex-4) chewable tab 8 tablet  8 tablet Oral Q15 Min PRN    lactated ringers infusion   Intravenous Continuous       Outpatient Medications:   Prescriptions Prior to Admission[1]    Allergies: Patient has no known allergies.      Anesthesia Evaluation    Patient summary reviewed.    Anesthetic Complications  (-) history of anesthetic complications         GI/Hepatic/Renal      (-) GERD                           Cardiovascular        Exercise tolerance: good     MET: >4    (-) obesity  (-) hypertension     (-) CAD      (+) pacemaker/AICD            (-) angina     (-) HERNANDEZ         Endo/Other      (+) diabetes and well controlled, type 2,                          Pulmonary      (-) asthma  (+) COPD       (+) shortness of breath     (-) sleep apnea       Neuro/Psych                              Patient Active Problem List:     Rotator cuff syndrome of left shoulder     History of smoking 25-50 pack years     Anemia     Vertigo     Carotid disease, bilateral (HCC)     Meniere's disease     Presence of  cardiac pacemaker     PVD (peripheral vascular disease) (HCC)     RBBB (right bundle branch block with left anterior fascicular block)     Diet-controlled type 2 diabetes mellitus (HCC)     Pacemaker     Left knee pain     Easy bruising     Chronic left-sided low back pain     Arthritis     Abdominal hernia     Right inguinal hernia     Dyspnea on exertion     Chest pain, atypical            Past Surgical History:   Procedure Laterality Date    Cardiac pacemaker placement  2018    Cataract      Colonoscopy      Colonoscopy N/A 10/08/2021    Procedure: COLONOSCOPY with cold snare polypectomy;  Surgeon: Stewart Tolentino MD;  Location:  ENDOSCOPY    Dental surgery procedure  2009    dental implants    Hernia surgery Right 1973    Hernia surgery Bilateral     Lasik Bilateral     Other surgical history      Dental Implants 6 years ago    Pacemaker/defibrillator      2017    Repair ing hernia,5+y/o,reducibl      Repair of nasal septum N/A     Tonsillectomy  1950's    Vasectomy       Social History     Socioeconomic History    Marital status:    Tobacco Use    Smoking status: Former     Current packs/day: 0.00     Average packs/day: 1 pack/day for 52.8 years (52.8 ttl pk-yrs)     Types: Cigarettes     Start date: 1965     Quit date: 10/30/2017     Years since quittin.1     Passive exposure: Past (pt's parents both smoked in the home)    Smokeless tobacco: Never    Tobacco comments:     Updated 24   Vaping Use    Vaping status: Never Used   Substance and Sexual Activity    Alcohol use: Yes     Alcohol/week: 1.0 standard drink of alcohol     Types: 1 Standard drinks or equivalent per week     Comment: 1 drink weekly    Drug use: Never   Other Topics Concern    Caffeine Concern No    Exercise No    Seat Belt No    Special Diet No    Stress Concern No    Weight Concern No     History   Drug Use Unknown     Available pre-op labs reviewed.  Lab Results   Component Value Date    WBC 8.0  12/09/2024    RBC 4.70 12/09/2024    HGB 12.8 (L) 12/09/2024    HCT 39.8 12/09/2024    MCV 84.7 12/09/2024    MCH 27.2 12/09/2024    MCHC 32.2 12/09/2024    RDW 18.1 12/09/2024    .0 (L) 12/09/2024     Lab Results   Component Value Date     12/09/2024    K 4.9 12/09/2024     12/09/2024    CO2 29.0 12/09/2024    BUN 18 12/09/2024    CREATSERUM 1.16 12/09/2024    GLU 95 12/09/2024    CA 9.4 12/09/2024            Airway      Mallampati: II  Mouth opening: >3 FB  TM distance: 4 - 6 cm  Neck ROM: full Cardiovascular    Cardiovascular exam normal.  Rhythm: regular  Rate: normal     Dental  Comment: Patient denies any loose/missing/cracked teeth. No gross abnormalities or loose teeth noted on exam.      Dentition appears grossly intact         Pulmonary    Pulmonary exam normal.                 Other findings              ASA: 3   Plan: MAC  NPO status verified and patient meets guidelines.    Post-procedure pain management plan discussed with surgeon and patient.    Comment:     A detailed discussion about the anesthetic plan was held with Nasir Beauchamp in the preoperative area. Benefits and risks of MAC anesthesia were discussed, including intraoperative awareness/recall, PONV, reasonable expectations of post-operative pain/discomfort, aspiration, conversion to general anesthesia, dental injury, pressure/nerve injuries from positioning, and other serious but rare complications (life-threatening cardiopulmonary events). All questions were answered appropriately and patient demonstrated understanding of realistic expectations and risks of undergoing anesthesia. Nasir Beauchamp consents to receiving anesthesia and wishes to proceed.  Plan/risks discussed with: patient                Present on Admission:  **None**             [1]   Facility-Administered Medications Prior to Admission   Medication Dose Route Frequency Provider Last Rate Last Admin    [COMPLETED] triamcinolone acetonide (Kenalog-40) 40  MG/ML injection 40 mg  40 mg Intra-articular Once Julio Benton MD   40 mg at 11/15/24 1210    [COMPLETED] ketorolac (Toradol) 30 MG/ML injection 30 mg  30 mg Intramuscular Once Julio Benton MD   30 mg at 11/15/24 1210    [COMPLETED] triamcinolone acetonide (Kenalog-40) 40 MG/ML injection 40 mg  40 mg Intra-articular Once Fili Hines MD   40 mg at 11/11/24 1147     Medications Prior to Admission   Medication Sig Dispense Refill Last Dose/Taking    magnesium oxide 400 MG Oral Tab Take 1 tablet (400 mg total) by mouth daily.   12/11/2024    aspirin 81 MG Oral Tab EC Take 1 tablet (81 mg total) by mouth daily. 90 tablet 3 12/11/2024    famotidine 20 MG Oral Tab Take 1 tablet (20 mg total) by mouth 2 (two) times daily as needed for Heartburn. (Patient not taking: Reported on 12/11/2024) 30 tablet 0 Taking As Needed    brimonidine 0.2 % Ophthalmic Solution Place 1 drop into the right eye Q12H.   12/12/2024    rosuvastatin 10 MG Oral Tab TAKE 1 TABLET(10 MG) BY MOUTH EVERY NIGHT 90 tablet 1 12/11/2024    Misc Natural Products (GLUCOSAMINE CHONDROITIN ADV) Oral Tab Take 1 tablet by mouth daily.   Taking    latanoprost 0.005 % Ophthalmic Solution Place 1 drop into both eyes nightly.   Taking    Multiple Vitamins-Minerals (PRESERVISION AREDS 2) Oral Cap take two tablets by mouth daily   Taking    metFORMIN 500 MG Oral Tab TAKE 1 TABLET(500 MG) BY MOUTH DAILY WITH BREAKFAST 90 tablet 3 12/11/2024    fluticasone-umeclidin-vilant (TRELEGY ELLIPTA) 100-62.5-25 MCG/ACT Inhalation Aerosol Powder, Breath Activated INHALE 1 PUFF INTO THE LUNGS DAILY (Patient not taking: Reported on 12/11/2024) 180 each 3

## 2024-12-13 NOTE — OPERATIVE REPORT
Colon operative report  Patient Name: Nasir Beauchamp  Procedure: Colonoscopy with snare polypectomy  Date of procedure: 12/13/2024    Pre-operative Indication: Personal history of adenomatous colon polyps  Post-operative findings: Colon polyps, Hemorrhoids  Date of last colonoscopy: 10/8/2021  Attending: Stewart Tolentino M.D.  Consent: The risks, benefits, and alternatives were discussed with the patient / POA.  Risks included, but were not limited to, bleeding, perforation, medication effects, cardiac arrhythmias, missed polyps, and aspiration.  After all questions were answered to their satisfaction, a signed, informed, and witnessed consent was obtained.  Timeout:  Prior to initiation of sedation, a formal timeout was performed, confirming the patient's name, date of birth, allergies, correct procedure, and need for antibiotics.  The operating physician and sedating physician was also confirmed prior to initiation of sedation.     Sedation: Monitored Anesthesia Care  Monitoring: Pulsoximetry, pulse, respirations, and blood pressure were monitored throughout the entire procedure    Preparation Quality: Adequate           [place] Prep Score:  Right: 2, Middle: 2, Left: 2    Total: 6  Procedure: After achieving adequate sedation, and placing the patient in the left lateral decubitus position, a digital rectal examination was performed.  The lubricated tip of the pediatric colonoscope was then introduced into the rectum and advanced to the terminal ileum.  The appendiceal orifice and ileocecal valve were clearly and distinctly visualized, thus verifying the cecum.  The terminal ileum was intubated and found to be normal to the extent examined.  The endoscope was then carefully withdrawn from the patient with careful visualization of the colonic mucosa revealing no additional pathologic findings.  Air was suctioned to the best of my ability, during withdrawal of the endoscope.  When the endoscope reached the rectum,  it was placed in a retroflexed position, and the rectal bulb was thus visualized.  The endoscope was righted, and air was suctioned from the colon to the best of my ability, as it was during withdrawn from the colon.  The endoscope was then removed from the patient.  The patient tolerated the procedure without apparent procedural complications.  The patient left the procedure room in stable condition for recovery.  Findings:  There were grade II internal hemorrhoids.  There were no masses, fissures, fistulae, or external hemorrhoids.  The mucosa of the colon  was normal, from the rectum to the cecum.  There were no masses, ulcers, erosions, or diverticula.  In the cecum, a 5 mm sessile polyp (Nice II) was resected by cold snare, using the small hexagonal snare.  In the mid-ascending colon, a 8 mm and adjacent 5 mm sessile polyps (Nice II) were resected by cold snare, using the small hexagonal snare.  In the distal ascending colon, a 3 mm sessile polyp (Nice II) was resected by cold snare, using the small hexagonal snare.  In the proximal transverse colon, at the hepatic flexure, 3 sessile polyps (3-5 mm; Nice II) were resected by cold snare, using the small hexagonal snare. The appendiceal orifice and ileocecal valve were both clearly and distinctly visualized, thus verifying the cecum.  The terminal ileum was intubated and the terminal ileal mucosa was found to be normal to the extent examined.   Impression: Findings as above.    Recommendations: Follow-up pathology  Repeat Colonoscopy Indicated: 3 years

## 2024-12-15 PROBLEM — E11.9 TYPE 2 DIABETES MELLITUS WITHOUT COMPLICATION, WITHOUT LONG-TERM CURRENT USE OF INSULIN (HCC): Status: ACTIVE | Noted: 2020-02-13

## 2024-12-16 ENCOUNTER — APPOINTMENT (OUTPATIENT)
Dept: PHYSICAL THERAPY | Age: 74
End: 2024-12-16
Attending: ORTHOPAEDIC SURGERY
Payer: MEDICARE

## 2024-12-16 NOTE — TELEPHONE ENCOUNTER
Dr. Miguel: Would you be ok to fill patient's handicap placard or have patient contact Dr. Hines?   Physical Therapy  Facility/Department: [facility]  PROGRESSIVE CARE  Daily Treatment Note  NAME: Maxx Negrete  : 3/21/1932  MRN: [id number]    Date of Service: 2021    Discharge Recommendations:  Continue to assess pending progress, ECF with PT        Assessment   Body structures, Functions, Activity limitations: Decreased functional mobility ; Decreased safe awareness;Decreased balance;Decreased cognition;Decreased ADL status; Decreased ROM; Decreased endurance; Increased pain;Decreased strength;Decreased posture  Prognosis: Fair  Decision Making: Low Complexity  REQUIRES PT FOLLOW UP: Yes  Activity Tolerance  Activity Tolerance: Patient limited by pain; Patient limited by cognitive status     Patient Diagnosis(es): The encounter diagnosis was Closed right hip fracture, initial encounter ([facility] [address] has a past medical history of Adenomatous polyp, Cataracts, bilateral, Combined forms of age-related cataract of both eyes, Corneal scar, right eye, Cystocele, Dementia ([address] Difficulty controlling anger, Dyslipidemia, GERD (gastroesophageal reflux disease), Goiter, Hiatal hernia, Hypertension, Hypokalemia, Hypothyroidism, Impaired fasting glucose, Malignant melanoma in situ ([address] Migraines, Murmur, functional, Osteopenia, Posterior vitreous detachment of both eyes, Primary open angle glaucoma (POAG) of both eyes, moderate stage, Sciatica, Skin cancer, Syncope and collapse, Tremor, Trichiasis of left lower eyelid without entropion, and Visual disturbance. has a past surgical history that includes other surgical history (08); Cholecystectomy; Colonoscopy (06/08/10); Mohs surgery (10/07/09); Vein Surgery (09); Colonoscopy (02); Cystocele repair (99); Appendectomy; Dilation and curettage of uterus; other surgical history (); Hysterectomy (); Upper gastrointestinal endoscopy (2015); Intracapsular cataract extraction (Left, 2020);  Intracapsular cataract extraction (Right, 10/8/2020); and Hip fracture surgery (Right, 11/2/2021). Restrictions  Restrictions/Precautions  Restrictions/Precautions: Weight Bearing  Lower Extremity Weight Bearing Restrictions  Right Lower Extremity Weight Bearing: Weight Bearing As Tolerated  Subjective   General  Chart Reviewed: Yes  Response To Previous Treatment: Patient with no complaints from previous session. Family / Caregiver Present: No  Subjective  Subjective: Patient supine in bed upon arrival and willing to participate in therapy. Pain Screening  Patient Currently in Pain: Yes  Pain Assessment  Pain Assessment: Faces  Pain Level: 6  Pain Type: Surgical pain  Pain Location: Hip  Pain Orientation: Right  Vital Signs  Patient Currently in Pain: Yes       Orientation  Orientation  Overall Orientation Status: Impaired  Orientation Level: Oriented to person  Cognition      Objective   Bed mobility  Supine to Sit: Moderate assistance  Sit to Supine: Moderate assistance  Scooting: Moderate assistance  Transfers  Sit to Stand: Maximum Assistance  Stand to sit: Maximum Assistance  Stand Pivot Transfers:  (Poor weight bearing through LE's this date.)  Ambulation  Ambulation?: No  Neuromuscular Education  NDT Treatment: Sitting;Standing  Balance  Posture: Fair  Sitting - Static: Fair  Sitting - Dynamic: Fair  Standing - Static: Poor  Standing - Dynamic: Poor  Exercises  Quad Sets: 10x  Knee Long Arc Quad: 10x AROM , AAROM  Ankle Pumps: 10x supine and sitting  Comments: Hamstring curls with resistance.  x10                        G-Code     OutComes Score                                                     AM-PAC Score             Goals  Short term goals  Time Frame for Short term goals: LOS  Short term goal 1: Bed mobility with mod assist x 1  Short term goal 2: Transfers with max assist x 1  Short term goal 3: Sitting at EOB x 5 min with mod assist x 1  Patient Goals   Patient goals : none stated    Plan    Plan  Times per day: Twice a day  Current Treatment Recommendations: Strengthening, Transfer Training, Endurance Training, Neuromuscular Re-education, Patient/Caregiver Education & Training, Equipment Evaluation, Education, & procurement, ROM, Balance Training, Gait Training, Home Exercise Program, Safety Education & Training, Functional Mobility Training  Safety Devices  Type of devices: Patient at risk for falls, All fall risk precautions in place, Call light within reach, Gait belt, Nurse notified, Left in chair, Telesitter in use, Chair alarm in place     Therapy Time   Individual Concurrent Group Co-treatment   Time In 0722         Time Out 0741         Minutes [address] [facility]

## 2024-12-17 ENCOUNTER — OFFICE VISIT (OUTPATIENT)
Age: 74
End: 2024-12-17
Payer: MEDICARE

## 2024-12-17 VITALS
SYSTOLIC BLOOD PRESSURE: 130 MMHG | RESPIRATION RATE: 16 BRPM | WEIGHT: 209 LBS | BODY MASS INDEX: 33.99 KG/M2 | DIASTOLIC BLOOD PRESSURE: 70 MMHG | OXYGEN SATURATION: 99 % | HEART RATE: 73 BPM | HEIGHT: 65.7 IN

## 2024-12-17 DIAGNOSIS — R05.3 CHRONIC COUGH: Primary | ICD-10-CM

## 2024-12-17 DIAGNOSIS — Z72.0 TOBACCO ABUSE: ICD-10-CM

## 2024-12-17 PROCEDURE — 99213 OFFICE O/P EST LOW 20 MIN: CPT

## 2024-12-17 NOTE — PATIENT INSTRUCTIONS
Call office with any questions or concerns  Call office if develop any new or worsening respiratory symptoms.   Can trial flonase   Can Start using nasal rinses (neilmed rinse, netipot or similar) with distilled water at least once a day but can use twice a day if needed. After using the nasal rinse, then use a nasal steroid such as flonase, nasocort, nasonex or similar medication. You can get generic nasal steroids.  Use this for at least two weeks to assess for any improvement.   Monitor breathing off inhalers  Call central scheduling at [phone] to schedule the CT scan in 1 year. If you get ill (sinus infection, cold, respiratory illness or other illness) you should postpone the scan for at least 4-6 weeks after you have recovered. Please call with any questions.

## 2024-12-17 NOTE — PROGRESS NOTES
[facility] General Pulmonary Progress Note    History of Present Illness:  Nasir Beauchamp is a 74 year old male who presents today for follow up after stopping inhalers. Overall feels good. Stopped taking Trelegy over a month ago due to having a hoarse voice, even after rinsing mouth out. Feels that his breathing has not changed with stopping the medication. Reports cough, clear to yellow sputum, feels like postnasal drip. Denies acute concerns. Denies dyspnea, chest pain/pressure, wheezing, no fevers, chills, fatigue.      Previously 10/2024 Dr Stokes  Nasir Beauchamp is a 74 year old male former smoker (quit 2017, 50 pack years) with significant PMH of DM, HTN, s/p PPM who presents today for follow up. Since last visit he has been well. He started using trelegy every other day to avoid thrush issues and has felt no cough or dyspnea.      December 2023 previously RAMESH Haywood  Reports having increase SOB and wheezing leading him to use Trelegy again. Felt a little better however unable to continue to use as inhaler open for > 6 weeks. No f/c/ns. Increased clearing his throat and phlegm. Increased wheezing last night; using albuterol with relief.      Previously 10/2023  a 73 year old male, former smoker,  with significant PMH of bronchitis and sinusitis who presents today to review LDCT scan. Feels well. Thrush and hoarseness completely resolved, denies SOB off of inhalers.      Previously 9/27/2023  a 73 year old male who presents for follow - up. States the hoarseness has resolved. States no new issues. Denies cough or SOB.      Previously 9/13/23  a 73 year old male who presents for c/o hoarseness that he has noticed over the past month. On trelegy since May feels like he needs to clear his throat often and the hoarseness. Breathing is significantly better. Rinsing his mouth after each inhaler use. No f/c/ns. No s/sx of GERD. No rhinorhea or PND.     Previous 6/2023  a 73 year old male who presents for follow - up. States  the breathing is much better on the Trelegy. Reports sleeping through the night, no cough, SOB. Using nasal wash, flonase, Trelegy; has not needed albuterol inhlaer. Feels back to normal.     Previously Dr Stokes 5/23/23  a 73 year old male former smoker (quit 2017, 50 pack years) with significant PMH of DM, HTN, s/p PPM  who presents today for evaluation of dyspnea. He reports having been well until around March 2023 after awakening with dyspnea, cough and wheeze. He was seen/managed in ER treated for bronchospasm, possible pneumonia and given prednisone, albuterol and zpak.  He did have some improvement, however his symptoms worsened and he was evaluated again in ER in April x 2 and again in early may 2023.  He now presents today for further evaluation. He admits the albuterol helps but only lasts about 2 hours, then needs to take it again. He has had ongoing wheezing for the past few months. He has had ongoing sinus congestion/drainage leading to frequent cough and throat clearing with clear phlegm. No blood. No fevers. No chest pain/pressure.   Has had bronchitis episodes in the past several years, but never diagnosed with asthma or COPD.   Has noted sneezing when in his office at home, not aware of any new furniture, home decor, no renovations or changes.      Retired, worked as , not aware of any significant exposures.  Past Medical History:   Past Medical History:    Abdominal hernia    ALCOHOL USE    1-2 times/wk    Arthritis    Back pain    Bronchitis    Chronic left-sided low back pain    Diabetes (HCC)    diet controlled - dx 2/2020    Easy bruising    Elevated lipase    Flatulence/gas pain/belching    Gout    Hernia, abdominal    1973 hernia repair. 2001 double hernia repair    Left knee pain    Obesity    Pacemaker    Pneumonia due to organism    Pulmonary emphysema (HCC)    Wears glasses        Past Surgical History:   Past Surgical History:   Procedure Laterality Date    Cardiac pacemaker  placement  2018    Cataract      Colonoscopy  2014    Colonoscopy N/A 10/08/2021    Procedure: COLONOSCOPY with cold snare polypectomy;  Surgeon: Stewart Tolentino MD;  Location:  ENDOSCOPY    Colonoscopy N/A 2024    Procedure: COLONOSCOPY with cold snare polypectomy;  Surgeon: Stewart Tolentino MD;  Location:  ENDOSCOPY    Dental surgery procedure  2009    dental implants    Hernia surgery Right 1973    Hernia surgery Bilateral     Lasik Bilateral     Other surgical history      Dental Implants 6 years ago    Pacemaker/defibrillator      2017    Repair ing hernia,5+y/o,reducibl      Repair of nasal septum N/A     Tonsillectomy  1950's    Vasectomy         Family Medical History:   Family History   Problem Relation Age of Onset    Other (Other) Father         dementia    Other (Other) Mother         COPD    Asthma Mother     Pulmonary Disease Mother         Social History:   Social History     Socioeconomic History    Marital status:      Spouse name: Not on file    Number of children: Not on file    Years of education: Not on file    Highest education level: Not on file   Occupational History    Not on file   Tobacco Use    Smoking status: Former     Current packs/day: 0.00     Average packs/day: 1 pack/day for 52.8 years (52.8 ttl pk-yrs)     Types: Cigarettes     Start date: 1965     Quit date: 10/30/2017     Years since quittin.1     Passive exposure: Past (pt's parents both smoked in the home)    Smokeless tobacco: Never    Tobacco comments:     Updated 24   Vaping Use    Vaping status: Never Used   Substance and Sexual Activity    Alcohol use: Yes     Alcohol/week: 1.0 standard drink of alcohol     Types: 1 Standard drinks or equivalent per week     Comment: 1 drink weekly    Drug use: Never    Sexual activity: Not on file   Other Topics Concern    Caffeine Concern No    Exercise No    Seat Belt No    Special Diet No    Stress Concern No    Weight Concern No    Social History Narrative    Not on file     Social Drivers of Health     Financial Resource Strain: Not on file   Food Insecurity: Unknown (9/10/2024)    Food Insecurity     Food Insecurity: Patient declined   Transportation Needs: No Transportation Needs (9/10/2024)    Transportation Needs     Lack of Transportation: No     Car Seat: Not on file   Physical Activity: Not on file   Stress: Not on file   Social Connections: Not on file   Housing Stability: Low Risk  (9/10/2024)    Housing Stability     Housing Instability: No     Housing Instability Emergency: Not on file     Crib or Bassinette: Not on file        Medications:   Current Outpatient Medications   Medication Sig Dispense Refill    metFORMIN 500 MG Oral Tab TAKE 1 TABLET(500 MG) BY MOUTH DAILY WITH BREAKFAST 90 tablet 3    fluticasone-umeclidin-vilant (TRELEGY ELLIPTA) 100-62.5-25 MCG/ACT Inhalation Aerosol Powder, Breath Activated INHALE 1 PUFF INTO THE LUNGS DAILY (Patient not taking: Reported on 12/17/2024) 180 each 3    magnesium oxide 400 MG Oral Tab Take 1 tablet (400 mg total) by mouth daily.      aspirin 81 MG Oral Tab EC Take 1 tablet (81 mg total) by mouth daily. 90 tablet 3    famotidine 20 MG Oral Tab Take 1 tablet (20 mg total) by mouth 2 (two) times daily as needed for Heartburn. (Patient not taking: Reported on 12/17/2024) 30 tablet 0    brimonidine 0.2 % Ophthalmic Solution Place 1 drop into the right eye Q12H.      rosuvastatin 10 MG Oral Tab TAKE 1 TABLET(10 MG) BY MOUTH EVERY NIGHT 90 tablet 1    Misc Natural Products (GLUCOSAMINE CHONDROITIN ADV) Oral Tab Take 1 tablet by mouth daily.      latanoprost 0.005 % Ophthalmic Solution Place 1 drop into both eyes nightly.      Multiple Vitamins-Minerals (PRESERVISION AREDS 2) Oral Cap take two tablets by mouth daily         Review of Systems: Review of Systems   Constitutional: Negative.  Negative for fatigue and fever.   HENT:  Positive for postnasal drip. Negative for congestion and sore  throat.    Respiratory: Negative.  Negative for cough and shortness of breath.    Cardiovascular: Negative.    Gastrointestinal: Negative.         Physical Exam:  /70 (BP Location: Left arm, Patient Position: Sitting, Cuff Size: adult)   Pulse 73   Resp 16   Ht 5' 5.7\" (1.669 m)   Wt 209 lb (94.8 kg)   SpO2 99%   BMI 34.04 kg/m²      Constitutional: alert, cooperative. No acute distress.  HEENT: Head NC/AT.   Cardio: Regular rate and rhythm. Normal S1 and S2. No murmurs.   Respiratory: Thorax symmetrical with no labored breathing. Clear to ausculation bilaterally with symmetrical breath sounds. No wheezing, rhonchi, rales, or crackles.   Extremities: No clubbing or cyanosis. No BLE edema.    Neurologic: A&Ox3. No gross motor deficits.  Skin: Warm, dry  Psych: Calm, cooperative. Pleasant affect.    Results:  Personally reviewed    WBC: 8, done on 12/9/2024.  HGB: 12.8, done on 12/9/2024.  PLT: 146, done on 12/9/2024.     Glucose: 95, done on 12/9/2024.  Cr: 1.16, done on 12/9/2024.  Last eGFR was 65 on 12/9/2024.  CA: 9.4, done on 12/9/2024.  Na: 140, done on 12/9/2024.  K: 4.9, done on 12/9/2024.  Cl: 106, done on 12/9/2024.  CO2: 29, done on 12/9/2024.  Last ALB was 4.1% done on 12/9/2024.     XR SHOULDER, COMPLETE (MIN 2 VIEWS), LEFT (CPT=73030)    Result Date: 10/25/2024  PROCEDURE:  XR SHOULDER, COMPLETE (MIN 2 VIEWS), LEFT (CPT=73030)  TECHNIQUE:  Multiple views were obtained.  COMPARISON:  None.  INDICATIONS:  M25.512 Left shoulder pain, unspecified chronicity  PATIENT STATED HISTORY: (As transcribed by Technologist)  Ortho consult. Patient states chronic left shoulder pain, no known injury.    FINDINGS: No fracture or dislocation. Left glenohumeral joint space is well maintained.  Minimal hypertrophic changes of the left acromioclavicular joint.  Calcification adjacent to left greater tubercle may be due to calcific tendinopathy. IMPRESSION: No fracture or dislocation.  Mild to moderate  osteoarthritic changes of the left shoulder.   LOCATION:  [facility]   Dictated by (CST): Rolando Cintron MD on 10/25/2024 at 8:22 AM     Finalized by (CST): Rolando Cintron MD on 10/25/2024 at 8:23 AM       XR HIP W OR WO PELVIS 2 OR 3 VIEWS, LEFT (CPT=73502)    Result Date: 10/21/2024  CONCLUSION:  No evidence of acute displaced fracture or dislocation in the left hip.  LOCATION:  [facility]   Dictated by (CST): Wilfredo Wilcox MD on 10/21/2024 at 8:56 AM     Finalized by (CST): Wilfredo Wilcox MD on 10/21/2024 at 8:56 AM       CT LUNG LD SCREENING(CPT=71271)    Result Date: 10/4/2024  CONCLUSION:  1. Lung-RADS Category  2:  Stable pulmonary nodules as described above..  2. Lung-RADS Category S:    Negative.  3. Emphysematous changes within the lungs. 4. Hepatic steatosis  RECOMMENDATIONS:  LUNG-RADS 2: Continued routine annual LDCT lung screening.  Suggest next exam on or around 10/4/2025.    CT Lung Screening Reporting and Data System (Lung-RADS 1.1)    Lung Cancer Specific Category   ACR -Guidelines Based Follow-up   Category    Assessment                  Recommendation   0  Incomplete  Further imaging recommended    1  Negative  Routine annual LDCT screen (age <80)   2  Benign appearance or behavior  Routine annual LDCT screen (age <80)   3  Probably benign  Interval short-term diagnostic LDCT (6 months)   4a  Suspicious  Short-term follow-up LDCT or PET/CT (3 months)   4b  Suspicious  Multidisciplinary Conference Review   4x  Suspicious  Category 3 or 4 nodules with other suspicious features   S  Other potentially significant findings  Follow-up based on abnormality  LungRAD scores of 3, 4A, and 4B will be reviewed in the Multidisciplinary [facility]acic Oncology Clinic      LOCATION:  [facility]    Dictated by (CST): Jeremy Rivers MD on 10/04/2024 at 9:05 AM     Finalized by (CST): Jeremy Rivers MD on 10/04/2024 at 9:12 AM         Assessment/Plan:  #1. COPD/ chronic bronchitis  Suspect may have allergy component,  postnasal gtt/sinusitis, also with underlying COPD  5/2023 PFTs with no obstruction but +air trapping and mildly reduced DLCO 64%  Previously improved on trelegy 200 but had thrush so decreased to 100 dosing last year.  Self started alternating days and reports feeling well  Given his improvement/stability, continue no inhaler use at this time     #2. Chronic rhinosinusitis  Seen/following with ENT  Advised to restart nasal rinses and nasal steroids     #3. Thrush  Previously developed with trelegy 200  Now resolved with change in inhaler dose and regimen    #4. Tobacco abuse  Former smoker, 50 pack years, quit 2017  10/2024 LDCT stable with no change in nodules, lung rads 2  Continue annual screening/scans  Next due 10/2025  Lung Cancer Counseling and Shared Decision Making Session in an Asymptomatic Smoker/Former Smoker   Nasir Beauchamp is a 74 year old male without current symptoms of lung cancer.  History   Smoking Status    Former    Types: Cigarettes   Smokeless Tobacco    Never        He received information on the importance of adherence to annual lung cancer Low Dose CT (LDCT) screening, the impact of his comorbidities and his ability or willingness to undergo diagnosis and treatment. he is in agreement and an order will be placed for CT LUNG LD SCREENING(CPT=71271).    We counseled the importance of maintaining cigarette smoking abstinence if he is a former smoker and the importance of smoking cessation if he is a current smoker and we discussed and furnished information about tobacco cessation interventions.     Olga Rojas [facility]  Pulmonary Medicine   12/17/2024

## 2024-12-23 ENCOUNTER — OFFICE VISIT (OUTPATIENT)
Dept: PHYSICAL THERAPY | Age: 74
End: 2024-12-23
Attending: ORTHOPAEDIC SURGERY
Payer: MEDICARE

## 2024-12-23 PROCEDURE — 97110 THERAPEUTIC EXERCISES: CPT

## 2024-12-23 NOTE — PROGRESS NOTES
Diagnosis:   Primary osteoarthritis of left hip (M16.12)    Insurance (Authorized # of Visits):  Medicare           Authorizing Physician: Dr. Chetan Jack MD visit: none scheduled  Fall Risk: standard         Precautions: n/a             Subjective: Patient states he is doing better. Does have soreness  in his glute. Has been able to put on socks without help for the last 4 days. Pain rating 0/10. Is using a cream for pain. Has not been doing the home exercises as much before    Objective:     Date: 12/23/24   TX#: 2/10   TherEx:   Seated lumbar flexion x 10  Standing lumbar extension x 10 - produced back pain  Standing lumbar flexion x 10 - initially had groin pain but improved with reps  Supine hip flexor stretch 2 x 1 minute  Supine flexion with ball 2 x 10  Supine hip ER stretch 3 x 30 seconds  Piriformis stretch 3 x 30 seconds  Quad set 5 second hold x 20     HEP: supine hip flexor stretch, supine hip ER stretch, piriformis stretch (all 3 x 30 seconds, 3 times a day)     Assessment: Modified assessment of lumbar spine directional preference demonstrates decreased pain with seated and standing lumbar flexion. Patient to continue with repeated flexion and to discontinue if symptoms increase or peripheralize.      Goals:   Pt will improve hip ABD and ER strength to 4/5 to increase ease with standing and walking   Pt will be able to put on socks with <2/10 hip pain   Pt will improve functional hip strength to report ability to ascend/descend 1 flight of stairs reciprocally without use of handrail   Pt will be independent and compliant with comprehensive HEP to maintain progress achieved in PT     Plan: Re-assess next session and continue with (L) hip ROM, strengthening, stability, balance, gait, proprioceptive exercises, patient education, and HEP progression.      Charges: 3TE       Total Timed Treatment: 45 min  Total Treatment Time: 45 min

## 2024-12-26 ENCOUNTER — OFFICE VISIT (OUTPATIENT)
Dept: PHYSICAL THERAPY | Age: 74
End: 2024-12-26
Attending: ORTHOPAEDIC SURGERY
Payer: MEDICARE

## 2024-12-26 PROCEDURE — 97110 THERAPEUTIC EXERCISES: CPT

## 2024-12-26 NOTE — PROGRESS NOTES
Diagnosis:   Primary osteoarthritis of left hip (M16.12)    Insurance (Authorized # of Visits):  Medicare           Authorizing Physician: Dr. Chetan Jakc MD visit: none scheduled  Fall Risk: standard         Precautions: n/a             Subjective: Patient states he is doing better. Pain in the glute/side is getting better but is having more pain in the groin.    Objective:   12/26/24 no pain in sitting, sit to stand improved with stability.     Date: 12/23/24   TX#: 2/10 Date 12/26/24   Tx 3/10   TherEx:   Seated lumbar flexion x 10  Standing lumbar extension x 10 - produced back pain  Standing lumbar flexion x 10 - initially had groin pain but improved with reps  Supine hip flexor stretch 2 x 1 minute  Supine flexion with ball 2 x 10  Supine hip ER stretch 3 x 30 seconds  Piriformis stretch 3 x 30 seconds  Quad set 5 second hold x 20   TherEx:  Standing lumbar flexion x 10 - no pain  Standing lumbar extension x 10  - produced glute pain  Seated lumbar flexion x 10  Seated lumbar flexion with overpressure (pull legs of chair) x 10  Supine hip flexor stretch 2 x 1 minute  Supine flexion with ball 2 x 10  Supine hip ER stretch 3 x 30 seconds  Piriformis stretch 3 x 30 seconds  SLR 2 x 10  Heel slide 2 x 10  Manual: roller to hip flexors, hip adductors x 5 minutes        HEP: supine hip flexor stretch, supine hip ER stretch, piriformis stretch (all 3 x 30 seconds, 3 times a day), seated lumbar flexion, SLR    Assessment: After discussion with patient, patient was doing exercises incorrectly at home. Instructed patient to only do lumbar flexion at this time as extension was producing glute pain. Added straight leg raise of home exercise and provided handout.      Goals:   Pt will improve hip ABD and ER strength to 4/5 to increase ease with standing and walking   Pt will be able to put on socks with <2/10 hip pain   Pt will improve functional hip strength to report ability to ascend/descend 1 flight of stairs  reciprocally without use of handrail   Pt will be independent and compliant with comprehensive HEP to maintain progress achieved in PT     Plan: Re-assess next session and continue with (L) hip ROM, strengthening, stability, balance, gait, proprioceptive exercises, patient education, and HEP progression.      Charges: 3TE       Total Timed Treatment: 45 min  Total Treatment Time: 45 min

## 2025-01-02 ENCOUNTER — OFFICE VISIT (OUTPATIENT)
Dept: PHYSICAL THERAPY | Age: 75
End: 2025-01-02
Attending: ORTHOPAEDIC SURGERY
Payer: MEDICARE

## 2025-01-02 PROCEDURE — 97140 MANUAL THERAPY 1/> REGIONS: CPT

## 2025-01-02 PROCEDURE — 97110 THERAPEUTIC EXERCISES: CPT

## 2025-01-02 NOTE — PROGRESS NOTES
Diagnosis:   Primary osteoarthritis of left hip (M16.12)    Insurance (Authorized # of Visits):  Medicare           Authorizing Physician: Dr. Chetan Jack MD visit: none scheduled  Fall Risk: standard         Precautions: n/a             Subjective: \" I have less pain in my left anterior hip and L groin and I was able to put socks on on my own without pain  \".    Objective:   12/26/24 no pain in sitting, sit to stand improved with stability.     Date: 12/23/24   TX#: 2/10 Date 12/26/24   Tx 3/10 Date : 01/02/25  TX # 4/10   TherEx:   Seated lumbar flexion x 10  Standing lumbar extension x 10 - produced back pain  Standing lumbar flexion x 10 - initially had groin pain but improved with reps  Supine hip flexor stretch 2 x 1 minute  Supine flexion with ball 2 x 10  Supine hip ER stretch 3 x 30 seconds  Piriformis stretch 3 x 30 seconds  Quad set 5 second hold x 20   TherEx:  Standing lumbar flexion x 10 - no pain  Standing lumbar extension x 10  - produced glute pain  Seated lumbar flexion x 10  Seated lumbar flexion with overpressure (pull legs of chair) x 10  Supine hip flexor stretch 2 x 1 minute  Supine flexion with ball 2 x 10  Supine hip ER stretch 3 x 30 seconds  Piriformis stretch 3 x 30 seconds  SLR 2 x 10  Heel slide 2 x 10  Manual: roller to hip flexors, hip adductors x 5 minutes      TherEx :  Standing lumbar flexion x 10  - no pain   Supine :  BKTC with heels on SB 2 x 10   Manual L hip flexor stretch ( modified Mango stretch ) 5 x 10 sec hold   Manual L piriformis stretch ( IR/ER ) x 10   Self L piriformis stretch x 10 x 10 secs hold   Alt marches with TrA activation 2 x 10   Adductors activation with yellow ball 2 x 10   Abductors activation with fitness ring 2 x 10   Seated :   Lumbar flexion 2 x 10   Shuttle :  B leg press with 25 lbs 2 x 10   R/L leg press with 12 lbs 2 x 10   Manual : x 10 min   STM / manual rolling over L lateral hip and ITB , hip flexor , quad and hip adductors        HEP: supine  hip flexor stretch, supine hip ER stretch, piriformis stretch (all 3 x 30 seconds, 3 times a day), seated lumbar flexion, SLR    Assessment: continued with flexion based ex's protocol  for lumbar spine and initiated hip strength ex's performed in supine position and initiated leg press on the shuttle  to further progress distal trunk and hip functional stability with gait and during stairs negotiation . Provided verbal and manual cueing to ensure correct glut med activation during hip abduction with ring fitness .Patient was able to complete all given activities without report of increase in right hip pain level .      Goals:   Pt will improve hip ABD and ER strength to 4/5 to increase ease with standing and walking   Pt will be able to put on socks with <2/10 hip pain   Pt will improve functional hip strength to report ability to ascend/descend 1 flight of stairs reciprocally without use of handrail   Pt will be independent and compliant with comprehensive HEP to maintain progress achieved in PT     Plan: Re-assess next session and continue with (L) hip ROM, strengthening, stability, balance, gait, proprioceptive exercises, patient education, and HEP progression.      Charges: 2TE , manual x 1    Total Timed Treatment: 45 min  Total Treatment Time: 45 min

## 2025-01-06 ENCOUNTER — OFFICE VISIT (OUTPATIENT)
Dept: PHYSICAL THERAPY | Age: 75
End: 2025-01-06
Attending: ORTHOPAEDIC SURGERY
Payer: MEDICARE

## 2025-01-06 PROCEDURE — 97110 THERAPEUTIC EXERCISES: CPT

## 2025-01-06 PROCEDURE — 97140 MANUAL THERAPY 1/> REGIONS: CPT

## 2025-01-06 NOTE — PROGRESS NOTES
Diagnosis:   Primary osteoarthritis of left hip (M16.12)    Insurance (Authorized # of Visits):  Medicare           Authorizing Physician: Dr. Chetan Jack MD visit: none scheduled  Fall Risk: standard         Precautions: n/a             Subjective: \" I have more pain on the left side of my lower back and my left hip . I did a lot of pushing , pulling and lifting taking down [employer] and this morning I tripped over on black ice . Current pain 6/10 . \"  Pain level : 6/10    Objective:   12/26/24 no pain in sitting, sit to stand improved with stability.     Date: 12/23/24   TX#: 2/10 Date 12/26/24   Tx 3/10 Date : 01/02/25  TX # 4/10 Date : 01/06/25  TX # 5/10   TherEx:   Seated lumbar flexion x 10  Standing lumbar extension x 10 - produced back pain  Standing lumbar flexion x 10 - initially had groin pain but improved with reps  Supine hip flexor stretch 2 x 1 minute  Supine flexion with ball 2 x 10  Supine hip ER stretch 3 x 30 seconds  Piriformis stretch 3 x 30 seconds  Quad set 5 second hold x 20   TherEx:  Standing lumbar flexion x 10 - no pain  Standing lumbar extension x 10  - produced glute pain  Seated lumbar flexion x 10  Seated lumbar flexion with overpressure (pull legs of chair) x 10  Supine hip flexor stretch 2 x 1 minute  Supine flexion with ball 2 x 10  Supine hip ER stretch 3 x 30 seconds  Piriformis stretch 3 x 30 seconds  SLR 2 x 10  Heel slide 2 x 10  Manual: roller to hip flexors, hip adductors x 5 minutes      TherEx :  Standing lumbar flexion x 10  - no pain   Supine :  BKTC with heels on SB 2 x 10   Manual L hip flexor stretch ( modified Mango stretch ) 5 x 10 sec hold   Manual L piriformis stretch ( IR/ER ) x 10   Self L piriformis stretch x 10 x 10 secs hold   Alt marches with TrA activation 2 x 10   Adductors activation with yellow ball 2 x 10   Abductors activation with fitness ring 2 x 10   Seated :   Lumbar flexion 2 x 10   Shuttle :  B leg press with 25 lbs 2 x 10   R/L leg  press with 12 lbs 2 x 10   Manual : x 10 min   STM / manual rolling over L lateral hip and ITB , hip flexor , quad and hip adductors      TherEx :  NU step x 6 mins , level 3  Supine :  AAROM SKTC   2 x 10  Manual L hip gentle stretch ( ER/IR )   2 X 10   Supine :  AAROM hip flex 2 x 10   Butterfly groin manual stretch 10 x 10 sec hold   LTR ( manual stretch ) with lower legs on SB  10 x 10 sec hold                     Manual Therapy x 20 mins  STM/ manual rolling over L lateral hip and ITB , hip flexor  in side lying , quad and hip adductor , STM to left hip flexor   CP to hip flexor and lateral hip with patient in side lying position x 10 min    HEP: supine hip flexor stretch, supine hip ER stretch, piriformis stretch (all 3 x 30 seconds, 3 times a day), seated lumbar flexion, SLR    Assessment: session focused on decreasing  tenderness and tightness in left hip flexor , quad and lateral hip following patient  report of tripping over on black ice. Patient reported significant decrease in hip flexor tenderness and demonstrated improved gait pattern after this session .       Goals:   Pt will improve hip ABD and ER strength to 4/5 to increase ease with standing and walking   Pt will be able to put on socks with <2/10 hip pain   Pt will improve functional hip strength to report ability to ascend/descend 1 flight of stairs reciprocally without use of handrail   Pt will be independent and compliant with comprehensive HEP to maintain progress achieved in PT     Plan: Re-assess next session and continue with (L) hip ROM, strengthening, stability, balance, gait, proprioceptive exercises, patient education, and HEP progression.      Charges: 1TE , manual x 2    Total Timed Treatment: 45 min  Total Treatment Time: 45 min

## 2025-01-08 ENCOUNTER — OFFICE VISIT (OUTPATIENT)
Dept: PHYSICAL THERAPY | Age: 75
End: 2025-01-08
Attending: ORTHOPAEDIC SURGERY
Payer: MEDICARE

## 2025-01-08 PROCEDURE — 97140 MANUAL THERAPY 1/> REGIONS: CPT

## 2025-01-08 PROCEDURE — 97110 THERAPEUTIC EXERCISES: CPT

## 2025-01-08 NOTE — PROGRESS NOTES
Diagnosis:   Primary osteoarthritis of left hip (M16.12)    Insurance (Authorized # of Visits):  Medicare           Authorizing Physician: Dr. Chetan Jack MD visit: none scheduled  Fall Risk: standard         Precautions: n/a             Subjective: \" My hip feels a little better since last visit and I have decrease pain level to 5/10 but it still continue to feel very tender to touch and I still can't not to put my socks on due to hip tightness and pain in my groin but I do not have to use the can any more when I walk \".  Pain level : 5/10    Objective:   12/26/24 no pain in sitting, sit to stand improved with stability.     Date: 12/23/24   TX#: 2/10 Date 12/26/24   Tx 3/10 Date : 01/02/25  TX # 4/10 Date : 01/06/25  TX # 5/10 Date : 01/08/25  TX # 6/10    TherEx:   Seated lumbar flexion x 10  Standing lumbar extension x 10 - produced back pain  Standing lumbar flexion x 10 - initially had groin pain but improved with reps  Supine hip flexor stretch 2 x 1 minute  Supine flexion with ball 2 x 10  Supine hip ER stretch 3 x 30 seconds  Piriformis stretch 3 x 30 seconds  Quad set 5 second hold x 20   TherEx:  Standing lumbar flexion x 10 - no pain  Standing lumbar extension x 10  - produced glute pain  Seated lumbar flexion x 10  Seated lumbar flexion with overpressure (pull legs of chair) x 10  Supine hip flexor stretch 2 x 1 minute  Supine flexion with ball 2 x 10  Supine hip ER stretch 3 x 30 seconds  Piriformis stretch 3 x 30 seconds  SLR 2 x 10  Heel slide 2 x 10  Manual: roller to hip flexors, hip adductors x 5 minutes      TherEx :  Standing lumbar flexion x 10  - no pain   Supine :  BKTC with heels on SB 2 x 10   Manual L hip flexor stretch ( modified Mango stretch ) 5 x 10 sec hold   Manual L piriformis stretch ( IR/ER ) x 10   Self L piriformis stretch x 10 x 10 secs hold   Alt marches with TrA activation 2 x 10   Adductors activation with yellow ball 2 x 10   Abductors activation with fitness ring 2 x 10    Seated :   Lumbar flexion 2 x 10   Shuttle :  B leg press with 25 lbs 2 x 10   R/L leg press with 12 lbs 2 x 10   Manual : x 10 min   STM / manual rolling over L lateral hip and ITB , hip flexor , quad and hip adductors      TherEx :  NU step x 6 mins , level 3  Supine :  AAROM SKTC   2 x 10  Manual L hip gentle stretch ( ER/IR )   2 X 10   Supine :  AAROM hip flex 2 x 10    groin manual stretch 10 x 10 sec hold   LTR ( manual stretch ) with lower legs on SB  10 x 10 sec hold                     Manual Therapy x 20 mins  STM/ manual rolling over L lateral hip and ITB , hip flexor  in side lying , quad and hip adductor , STM to left hip flexor   CP to hip flexor and lateral hip with patient in side lying position x 10 min  TherEx :    Supine :  AAROM hip flex with heel on sliding board 2 x 10   Butterfly stretch 10 x 10 sec hold   Self piriformis ( ER/IR ) stretch 10 x 10 secs hold   Adductors activation with yellow ball   2 x 10   Abductors activation with fitness ring   2 x 10   Side lying :  L hip flexor manual stretch 10 x 10 secs hold   L AROM clam shell   2 x 10         Manual Therapy x 15 mins :  STM / manual rolling over L lateral hip and ITB , hip flexor and quad , STM to L hip flexor    HEP: supine hip flexor stretch, supine hip ER stretch, piriformis stretch (all 3 x 30 seconds, 3 times a day), seated lumbar flexion, SLR    Assessment: patient continue to present with moderate tenderness and  tightness in left hip flexor , quad and lateral hip upon palpation . Continued with STM and gentle passive stretches to restore hip mobility and to decrease soft tissue tenderness . Patient reported significant decrease in hip flexor tenderness and demonstrated improved gait pattern without the uses on cane .       Goals:   Pt will improve hip ABD and ER strength to 4/5 to increase ease with standing and walking   Pt will be able to put on socks with <2/10 hip pain   Pt will improve functional hip strength to report  ability to ascend/descend 1 flight of stairs reciprocally without use of handrail   Pt will be independent and compliant with comprehensive HEP to maintain progress achieved in PT     Plan: Re-assess next session and continue with (L) hip ROM, strengthening, stability, balance, gait, proprioceptive exercises, patient education, and HEP progression.      Charges: 1TE , manual x 2    Total Timed Treatment: 45 min  Total Treatment Time: 45 min

## 2025-01-13 ENCOUNTER — APPOINTMENT (OUTPATIENT)
Dept: PHYSICAL THERAPY | Age: 75
End: 2025-01-13
Attending: ORTHOPAEDIC SURGERY
Payer: MEDICARE

## 2025-01-16 ENCOUNTER — APPOINTMENT (OUTPATIENT)
Dept: PHYSICAL THERAPY | Age: 75
End: 2025-01-16
Attending: ORTHOPAEDIC SURGERY
Payer: MEDICARE

## 2025-01-20 NOTE — TELEPHONE ENCOUNTER
PASS  LOV 12/11/24  FU 6 MONTHS      Medication Quantity Refills Start End   rosuvastatin 10 MG Oral Tab 90 tablet 1 7/17/2024 --   Sig:   TAKE 1 TABLET(10 MG) BY MOUTH EVERY NIGHT         Future Appointments   Date Time Provider Department Center   1/21/2025  9:15 AM Sharee Garcia, PT, DPT, Cert.MDT [facility]   1/24/2025 10:15 AM Marion Beckford PTA [facility]   1/27/2025  9:15 AM Sharee Garcia, PT, DPT, Cert.MDT [facility]   2/17/2025  8:40 AM Fili Hines MD [facility] [address]   6/11/2025  8:00 AM Staci Miguel MD [facility] [address]   10/14/2025  8:20 AM Chase Stokes MD [facility] [id number] [place]

## 2025-01-21 ENCOUNTER — OFFICE VISIT (OUTPATIENT)
Dept: PHYSICAL THERAPY | Age: 75
End: 2025-01-21
Attending: ORTHOPAEDIC SURGERY
Payer: MEDICARE

## 2025-01-21 PROCEDURE — 97140 MANUAL THERAPY 1/> REGIONS: CPT

## 2025-01-21 PROCEDURE — 97110 THERAPEUTIC EXERCISES: CPT

## 2025-01-21 RX ORDER — ROSUVASTATIN CALCIUM 10 MG/1
10 TABLET, COATED ORAL NIGHTLY
Qty: 90 TABLET | Refills: 3 | Status: SHIPPED | OUTPATIENT
Start: 2025-01-21

## 2025-01-21 NOTE — PROGRESS NOTES
Diagnosis:   Primary osteoarthritis of left hip (M16.12)    Insurance (Authorized # of Visits):  Medicare           Authorizing Physician: Dr. Chetan Jack MD visit: none scheduled  Fall Risk: standard         Precautions: n/a             Subjective: Patient states that his relief is short term. Patient states he was not able to do his exercises for over a week because he as out of town. Icing at night.  Pain level : 4/10 at lateral L hip and L groin at rest.    Objective:   12/26/24 no pain in sitting, sit to stand improved with stability.     Date: 12/23/24   TX#: 2/10 Date 12/26/24   Tx 3/10 Date : 01/02/25  TX # 4/10 Date : 01/06/25  TX # 5/10 Date : 01/08/25  TX # 6/10  Date 01/21/25   Tx    TherEx:   Seated lumbar flexion x 10  Standing lumbar extension x 10 - produced back pain  Standing lumbar flexion x 10 - initially had groin pain but improved with reps  Supine hip flexor stretch 2 x 1 minute  Supine flexion with ball 2 x 10  Supine hip ER stretch 3 x 30 seconds  Piriformis stretch 3 x 30 seconds  Quad set 5 second hold x 20   TherEx:  Standing lumbar flexion x 10 - no pain  Standing lumbar extension x 10  - produced glute pain  Seated lumbar flexion x 10  Seated lumbar flexion with overpressure (pull legs of chair) x 10  Supine hip flexor stretch 2 x 1 minute  Supine flexion with ball 2 x 10  Supine hip ER stretch 3 x 30 seconds  Piriformis stretch 3 x 30 seconds  SLR 2 x 10  Heel slide 2 x 10  Manual: roller to hip flexors, hip adductors x 5 minutes      TherEx :  Standing lumbar flexion x 10  - no pain   Supine :  BKTC with heels on SB 2 x 10   Manual L hip flexor stretch ( modified Mango stretch ) 5 x 10 sec hold   Manual L piriformis stretch ( IR/ER ) x 10   Self L piriformis stretch x 10 x 10 secs hold   Alt marches with TrA activation 2 x 10   Adductors activation with yellow ball 2 x 10   Abductors activation with fitness ring 2 x 10   Seated :   Lumbar flexion 2 x 10   Shuttle :  B leg press with  25 lbs 2 x 10   R/L leg press with 12 lbs 2 x 10   Manual : x 10 min   STM / manual rolling over L lateral hip and ITB , hip flexor , quad and hip adductors      TherEx :  NU step x 6 mins , level 3  Supine :  AAROM SKTC   2 x 10  Manual L hip gentle stretch ( ER/IR )   2 X 10   Supine :  AAROM hip flex 2 x 10    groin manual stretch 10 x 10 sec hold   LTR ( manual stretch ) with lower legs on SB  10 x 10 sec hold                     Manual Therapy x 20 mins  STM/ manual rolling over L lateral hip and ITB , hip flexor  in side lying , quad and hip adductor , STM to left hip flexor   CP to hip flexor and lateral hip with patient in side lying position x 10 min  TherEx :    Supine :  AAROM hip flex with heel on sliding board 2 x 10   Butterfly stretch 10 x 10 sec hold   Self piriformis ( ER/IR ) stretch 10 x 10 secs hold   Adductors activation with yellow ball   2 x 10   Abductors activation with fitness ring   2 x 10   Side lying :  L hip flexor manual stretch 10 x 10 secs hold   L AROM clam shell   2 x 10         Manual Therapy x 15 mins :  STM / manual rolling over L lateral hip and ITB , hip flexor and quad , STM to L hip flexor  TherEx:  Supine hip flexor stretch off edge of bed 2 x 45 seconds   AAROM hip flex with heel on sliding board 2 x 10   Supine hip ER stretch 2 x 45 seconds   SLR (active assisted) 2 x 10  Adductors activation with yellow ball 2 x 10   S/L hip abduction 2 x 10  Sidelying clams 2 x 10   Cane adjustment x 1 minute   Manual Therapy x 10 mins:   STM / manual rolling over L lateral hip and ITB , hip flexor and quad , STM to L hip flexor      HEP: supine hip flexor stretch, supine hip ER stretch, piriformis stretch (all 3 x 30 seconds, 3 times a day), seated lumbar flexion, SLR    Assessment: Had discussion with patient regarding importance of completing HEP to improve left hip strength. Patient did require assistance with straight leg raise. Able to progress hip abduction and external rotation  strengthening to sidelying. Requires reminders to use cane on right side.       Goals:   Pt will improve hip ABD and ER strength to 4/5 to increase ease with standing and walking   Pt will be able to put on socks with <2/10 hip pain   Pt will improve functional hip strength to report ability to ascend/descend 1 flight of stairs reciprocally without use of handrail   Pt will be independent and compliant with comprehensive HEP to maintain progress achieved in PT     Plan: Re-assess next session and continue with (L) hip ROM, strengthening, stability, balance, gait, proprioceptive exercises, patient education, and HEP progression.      Charges: 1TE , manual x 2    Total Timed Treatment: 45 min  Total Treatment Time: 45 min

## 2025-01-24 ENCOUNTER — OFFICE VISIT (OUTPATIENT)
Dept: PHYSICAL THERAPY | Age: 75
End: 2025-01-24
Attending: ORTHOPAEDIC SURGERY
Payer: MEDICARE

## 2025-01-24 PROCEDURE — 97113 AQUATIC THERAPY/EXERCISES: CPT

## 2025-01-24 PROCEDURE — 97110 THERAPEUTIC EXERCISES: CPT

## 2025-01-24 NOTE — PROGRESS NOTES
Diagnosis:   Primary osteoarthritis of left hip (M16.12)    Insurance (Authorized # of Visits):  Medicare           Authorizing Physician: Dr. Chetan Jack MD visit: none scheduled  Fall Risk: standard         Precautions: n/a             Subjective: \" I continue to have a lot of pain on the lateral hip and in my left buttock with prolonged standing and walking activities .Some times a feel pulling sensation in my left groin and I have some pain in my hip during the night especially when I try to turn from side to side . Getting out of bed in AM is difficult due to hip stiffness . Overall my hip feels the same since the start of PT . I would like to hold PT visits till I see Dr Hines for consultation \".  Pain level : 5-6 /10 at lateral L hip and L groin at rest and L buttock with gait .  Objective:     ( 01/24/2025 )  AROM R hip : flexion WNL , extension WNL   AROM L hip :flexion mod loss , extension WNL  AROM R hip : ER  45 deg , IR  25 deg   AROM L hip :ER 20 deg , IR 10 deg   Strength / MMT R hip : flexion 4+/5 , abduction 4+/5 , ER 4+/5 , IR 4+/5   Strength / MMT L hip : flexion 3+/5 end range pain , abduction 3+/5 end range pain , ER 3+/5 , IR 3/5  Gait : patient ambulates on level ground with moderate antalgia on L LE and , decreased step length and with slow vee using straight cane .    Assessment :  Mrs Best made very little progress in left hip AROM and in strength since the start of PT . Patient continue to report increase in pain level in his left hip with walking , stairs negotiation , prolonged standing and laying on the left side . Patient continue to reports difficulties putting his socks on and picking objects from floor due to hip tightness . Patient would like to put his PT sessions on hold till he sees his ortho MD for consultation .    Date : 01/06/25  TX # 5/10 Date : 01/08/25  TX # 6/10  Date 01/21/25   Tx  Date : 01/24/25  TX # 7/10   TherEx :  NU step x 6 mins , level 3  Supine :  JUAN  SKTC   2 x 10  Manual L hip gentle stretch ( ER/IR )   2 X 10   Supine :  AAROM hip flex 2 x 10    groin manual stretch 10 x 10 sec hold   LTR ( manual stretch ) with lower legs on SB  10 x 10 sec hold                     Manual Therapy x 20 mins  STM/ manual rolling over L lateral hip and ITB , hip flexor  in side lying , quad and hip adductor , STM to left hip flexor   CP to hip flexor and lateral hip with patient in side lying position x 10 min  TherEx :    Supine :  AAROM hip flex with heel on sliding board 2 x 10   Butterfly stretch 10 x 10 sec hold   Self piriformis ( ER/IR ) stretch 10 x 10 secs hold   Adductors activation with yellow ball   2 x 10   Abductors activation with fitness ring   2 x 10   Side lying :  L hip flexor manual stretch 10 x 10 secs hold   L AROM clam shell   2 x 10         Manual Therapy x 15 mins :  STM / manual rolling over L lateral hip and ITB , hip flexor and quad , STM to L hip flexor  TherEx:  Supine hip flexor stretch off edge of bed 2 x 45 seconds   AAROM hip flex with heel on sliding board 2 x 10   Supine hip ER stretch 2 x 45 seconds   SLR (active assisted) 2 x 10  Adductors activation with yellow ball 2 x 10   S/L hip abduction 2 x 10  Sidelying clams 2 x 10   Cane adjustment x 1 minute   Manual Therapy x 10 mins:   STM / manual rolling over L lateral hip and ITB , hip flexor and quad , STM to L hip flexor    TherEx : 20 mins :  Supine :  Manual hip flexor stretch off edge of bed   3 x 30 secs   AAROM L hip flex with heel on SB 2 x 10   Piriformis stretches ( IR/ER in modified figure 4 position )   10 x 10 secs hold   Adductors activation with yellow ball   2 x 10   Abductors activation with fitness ring   2 x 10   Alt marches 2 x 10   Side lying :  AROM clam shell   2 x 10   Seated :  Lumbar trunk flexion self stretch   10 x 10 secs   Manual Therapy x 25 mins :  Side lying :  STM / FR over lateral hip an ITB   Manual hip flexion and piriformis stretch    HEP: supine hip  flexor stretch, supine hip ER stretch, piriformis stretch (all 3 x 30 seconds, 3 times a day), seated lumbar flexion, SLR  Goals:   Pt will improve hip ABD and ER strength to 4/5 to increase ease with standing and walking - ONGOING   Pt will be able to put on socks with <2/10 hip pain - ONGOING   Pt will improve functional hip strength to report ability to ascend/descend 1 flight of stairs reciprocally without use of handrail - ONGOING   Pt will be independent and compliant with comprehensive HEP to maintain progress achieved in PT - ONGOING     Plan:  Will hold PT as this time ( as per current patient request )   Charges: 1TE , manual x 2    Total Timed Treatment: 45 min  Total Treatment Time: 45 min

## 2025-01-27 ENCOUNTER — APPOINTMENT (OUTPATIENT)
Dept: PHYSICAL THERAPY | Age: 75
End: 2025-01-27
Attending: ORTHOPAEDIC SURGERY
Payer: MEDICARE

## 2025-02-03 ENCOUNTER — OFFICE VISIT (OUTPATIENT)
Facility: CLINIC | Age: 75
End: 2025-02-03
Payer: MEDICARE

## 2025-02-03 VITALS — BODY MASS INDEX: 31.04 KG/M2 | WEIGHT: 207.19 LBS | HEIGHT: 68.5 IN

## 2025-02-03 DIAGNOSIS — M25.552 HIP PAIN, LEFT: ICD-10-CM

## 2025-02-03 DIAGNOSIS — M16.12 PRIMARY OSTEOARTHRITIS OF LEFT HIP: Primary | ICD-10-CM

## 2025-02-03 PROCEDURE — 99213 OFFICE O/P EST LOW 20 MIN: CPT | Performed by: ORTHOPAEDIC SURGERY

## 2025-02-03 RX ORDER — IPRATROPIUM BROMIDE 42 UG/1
SPRAY, METERED NASAL
COMMUNITY
Start: 2025-02-01

## 2025-02-03 NOTE — PROGRESS NOTES
[facility] Progress Note    Subjective: Patient returns to discuss his left hip.  He states the injection performed by me gave no relief for any period of time.  He remains with pain around the proximal lateral thigh, radiation into the groin.    Objective: Patient appears comfortable, very pleasant.  Active left hip flexion does reproduce pain around the proximal lateral thigh, no pain with passive flexion.  External rotation 25, internal rotation 10, does not cause pain but is less range of motion compared to the right hip external 45/internal 15-20.      Reports: Previous note by my partner Dr. Cabezas from April 2024 reviewed.  Discusses 80% back pain/20% leg pain, on the right.  Lumbar spine x-rays interpreted as degenerative disc disease L3-S1, impression with low back pain and degenerative disc disease possible spinal stenosis.      Assessment/Plan: 74-year-old male with left hip osteoarthritis, radiographically moderate joint space narrowing, with hip and thigh pain.  We discussed potential etiologies of his symptoms.  History of pain/location, range of motion on exam, pain with active hip flexion and imaging would all support the hip being a source of discomfort, however he does have other possible etiologies, and he reports 0 relief for any period of time from intra-articular left hip injection.  We did discuss the possibility of hip replacement for hip arthritis, also discussed that if his pain is secondary to another etiology hip replacement may not provide any relief of his symptoms.  I would recommend obtaining further data prior to considering hip replacement surgery.  I did discuss possibility of repeating injection as he is at 3 months, and encouraged setting him up with my partner Dr. Acharya for evaluation and possible intra-articular left hip injection both for diagnostic and potentially therapeutic purposes.  If he does get relief from the injection a second time, and the symptom relief does  not last long, he may contact us for discussion of hip replacement surgery scheduling.  If he gets 0 relief from intra-articular hip injection a second time, would consider next step reevaluation with Dr. Cabezas, in particular given recommendation at his last visit for lumbar spine MRI as a next step.  Although the spine may not be the source of his symptoms, would want to have more information prior to us proceeding with hip replacement surgery.  He expressed understanding and agreement.    Fili Hines MD, FAAOS  [facility]  Phone [phone]  Fax [phone]

## 2025-02-10 ENCOUNTER — OFFICE VISIT (OUTPATIENT)
Facility: CLINIC | Age: 75
End: 2025-02-10
Payer: MEDICARE

## 2025-02-10 VITALS — WEIGHT: 207 LBS | BODY MASS INDEX: 31.01 KG/M2 | HEIGHT: 68.5 IN

## 2025-02-10 DIAGNOSIS — M48.062 SPINAL STENOSIS OF LUMBAR REGION WITH NEUROGENIC CLAUDICATION: Primary | ICD-10-CM

## 2025-02-10 DIAGNOSIS — M54.16 LUMBAR RADICULOPATHY: ICD-10-CM

## 2025-02-10 DIAGNOSIS — M16.12 PRIMARY OSTEOARTHRITIS OF LEFT HIP: ICD-10-CM

## 2025-02-10 PROCEDURE — 99214 OFFICE O/P EST MOD 30 MIN: CPT | Performed by: FAMILY MEDICINE

## 2025-02-10 RX ORDER — GABAPENTIN 300 MG/1
300 CAPSULE ORAL 3 TIMES DAILY
Qty: 90 CAPSULE | Refills: 1 | Status: SHIPPED | OUTPATIENT
Start: 2025-02-10 | End: 2025-04-11

## 2025-02-10 NOTE — H&P
Sports Medicine Clinic Note    Subjective:    Chief Complaint: Left hip pain; seeking second opinion regarding ongoing symptoms.    History: 74-year-old male returns for follow-up regarding left hip pain. He previously underwent an intra-articular left hip injection performed by Dr. Hines on 11/11/24, which provided no relief. The patient reports persistent pain localized around the proximal lateral thigh with radiation into the groin. He has a known history of lumbar spondylosis, and prior evaluation raised concerns for possible spinal stenosis as an alternative pain generator. He does have claudication symptoms with ambulation more than 2 blocks in left lower leg. He reports he is able to ambulate longer distances when he is in a flexed position such as leaning over a shopping cart at the store.    Objective:    Body mass index is 31.02 kg/m².    Left Hip Examination:    Inspection: No erythema, swelling, or visible deformity.  Palpation: Tenderness over the proximal lateral thigh region.  Range of Motion:  Active hip flexion reproduces pain in the proximal lateral thigh.  Passive flexion is painless.  External rotation: 25 degrees (right hip: 45 degrees).  Internal rotation: 10 degrees (right hip: 15-20 degrees).  Neurovascular: Sensation intact in the femoral, sciatic, and obturator nerve distributions. Dorsalis pedis and posterior tibial pulses palpable.  Special Tests:  Pain reproduced with SLR left side. No pain with FADIR and TAMAR testing.    Diagnostic Tests:    Left Hip X-rays (10/21/24): Moderate joint space narrowing, subchondral sclerosis, possible subchondral cysts at the femoral head-neck junction, consistent with Kellgren-Juan grade 3 osteoarthritis.    Lumbar Spine X-rays (4/19/24): Degenerative levoscoliosis, mild posterior subluxation of L3 on L4, anterior subluxation of L4 on L5, disc space narrowing at L1-2 and L3-4, facet arthropathy, and aortic calcifications. No acute  abnormalities.    Assessment:    Left hip osteoarthritis (moderate severity).  Possible lumbar spine pathology contributing to symptoms (lumbar spondylosis, degenerative disc disease, potential spinal stenosis).  Persistent left hip and proximal thigh pain despite prior intra-articular hip injection, raising concern for alternative or multifactorial pain sources.    Plan:    Additional Workup: MRI of the lumbar spine is recommended to assess for possible neurogenic contributions to the patient’s symptoms, given his lack of response to hip injection.  Therapy: Pending MRI results, may consider physical therapy targeting both the hip and lumbar spine.  Medications: NSAIDs and acetaminophen as needed for symptom relief. Trial of gabapentin for suspected radicular pain.  Bracing/Casting: No bracing required at this time.  Procedures:  Hold off on repeat left hip injection today as previously discussed with the referring provider.  Future hip injection may be reconsidered if further diagnostic clarity is needed.  Activity Recommendations:  Encourage low-impact activities such as swimming or cycling.  Avoid prolonged standing or high-impact exercises that may exacerbate symptoms.  Consider use of an assistive device (cane or walker) if pain significantly limits mobility.    Follow-Up: Tentatively scheduled once MRI of the lumbar spine is completed to review findings and reassess treatment options, including whether surgical intervention (hip replacement vs. spinal intervention) is warranted.      Kaveh Acharya DO, CAM   Primary Care Sports Medicine

## 2025-02-11 ENCOUNTER — PATIENT MESSAGE (OUTPATIENT)
Facility: CLINIC | Age: 75
End: 2025-02-11

## 2025-02-11 DIAGNOSIS — M48.062 SPINAL STENOSIS OF LUMBAR REGION WITH NEUROGENIC CLAUDICATION: Primary | ICD-10-CM

## 2025-02-11 DIAGNOSIS — M54.16 LUMBAR RADICULOPATHY: ICD-10-CM

## 2025-02-12 NOTE — TELEPHONE ENCOUNTER
Pt has a pacer implanted 2018, they are telling him it is not compatible for MRI  Would a CT be beneficial?

## 2025-02-17 NOTE — DISCHARGE INSTRUCTIONS
Discharge/After Visit [facility] - Department of Radiology  Myelogram    Activity  After Myelogram: Remain flat in bed until the morning after the procedure. You may get up to walk to the bathroom or sit up for brief periods to eat and safely swallow. Do not do any strenuous exercises or lift over 5 lbs. for 48 hours after the procedure.      After a Cervical Myelogram: Keep your head elevated 30° until the morning after the procedure - whether in a bed or a recliner.  You may get up to walk to the bathroom or sit up for brief periods to eat and safely swallow.  Do not do any strenuous exercises or lift over 5 lbs. for the next 48 hours.      Site Care  Keep a bandage on the puncture site for 24 hours after the procedure.  You may shower after 24 hours, but no soaking in a bathtub for 7 days.    Diet  Unless you are on a fluid restriction due to a medical condition, drink lots of fluid to prevent a headache after these procedures.  Resume your usual diet. A slow return to normal foods is an ideal way to minimize nausea.    Pain Management  You may develop a headache that will typically resolve on its own in 1 to 2 days. Lying down should help relieve this pain. You may use usual over-the-counter or prescription pain medications, as needed, if it is not contraindicated due to a medical condition.    Medications  You may resume your usual home medications. If you take blood thinners, you may resume them the day after your procedure. DO NOT take aspirin, Motrin, ibuprofen, or any medications containing NSAIDS (non-steroidal anti-inflammatory drugs) for 24 hours.    When to Seek Medical Advice  Call the provider that ordered this procedure with any questions and to discuss test results. Results may also be available in My Chart. You may also contact the Radiology Nurse at [phone] Monday-Friday 8-5 with any additional questions or concerns.    Dial [phone] and ask the  to page the  on-call Interventional Radiologist if any of these occur:  Experience a severe headache or severe pain.  There is a change in color or temperature of the area where the procedure was performed.  You develop increasing pain or shortness of breath.  Unusual drowsiness, weakness, or dizziness.  Unusual vomiting.   IF YOU FEEL YOU ARE EXPERIENCING AN EMERGENCY,   CALL 911 OR GO TO THE NEAREST EMERGENCY ROOM  4.2.24 MO/  Radiology

## 2025-02-18 ENCOUNTER — HOSPITAL ENCOUNTER (OUTPATIENT)
Dept: CT IMAGING | Facility: HOSPITAL | Age: 75
Discharge: HOME | End: 2025-02-18
Attending: FAMILY MEDICINE
Payer: MEDICARE

## 2025-02-18 ENCOUNTER — NURSE ONLY (OUTPATIENT)
Dept: LAB | Facility: HOSPITAL | Age: 75
End: 2025-02-18
Attending: FAMILY MEDICINE
Payer: MEDICARE

## 2025-02-18 ENCOUNTER — HOSPITAL ENCOUNTER (OUTPATIENT)
Dept: GENERAL RADIOLOGY | Facility: HOSPITAL | Age: 75
Discharge: HOME | End: 2025-02-18
Attending: FAMILY MEDICINE
Payer: MEDICARE

## 2025-02-18 VITALS
TEMPERATURE: 97 F | SYSTOLIC BLOOD PRESSURE: 106 MMHG | HEART RATE: 72 BPM | BODY MASS INDEX: 31.01 KG/M2 | DIASTOLIC BLOOD PRESSURE: 83 MMHG | WEIGHT: 207 LBS | HEIGHT: 68.5 IN | OXYGEN SATURATION: 100 % | RESPIRATION RATE: 18 BRPM

## 2025-02-18 VITALS
HEART RATE: 75 BPM | OXYGEN SATURATION: 98 % | TEMPERATURE: 96 F | SYSTOLIC BLOOD PRESSURE: 117 MMHG | DIASTOLIC BLOOD PRESSURE: 82 MMHG | RESPIRATION RATE: 13 BRPM

## 2025-02-18 DIAGNOSIS — M48.062 SPINAL STENOSIS OF LUMBAR REGION WITH NEUROGENIC CLAUDICATION: ICD-10-CM

## 2025-02-18 DIAGNOSIS — M54.16 LUMBAR RADICULOPATHY: ICD-10-CM

## 2025-02-18 DIAGNOSIS — M48.062 SPINAL STENOSIS OF LUMBAR REGION WITH NEUROGENIC CLAUDICATION: Primary | ICD-10-CM

## 2025-02-18 LAB
ERYTHROCYTE [DISTWIDTH] IN BLOOD BY AUTOMATED COUNT: 17.4 %
GLUCOSE BLD-MCNC: 106 MG/DL (ref 70–99)
HCT VFR BLD AUTO: 41.8 %
HGB BLD-MCNC: 13.1 G/DL
INR BLD: 1.06 (ref 0.8–1.2)
MCH RBC QN AUTO: 26.4 PG (ref 26–34)
MCHC RBC AUTO-ENTMCNC: 31.3 G/DL (ref 31–37)
MCV RBC AUTO: 84.3 FL
PLATELET # BLD AUTO: 232 10(3)UL (ref 150–450)
PROTHROMBIN TIME: 13.9 SECONDS (ref 11.6–14.8)
RBC # BLD AUTO: 4.96 X10(6)UL
WBC # BLD AUTO: 9.8 X10(3) UL (ref 4–11)

## 2025-02-18 PROCEDURE — 85027 COMPLETE CBC AUTOMATED: CPT

## 2025-02-18 PROCEDURE — 36415 COLL VENOUS BLD VENIPUNCTURE: CPT

## 2025-02-18 PROCEDURE — 62304 MYELOGRAPHY LUMBAR INJECTION: CPT | Performed by: FAMILY MEDICINE

## 2025-02-18 PROCEDURE — 85610 PROTHROMBIN TIME: CPT

## 2025-02-18 PROCEDURE — 72132 CT LUMBAR SPINE W/DYE: CPT | Performed by: FAMILY MEDICINE

## 2025-02-18 PROCEDURE — 82962 GLUCOSE BLOOD TEST: CPT

## 2025-02-18 RX ORDER — DEXTROSE MONOHYDRATE 25 G/50ML
50 INJECTION, SOLUTION INTRAVENOUS
Status: CANCELLED | OUTPATIENT
Start: 2025-02-18

## 2025-02-18 RX ORDER — IOPAMIDOL 612 MG/ML
15 INJECTION, SOLUTION INTRATHECAL
Status: COMPLETED | OUTPATIENT
Start: 2025-02-18 | End: 2025-02-18

## 2025-02-18 RX ORDER — DEXTROSE MONOHYDRATE 25 G/50ML
50 INJECTION, SOLUTION INTRAVENOUS
Status: DISCONTINUED | OUTPATIENT
Start: 2025-02-18 | End: 2025-02-20

## 2025-02-18 RX ORDER — ONDANSETRON 2 MG/ML
4 INJECTION INTRAMUSCULAR; INTRAVENOUS ONCE AS NEEDED
Status: DISCONTINUED | OUTPATIENT
Start: 2025-02-18 | End: 2025-02-18

## 2025-02-18 RX ORDER — ONDANSETRON 2 MG/ML
4 INJECTION INTRAMUSCULAR; INTRAVENOUS ONCE AS NEEDED
Status: CANCELLED | OUTPATIENT
Start: 2025-02-18 | End: 2025-02-18

## 2025-02-18 RX ORDER — NICOTINE POLACRILEX 4 MG
30 LOZENGE BUCCAL
Status: CANCELLED | OUTPATIENT
Start: 2025-02-18

## 2025-02-18 RX ORDER — NICOTINE POLACRILEX 4 MG
15 LOZENGE BUCCAL
Status: CANCELLED | OUTPATIENT
Start: 2025-02-18

## 2025-02-18 RX ORDER — NICOTINE POLACRILEX 4 MG
30 LOZENGE BUCCAL
Status: DISCONTINUED | OUTPATIENT
Start: 2025-02-18 | End: 2025-02-20

## 2025-02-18 RX ORDER — NICOTINE POLACRILEX 4 MG
15 LOZENGE BUCCAL
Status: DISCONTINUED | OUTPATIENT
Start: 2025-02-18 | End: 2025-02-20

## 2025-02-18 NOTE — PROCEDURES
[facility]   part of [facility]  Procedure Note    Nasir Beauchamp Patient Status:  Outpatient    4/15/1950 MRN [id number]   Location [facility] X-RAY Attending Kaveh Acharya,    Hosp Day # 0 PCP Staci Miguel MD     Procedure: Lumbar myelogram    Pre-Procedure Diagnosis:  Radiculopathy    Post-Procedure Diagnosis: Same    Anesthesia:  Local    Findings:  L3-4 puncture, 20g spinal, 10 ml Isovue 300m injected    Specimens: None    Blood Loss:  Minimal    Tourniquet Time: None  Complications:  None  Drains:  None    Secondary Diagnosis:  None    Marciano Orozco MD  2025

## 2025-02-18 NOTE — DISCHARGE INSTRUCTIONS
Discharge/After Visit [facility] - Department of Radiology  Myelogram/Lumbar Puncture    Activity  After a Lumbar Puncture/Myelogram: Remain flat in bed until the morning after the procedure. You may get up to walk to the bathroom or sit up for brief periods to eat and safely swallow. Do not do any strenuous exercises or lift over 5 lbs. for 48 hours after the procedure.      After a Cervical Myelogram: Keep your head elevated 30° until the morning after the procedure - whether in a bed or a recliner.  You may get up to walk to the bathroom or sit up for brief periods to eat and safely swallow.  Do not do any strenuous exercises or lift over 5 lbs. for the next 48 hours.      Site Care  Keep a bandage on the puncture site for 24 hours after the procedure.  You may shower after 24 hours, but no soaking in a bathtub for 7 days.    Diet  Unless you are on a fluid restriction due to a medical condition, drink lots of fluid to prevent a headache after these procedures.  Resume your usual diet. A slow return to normal foods is an ideal way to minimize nausea.    Pain Management  You may develop a headache that will typically resolve on its own in 1 to 2 days. Lying down should help relieve this pain. You may use usual over-the-counter or prescription pain medications, as needed, if it is not contraindicated due to a medical condition.    Medications  You may resume your usual home medications. If you take blood thinners, you may resume them the day after your procedure. DO NOT take aspirin, Motrin, ibuprofen, or any medications containing NSAIDS (non-steroidal anti-inflammatory drugs) for 24 hours.    When to Seek Medical Advice  Call the provider that ordered this procedure with any questions and to discuss test results. Results may also be available in My Chart. You may also contact the Radiology Nurse at [phone] Monday-Friday 8-5 with any additional questions or concerns.    Dial [phone]  and ask the  to page the on-call Interventional Radiologist if any of these occur:  Experience a severe headache or severe pain.  There is a change in color or temperature of the area where the procedure was performed.  You develop increasing pain or shortness of breath.  Unusual drowsiness, weakness, or dizziness.  Unusual vomiting.   IF YOU FEEL YOU ARE EXPERIENCING AN EMERGENCY,   CALL 911 OR GO TO THE NEAREST EMERGENCY ROOM  4.2.24 MO/  Radiology

## 2025-02-19 ENCOUNTER — PATIENT MESSAGE (OUTPATIENT)
Facility: CLINIC | Age: 75
End: 2025-02-19

## 2025-02-24 ENCOUNTER — OFFICE VISIT (OUTPATIENT)
Facility: CLINIC | Age: 75
End: 2025-02-24
Payer: MEDICARE

## 2025-02-24 VITALS — WEIGHT: 207 LBS | BODY MASS INDEX: 31.01 KG/M2 | HEIGHT: 68.5 IN

## 2025-02-24 DIAGNOSIS — M54.16 LUMBAR RADICULOPATHY: Primary | ICD-10-CM

## 2025-02-24 DIAGNOSIS — M48.062 SPINAL STENOSIS OF LUMBAR REGION WITH NEUROGENIC CLAUDICATION: ICD-10-CM

## 2025-02-24 DIAGNOSIS — M16.12 PRIMARY OSTEOARTHRITIS OF LEFT HIP: ICD-10-CM

## 2025-02-24 DIAGNOSIS — M47.816 LUMBAR SPONDYLOSIS: ICD-10-CM

## 2025-02-24 PROCEDURE — 99214 OFFICE O/P EST MOD 30 MIN: CPT | Performed by: FAMILY MEDICINE

## 2025-02-24 RX ORDER — GABAPENTIN 600 MG/1
600 TABLET ORAL 3 TIMES DAILY
Qty: 90 TABLET | Refills: 0 | Status: SHIPPED | OUTPATIENT
Start: 2025-02-24 | End: 2025-03-26

## 2025-02-24 NOTE — PROGRESS NOTES
Sports Medicine Clinic Note    Subjective:    Chief Complaint: Persistent left hip and proximal thigh pain    History: 74-year-old male returns for follow-up regarding ongoing left hip and proximal thigh pain. Since the last visit, a lumbar myelogram has been completed - switched from MRI lumbar spine as his pacemaker was not MRI compatible. The patient continues to experience pain that radiates from the proximal lateral thigh into the groin. He also reports persistent neurogenic claudication symptoms, including left lower leg weakness and increased pain with prolonged ambulation, which improves when leaning forward. He states that gabapentin has been helpful.    Objective:    Body mass index is 31.02 kg/m².    Left Hip Examination:    Inspection: No erythema, swelling, or visible deformity.  Palpation: Tenderness over the proximal lateral thigh region.  Range of Motion:  Active hip flexion reproduces pain in the proximal lateral thigh.  Passive flexion is painless.  External rotation: 25 degrees (right hip: 45 degrees).  Internal rotation: 10 degrees (right hip: 15-20 degrees).  Neurovascular: Sensation intact in the femoral, sciatic, and obturator nerve distributions. Dorsalis pedis and posterior tibial pulses palpable.  Special Tests:  Pain reproduced with SLR on the left side.  No pain with FADIR and TAMAR testing.    Diagnostic Tests:    CT Myelogram Lumbar Spine (2/18/25): Reviewed during today's visit. Findings show mild to moderate multilevel degenerative changes, most pronounced at L3-4 and L4-5, where mild to moderate central canal stenosis and subarticular stenosis are present. There is also mild right neural foraminal stenosis at L3-4 and mild bilateral neural foraminal stenosis at L4-5. No acute fracture or significant spondylolisthesis.    Left Hip X-rays (10/21/24): Moderate joint space narrowing, subchondral sclerosis, and possible subchondral cysts at the femoral head-neck junction, consistent with  Kellgren-Juan grade 3 osteoarthritis.    Lumbar Spine X-rays (4/19/24): Degenerative levoscoliosis, mild posterior subluxation of L3 on L4, anterior subluxation of L4 on L5, disc space narrowing at L1-2 and L3-4, facet arthropathy, and aortic calcifications. No acute abnormalities.    Assessment:    Lumbar spinal stenosis (L3-4, L4-5) with neurogenic claudication - Imaging findings correlate with symptoms of left lower extremity weakness and pain with ambulation.  Left hip osteoarthritis (moderate severity) - Persistent hip pain despite prior intra-articular injection, likely multifactorial in nature.  Degenerative lumbar spondylosis with facet arthropathy - Contributing to back and lower extremity symptoms.    Plan:    Additional Workup: No further imaging needed at this time.  Therapy: Recommend physical therapy focusing on core stabilization, lumbar flexion-based exercises, and hip strengthening.  Medications: Up-titrate gabapentin to 600 mg TID. Continue NSAIDs and acetaminophen as needed for pain management.  Bracing/Casting: No bracing required.  Procedures: Refer to pain management for lumbar interventional procedures possibly to address stenosis-related symptoms.  Activity Recommendations: Encourage low-impact exercise such as swimming or cycling. Avoid prolonged standing or walking without rest periods. Consider trialing a rolling walker for ambulation support.    Follow-Up: Tentatively scheduled for pain management consult to determine next steps in management.      Kaveh Acharya DO, CAQSM   Primary Care Sports Medicine

## 2025-03-03 ENCOUNTER — HOSPITAL ENCOUNTER (EMERGENCY)
Age: 75
Discharge: HOME | End: 2025-03-03
Attending: EMERGENCY MEDICINE
Payer: MEDICARE

## 2025-03-03 ENCOUNTER — APPOINTMENT (OUTPATIENT)
Dept: GENERAL RADIOLOGY | Age: 75
End: 2025-03-03
Attending: NURSE PRACTITIONER
Payer: MEDICARE

## 2025-03-03 VITALS
RESPIRATION RATE: 18 BRPM | DIASTOLIC BLOOD PRESSURE: 88 MMHG | BODY MASS INDEX: 31.39 KG/M2 | OXYGEN SATURATION: 97 % | TEMPERATURE: 98 F | HEART RATE: 63 BPM | WEIGHT: 200 LBS | HEIGHT: 67 IN | SYSTOLIC BLOOD PRESSURE: 108 MMHG

## 2025-03-03 DIAGNOSIS — M48.061 SPINAL STENOSIS OF LUMBAR REGION, UNSPECIFIED WHETHER NEUROGENIC CLAUDICATION PRESENT: ICD-10-CM

## 2025-03-03 DIAGNOSIS — M16.10 HIP ARTHRITIS: Primary | ICD-10-CM

## 2025-03-03 PROCEDURE — 99283 EMERGENCY DEPT VISIT LOW MDM: CPT

## 2025-03-03 PROCEDURE — 99284 EMERGENCY DEPT VISIT MOD MDM: CPT

## 2025-03-03 PROCEDURE — 73552 X-RAY EXAM OF FEMUR 2/>: CPT | Performed by: NURSE PRACTITIONER

## 2025-03-03 PROCEDURE — 73502 X-RAY EXAM HIP UNI 2-3 VIEWS: CPT | Performed by: NURSE PRACTITIONER

## 2025-03-03 RX ORDER — TRAMADOL HYDROCHLORIDE 50 MG/1
50 TABLET ORAL EVERY 6 HOURS PRN
Qty: 15 TABLET | Refills: 0 | Status: SHIPPED | OUTPATIENT
Start: 2025-03-03 | End: 2025-03-08

## 2025-03-03 RX ORDER — HYDROCODONE BITARTRATE AND ACETAMINOPHEN 5; 325 MG/1; MG/1
1 TABLET ORAL ONCE
Status: COMPLETED | OUTPATIENT
Start: 2025-03-03 | End: 2025-03-03

## 2025-03-03 NOTE — ED PROVIDER NOTES
Patient Seen in: [facility] In [place]      History     Chief Complaint   Patient presents with    Leg or Foot Injury     Stated Complaint: left leg pain    Subjective:   73 yo male presents to the emergency department with c/o fall.  Patient states he was out with the dog around 4 AM when he fell.  He states he went straight down and did not hit his head.  He has pain to his left leg that is worse when walking or standing.  He states that he has been seen orthopedics for some chronic hip/back pain to the left side.  He complain of pain primarily to the left hip and left thigh.  He took his usual gabapentin this morning, but has not taken anything else for pain.  He denies any head injury, numbness or tingling.     The history is provided by the patient.         Objective:     Past Medical History:    Abdominal hernia    ALCOHOL USE    1-2 times/wk    Arthritis    Back pain    Bronchitis    Chronic left-sided low back pain    Diabetes (HCC)    diet controlled - dx 2/2020    Easy bruising    Elevated lipase    Flatulence/gas pain/belching    Gout    Hernia, abdominal    1973 hernia repair. 2001 double hernia repair    Left knee pain    Obesity    Pacemaker    Pneumonia due to organism    Pulmonary emphysema (HCC)    Wears glasses              Past Surgical History:   Procedure Laterality Date    Cardiac pacemaker placement  2018    Cataract      Colonoscopy  2014    Colonoscopy N/A 10/08/2021    Procedure: COLONOSCOPY with cold snare polypectomy;  Surgeon: Stewart Tolentino MD;  Location:  ENDOSCOPY    Colonoscopy N/A 12/13/2024    Procedure: COLONOSCOPY with cold snare polypectomy;  Surgeon: Stewart Tolentino MD;  Location:  ENDOSCOPY    Dental surgery procedure  2009    dental implants    Hernia surgery Right 1973    Hernia surgery Bilateral 2001    Lasik Bilateral 2003    Other surgical history      Dental Implants 6 years ago    Pacemaker/defibrillator      2017    Repair ing  hernia,5+y/o,reducibl      Repair of nasal septum N/A     Tonsillectomy  's    Vasectomy                  Social History     Socioeconomic History    Marital status:    Tobacco Use    Smoking status: Former     Current packs/day: 0.00     Average packs/day: 1 pack/day for 52.8 years (52.8 ttl pk-yrs)     Types: Cigarettes     Start date: 1965     Quit date: 10/30/2017     Years since quittin.3     Passive exposure: Past (pt's parents both smoked in the home)    Smokeless tobacco: Never    Tobacco comments:     Updated 2/3/25   Vaping Use    Vaping status: Never Used   Substance and Sexual Activity    Alcohol use: Yes     Alcohol/week: 1.0 standard drink of alcohol     Types: 1 Standard drinks or equivalent per week     Comment: 1 drink weekly    Drug use: Never   Other Topics Concern    Caffeine Concern No    Exercise No    Seat Belt No    Special Diet No    Stress Concern No    Weight Concern No     Social Drivers of Health     Food Insecurity: Unknown (9/10/2024)    Food Insecurity     Food Insecurity: Patient declined   Transportation Needs: No Transportation Needs (9/10/2024)    Transportation Needs     Lack of Transportation: No   Housing Stability: Low Risk  (9/10/2024)    Housing Stability     Housing Instability: No                  Physical Exam     ED Triage Vitals [25 1125]   /81   Pulse 53   Resp 18   Temp 97.7 °F (36.5 °C)   Temp src Oral   SpO2 98 %   O2 Device None (Room air)       Current Vitals:   Vital Signs  BP: 101/81  Pulse: 53  Resp: 18  Temp: 97.7 °F (36.5 °C)  Temp src: Oral    Oxygen Therapy  SpO2: 98 %  O2 Device: None (Room air)        Physical Exam  Vitals and nursing note reviewed.   Constitutional:       General: He is not in acute distress.     Appearance: Normal appearance. He is obese. He is not ill-appearing.   HENT:      Head: Normocephalic and atraumatic.      Nose: Nose normal.      Mouth/Throat:      Mouth: Mucous membranes are moist.       Pharynx: Oropharynx is clear.   Eyes:      Conjunctiva/sclera: Conjunctivae normal.   Cardiovascular:      Rate and Rhythm: Normal rate and regular rhythm.      Pulses: Normal pulses.      Heart sounds: Normal heart sounds.   Pulmonary:      Effort: Pulmonary effort is normal. No respiratory distress.      Breath sounds: Normal breath sounds.   Musculoskeletal:         General: Tenderness and signs of injury present. No swelling or deformity.      Comments: Mild tenderness with palpation of the left hip and femur.  No obvious dislocation or deformity.  ROM limited due to pain.  Motor strength and sensation intact.  No pain with palpation of the left knee, lower leg, ankle or foot.  Cap refill 2 sec and DP is 2+.    Skin:     General: Skin is warm and dry.      Capillary Refill: Capillary refill takes less than 2 seconds.   Neurological:      General: No focal deficit present.      Mental Status: He is alert and oriented to person, place, and time.   Psychiatric:         Mood and Affect: Mood normal.         Behavior: Behavior normal.           ED Course   Labs Reviewed - No data to display    ED Course as of 03/03/25 1321  ------------------------------------------------------------  Time: 03/03 1305  Value: XR HIP W OR WO PELVIS 2 OR 3 VIEWS, LEFT (CPT=73502)  Comment: Impression  CONCLUSION:  Severe left hip arthritis.  No acute femoral fracture, subluxation or dislocation.  ------------------------------------------------------------  Time: 03/03 1306  Value: XR FEMUR MIN 2 VIEWS LEFT (CPT=73552)  Comment: Impression  CONCLUSION:  Severe left hip arthritis.  No acute femoral fracture, subluxation or dislocation.          [facility]        Medical Decision Making  75 yo male with fall.  X-ray of left hip and left femur ordered.  Norco ordered for pain.      X-ray of the hip and femur as read by radiology shows severe left hip arthritis.  No femoral fracture, subluxation, or dislocation.  Discussed with patient that he  should continue to take his gabapentin as well as Tylenol, ibuprofen and ice is much as possible over the next few days to help with pain.  Will prescribe a few doses of tramadol to help with more severe pain.  Patient should follow-up with his orthopedist that he is been seeing recently in the next week or 2 for follow-up.  No evidence of fracture, dislocation, or neurovascular compromise.    Amount and/or Complexity of Data Reviewed  Radiology: ordered. Decision-making details documented in ED Course.    Risk  OTC drugs.  Prescription drug management.        Disposition and Plan     Clinical Impression:  1. Hip arthritis    2. Spinal stenosis of lumbar region, unspecified whether neurogenic claudication present         Disposition:  Discharge  3/3/2025  1:09 pm    Follow-up:  Kaveh Acharya DO  [address] [address] [address] [phone]    Call in 1 week(s)            Medications Prescribed:  Current Discharge Medication List        START taking these medications    Details   traMADol 50 MG Oral Tab Take 1 tablet (50 mg total) by mouth every 6 (six) hours as needed for Pain.  Qty: 15 tablet, Refills: 0    Associated Diagnoses: Hip arthritis; Spinal stenosis of lumbar region, unspecified whether neurogenic claudication present                 Supplementary Documentation:

## 2025-03-03 NOTE — DISCHARGE INSTRUCTIONS
Rest and apply ice 20 minutes on and 40 minutes off several times a day.  This will help with inflammation and pain.  Take Tylenol 1000 mg every 6 hours and/or 600 mg of ibuprofen every 6-8 hours as needed for pain.    Take the tramadol as needed to help with more severe pain.  Make sure to use your walker and take your time as this can make you drowsy.  Follow up with your orthopedist Dr. Acharya in 1 week.     Thank you for choosing [facility] for your care.

## 2025-03-04 ENCOUNTER — PATIENT OUTREACH (OUTPATIENT)
Dept: CASE MANAGEMENT | Age: 75
End: 2025-03-04

## 2025-03-04 NOTE — PROGRESS NOTES
TCM has contacted patient and patient needs additional appointments.  Please contact patient for assistance with scheduling a follow-up appointment for  Othopedics , info below. Pt needs appt by 3/10/25.  Thank you!     Kaveh Acharya,   [address] [address] [address] [phone]

## 2025-03-04 NOTE — PROGRESS NOTES
MICHELLE reached out for scheduling assistance.    Kaveh Acharya,   Family Medicine; [place] [address]  [phone]    Attempt #1:  Left message on voicemail for patient to call transitions specialist back to schedule follow up appointments. Provided Transitions specialist scheduling phone number [phone].

## 2025-03-04 NOTE — PROGRESS NOTES
Transitions of Care Navigation  Discharge Date: 3/3/25  Contact Date: 3/4/2025    Transitions of Care Assessment:  MICHELLE Initial Assessment    General:  Assessment completed with: Patient  Patient Subjective: I fell last night again, this time on the right side but there were no additional injuries.  The pain is still quite a bit. Will  the Tramadol this morning.  Chief Complaint: Leg or foot injury  Verify patient name and  with patient/ caregiver: Yes    Hospital Stay/Discharge:  Tell me what you understand of why you were in the hospital or emergency department: fell on left side  Prior to leaving the hospital were your Discharge Instructions reviewed with you?: Yes  Did you receive a copy of your written Discharge Instructions?: Yes  What questions do you have about your Discharge Instructions?: none  Do you feel better or worse since you left the hospital or emergency department?: Same    Follow - Up Appointment:  Do you have a follow-up appointment?: No  Are there any barriers to getting to your follow-up appointment?: No    Home Health/DME:  Prior to leaving the hospital was Home Health (HH) arranged for you?: No     Prior to leaving the hospital or emergency department was Durable Medical Equipment (DME), medical supplies, or infusions arranged for you?: No  Are DME/medical supply/infusions needs identified by staff during this assessment?: No     Medications/Diet:  Did any of your medications change, during or after your hospital stay or ED visit?: Yes  Do you have your new or updated medications?: No (Pt states that he will  today)  Do you understand what your medications are for and possible side effects?: Yes  Are there any reasons that keep you from taking your medication as prescribed?: No    Were you given a different diet per your Discharge Instructions?: No     Questions/Concerns:  Do you have any questions or concerns that have not been discussed?: No       Nursing Interventions: NCM  reviewed discharge instructions and when to seek medical attention with the patient. He sates that he fell again last night when getting out of bed. He states that he fell on his right side but there were no injuries. He denies having any LOC or dizziness prior to fall. He states that the pain he has is still on the left thigh and hip. He rates it 6/10 and states that he has not yet picked up the Tramadol but will this morning. He denied having any fever, n/v/c/d, lightheadedness, HA or any other new or worsening symptoms. Med review completed. He denied having any questions or concerns for PCP at this time.     Medications:  Medication Reconciliation:  I am aware of an inpatient discharge within the last 30 days.  The discharge medication list has been reconciled with the patient's current medication list and reviewed by me. See medication list for additions of new medication, and changes to current doses of medications and discontinued medications.  Current Outpatient Medications   Medication Sig Dispense Refill    traMADol 50 MG Oral Tab Take 1 tablet (50 mg total) by mouth every 6 (six) hours as needed for Pain. 15 tablet 0    gabapentin 600 MG Oral Tab Take 1 tablet (600 mg total) by mouth 3 (three) times daily. 90 tablet 0    ipratropium 0.06 % Nasal Solution       rosuvastatin 10 MG Oral Tab TAKE 1 TABLET(10 MG) BY MOUTH EVERY NIGHT 90 tablet 3    metFORMIN 500 MG Oral Tab TAKE 1 TABLET(500 MG) BY MOUTH DAILY WITH BREAKFAST 90 tablet 3    fluticasone-umeclidin-vilant (TRELEGY ELLIPTA) 100-62.5-25 MCG/ACT Inhalation Aerosol Powder, Breath Activated INHALE 1 PUFF INTO THE LUNGS DAILY (Patient taking differently: Inhale 1 puff into the lungs daily. 2/12/25-not using currently) 180 each 3    magnesium oxide 400 MG Oral Tab Take 1 tablet (400 mg total) by mouth daily.      aspirin 81 MG Oral Tab EC Take 1 tablet (81 mg total) by mouth daily. 90 tablet 3    famotidine 20 MG Oral Tab Take 1 tablet (20 mg total) by  mouth 2 (two) times daily as needed for Heartburn. 30 tablet 0    brimonidine 0.2 % Ophthalmic Solution Place 1 drop into the right eye Q12H.      Misc Natural Products (GLUCOSAMINE CHONDROITIN ADV) Oral Tab Take 1 tablet by mouth daily.      latanoprost 0.005 % Ophthalmic Solution Place 1 drop into both eyes nightly.      Multiple Vitamins-Minerals (PRESERVISION AREDS 2) Oral Cap take two tablets by mouth daily         Follow-up Appointments:  Your appointments       Date & Time Appointment Department ([place])    Mar 20, 2025 9:30 AM CDT New Patient Visit with Shade Moffett DO [address] ([place] [address])    Please arrive 15 minutes prior to your scheduled appointment. Please also bring your Insurance card, Photo ID, and your medication bottles or a list of your current medication.    If your condition improves and this appointment is no longer needed, please contact your physician office to cancel.    Please verify with your primary care provider if your insurance requires a referral.        Jun 11, 2025 8:00 AM CDT Exam - Established with Staci Miguel MD [facility] ([facility])        Oct 14, 2025 8:20 AM CDT Exam - Established with Chase Stokes MD [facility] ([place] [address]  [address] [place] [address]  [phone] [facility]  [address]  [facility] [address]  [phone] [facility]  [address]  [facility] [id number] [phone]            Transitional Care Clinic  Was TCC Ordered: No      Primary Care Provider (If no TCC  appointment)  Does patient already have a PCP appointment scheduled? No, pt states that he will follow up with Ortho    Specialist  Does the patient have any other follow-up appointment(s) need to be scheduled? Yes   -If yes: Nurse Care Manager reviewed upcoming specialist appointments with patient: Yes   -Does the patient need assistance scheduling appointment(s): Yes, message to TST team, MARY sent TE to TST to schedule with Ortho. Pt aware that he will receive a return call.     []  Patient verbally agrees to additional follow-up calls from Nurse Care Manager    Book By Date: 3/10/25

## 2025-03-05 NOTE — PROGRESS NOTES
MICHELLE reached out for scheduling assistance.    Kaveh Acharya,   Family Medicine; [place] [address]  [phone]    Attempt #2:  Left message on voicemail for patient to call transitions specialist back to schedule follow up appointments. Provided Transitions specialist scheduling phone number [phone].

## 2025-03-06 NOTE — PROGRESS NOTES
MICHELLE reached out for scheduling assistance.    Kaveh Acharya,   Family Medicine; [place] [address]  [phone]    Attempt #3:  Left message on voicemail for patient to call transitions specialist back to schedule follow up appointments. Provided Transitions specialist scheduling phone number [phone]. Closing encounter. Will re-open if patient returns call.

## 2025-03-08 ENCOUNTER — TELEPHONE (OUTPATIENT)
Dept: INTERNAL MEDICINE CLINIC | Facility: CLINIC | Age: 75
End: 2025-03-08

## 2025-03-08 NOTE — TELEPHONE ENCOUNTER
Received call from patient's son Miguel Angel.  Patient admitted at [facility] currently.  Patient collapsed at home.  Currently inpatient at [facility], being worked up for this weakness/current symptoms.  Son states he and sister now have HCPOA for father.  Family prefers patient to be transferred to [facility].  I advised patient this would really be at the discretion of the hospitalist.  Patient does have cardiologist/EP out of [facility] at [facility].  Advised patient's son that generally transfers would be facilitated if there is a specialist or procedure that is available at [facility] that is not available at current hospital.

## 2025-03-20 ENCOUNTER — TELEPHONE (OUTPATIENT)
Dept: PHYSICAL MEDICINE AND REHAB | Facility: CLINIC | Age: 75
End: 2025-03-20

## 2025-03-20 ENCOUNTER — OFFICE VISIT (OUTPATIENT)
Dept: PHYSICAL MEDICINE AND REHAB | Facility: CLINIC | Age: 75
End: 2025-03-20
Payer: MEDICARE

## 2025-03-20 VITALS — HEIGHT: 67 IN | WEIGHT: 200 LBS | BODY MASS INDEX: 31.39 KG/M2

## 2025-03-20 DIAGNOSIS — M16.12 PRIMARY OSTEOARTHRITIS OF LEFT HIP: ICD-10-CM

## 2025-03-20 DIAGNOSIS — M48.062 SPINAL STENOSIS OF LUMBAR REGION WITH NEUROGENIC CLAUDICATION: Primary | ICD-10-CM

## 2025-03-20 PROCEDURE — 99204 OFFICE O/P NEW MOD 45 MIN: CPT | Performed by: PHYSICAL MEDICINE & REHABILITATION

## 2025-03-20 NOTE — TELEPHONE ENCOUNTER
Per CMS Guidelines -no authorization is required for L4/5 ILESI with fluoroscopic guidance.  CPT code: 77156       Status: Authorization is not required based on medical necessity however is not a guarantee of payment and may be subject to review once claim is submitted.

## 2025-03-20 NOTE — TELEPHONE ENCOUNTER
Patient has been scheduled for L4/5 Interlaminar epidural steroid injections on 4/4/2025 at [facility] with Dr. Moffett.   Anesthesia type:  Local  Arrival time: 1030am & Procedure time: 1130am  Patient was advised that if he/she does receive the covid vaccine it needs to be at least 2 weeks before or after the injection.  Medications and allergies reviewed.  Educated to hold NSAIDS (Aleve, Ibuprofen, Motrin, Advil) and anti-inflammatories (Meloxicam, Naproxen, Diclofenac, Celebrex)  and for cervical injections must hold Multi-Vitamins, Vitamin E, Fish Oil/Omega-3 for 3 days prior to procedure.  Patient informed of [facility]'s  policy:  he/she will need a  to and from procedure.   [facility]  Address: [address] [place], [place] [address] [place] [address].  Patient verbalized understanding and agrees with plan.  Scheduled in Epic: Yes  Scheduled in Surgical Case: Yes  Follow up appointment made: NOV: Visit date not found

## 2025-03-20 NOTE — PROGRESS NOTES
NEW PATIENT VISIT    CHIEF COMPLAINT  Low back pain      HISTORY OF PRESENTING ILLNESS    Nasir Beauchamp is a 74 year old male who presents for evaluation of low back pain.  Patient is referred to my office through orthopedic sports medicine Dr. Acharya with complaints of left-sided low back and lateral left hip joint pain with radiation and neurogenic claudication type symptoms.  Endorses weakness in the legs with prolonged ambulation, does have a CT myelogram on file which is reviewed in full with does show moderate central canal narrowing.  There was question of possible lumbar intervention to address some of the neurogenic claudication type symptoms.  Currently uses gabapentin 600 mg 3 times daily.    History of Present Illness  Nasir Beauchamp is a 74 year old male with spinal stenosis and joint arthritis who presents with back and hip pain. He is accompanied by his wife, Annia, and his daughter, Raisa. He was referred by Dr. Acharya for evaluation of his back and hip pain.    His symptoms began last summer with lower back pain radiating to the left hip and groin. A CT scan revealed spinal stenosis and joint arthritis. A hip injection provided no relief. He experiences difficulty with daily activities, such as putting on socks, and relies on his wife for assistance. He is unable to walk long distances without support and feels unsteady on his feet, describing his legs as 'wobbly'. He uses a walker for mobility and prefers a type that allows him to move quickly and sit when needed. He feels unsteady when standing without support.    He recently underwent a hospital stay following a fall from bed, during which bleeding ulcers were cauterized. He was discharged to a rehabilitation facility but felt the therapy was inadequate. He has a history of falls, including a recent incident involving a rug in his bathroom, which has since been removed. He has fallen behind on his home exercise program.    His mother had spinal  stenosis.       PAST MEDICAL HISTORY  Past Medical History:    Abdominal hernia    ALCOHOL USE    1-2 times/wk    Arthritis    Back pain    Bronchitis    Chronic left-sided low back pain    Diabetes (HCC)    diet controlled - dx 2/2020    Easy bruising    Elevated lipase    Flatulence/gas pain/belching    Gout    Hernia, abdominal    1973 hernia repair. 2001 double hernia repair    Left knee pain    Obesity    Pacemaker    Pneumonia due to organism    Pulmonary emphysema (HCC)    Wears glasses       PAST SURGICAL HISTORY  Past Surgical History:   Procedure Laterality Date    Cardiac pacemaker placement  2018    Cataract      Colonoscopy  2014    Colonoscopy N/A 10/08/2021    Procedure: COLONOSCOPY with cold snare polypectomy;  Surgeon: Stewart Tolentino MD;  Location:  ENDOSCOPY    Colonoscopy N/A 12/13/2024    Procedure: COLONOSCOPY with cold snare polypectomy;  Surgeon: Stewart Tolentino MD;  Location:  ENDOSCOPY    Dental surgery procedure  2009    dental implants    Hernia surgery Right 1973    Hernia surgery Bilateral 2001    Lasik Bilateral 2003    Other surgical history      Dental Implants 6 years ago    Pacemaker/defibrillator      2017    Repair ing hernia,5+y/o,reducibl      Repair of nasal septum N/A 2003    Tonsillectomy  1950's    Vasectomy  1978       MEDICATIONS  Medications Ordered Prior to Encounter[1]    ALLERGIES  Allergies[2]    SOCIAL HISTORY   reports that he quit smoking about 7 years ago. His smoking use included cigarettes. He started smoking about 60 years ago. He has a 52.8 pack-year smoking history. He has been exposed to tobacco smoke. He has never used smokeless tobacco. He reports current alcohol use of about 1.0 standard drink of alcohol per week. He reports that he does not use drugs.    FAMILY HISTORY  Family History   Problem Relation Age of Onset    Other (Other) Father         dementia    Other (Other) Mother         COPD    Asthma Mother     Pulmonary Disease Mother         REVIEW OF SYSTEMS  Complete review of systems was performed and was negative except for those items stated in the History of Presenting Illness and Past Medical/Surgical History.    PHYSICAL EXAMINATION  GENERAL:  In no acute distress. Well-developed and well nourished.   SKIN: No rashes or open wounds involving posterior torso, posterior pelvis, lower extremities.  NEUROLOGIC:   Strength: 5/5 bilaterally with hip flexion, knee extension, knee flexion, ankle dorsiflexion, ankle eversion, ankle inversion, ankle plantarflexion, and great toe extension.   Sensation: intact light touch sensation throughout both lower extremities.   Reflexes: intact and symmetric in bilateral lower extremities. Babinski downgoing bilaterally. No clonus.   Gait: Unsteady gait requiring handhold and use of rollator.    MUSCULOSKELETAL:  Physical Exam  MUSCULOSKELETAL: Twinge in groin area and pain in groin and buttock with hip movement on the left side. Leg extension normal.          REVIEW OF PRIOR X-RAYS/STUDIES  CT Myelogram Lumbar Spine (2/18/25): Reviewed during today's visit. Findings show mild to moderate multilevel degenerative changes, most pronounced at L3-4 and L4-5, where mild to moderate central canal stenosis and subarticular stenosis are present. There is also mild right neural foraminal stenosis at L3-4 and mild bilateral neural foraminal stenosis at L4-5. No acute fracture or significant spondylolisthesis.    Left Hip X-rays (10/21/24): Moderate joint space narrowing, subchondral sclerosis, and possible subchondral cysts at the femoral head-neck junction, consistent with Kellgren-Juan grade 3 osteoarthritis.     Lumbar Spine X-rays (4/19/24): Degenerative levoscoliosis, mild posterior subluxation of L3 on L4, anterior subluxation of L4 on L5, disc space narrowing at L1-2 and L3-4, facet arthropathy, and aortic calcifications. No acute abnormalities.    IMPRESSION/DIAGNOSIS  Encounter Diagnoses   Name Primary?     Spinal stenosis of lumbar region with neurogenic claudication Yes    Primary osteoarthritis of left hip        TREATMENT/PLAN  Assessment & Plan  Lumbar Spinal Stenosis  Chronic lumbar spinal stenosis with joint arthritis and nerve narrowing causing back and hip pain, unsteadiness, and ambulation difficulty. Differential includes hip pathology versus lumbar spine contribution. Discussed lumbar epidural steroid injection to assess stenosis contribution to symptoms.  - Order lumbar epidural steroid injection.  - Refer to outpatient physical therapy for gait and balance training.  - Schedule injection at [facility].    Left Hip Pain  Chronic left hip pain with limited range of motion and radiating pain. Previous ineffective hip injection suggests possible non-hip origin. Differential includes hip joint pathology versus referred pain from lumbar spine. Discussed hip replacement as last option after lumbar intervention evaluation.  - Consider repeat hip injection if lumbar intervention is ineffective.  - Evaluate hip function post-lumbar intervention before considering hip replacement.    Gastric Ulcers  Cauterized gastric ulcers with no current symptoms or anemia. Previous hospitalization for ulcers with altered mental status, possibly medication-related.  - Monitor for recurrence of gastric symptoms.  - Ensure follow-up with primary care.    Rehabilitation and Mobility  Recent discharge from rehabilitation with ongoing unsteadiness and falls. No post-discharge rehab plan provided. Emphasized importance of outpatient therapy for strength, balance, and gait improvement.  - Arrange outpatient physical therapy at [facility].  - Encourage adherence to home exercise program.      Education was provided regarding the above impression/diagnosis and treatment options/plan were discussed.  All questions were answered during today's visit.  Patient will contact clinic if any other questions or  concerns.            Shade Moffett DO  Interventional Spine and Sports Medicine Specialist   Physical Medicine and Rehabilitation  [facility]  [address] [place] [id number]    [facility]  [address] [place] [address]                 [1]   Current Outpatient Medications on File Prior to Visit   Medication Sig Dispense Refill    gabapentin 600 MG Oral Tab Take 1 tablet (600 mg total) by mouth 3 (three) times daily. 90 tablet 0    ipratropium 0.06 % Nasal Solution       rosuvastatin 10 MG Oral Tab TAKE 1 TABLET(10 MG) BY MOUTH EVERY NIGHT 90 tablet 3    metFORMIN 500 MG Oral Tab TAKE 1 TABLET(500 MG) BY MOUTH DAILY WITH BREAKFAST 90 tablet 3    fluticasone-umeclidin-vilant (TRELEGY ELLIPTA) 100-62.5-25 MCG/ACT Inhalation Aerosol Powder, Breath Activated INHALE 1 PUFF INTO THE LUNGS DAILY (Patient taking differently: Inhale 1 puff into the lungs daily. 2/12/25-not using currently) 180 each 3    magnesium oxide 400 MG Oral Tab Take 1 tablet (400 mg total) by mouth daily.      aspirin 81 MG Oral Tab EC Take 1 tablet (81 mg total) by mouth daily. 90 tablet 3    famotidine 20 MG Oral Tab Take 1 tablet (20 mg total) by mouth 2 (two) times daily as needed for Heartburn. 30 tablet 0    brimonidine 0.2 % Ophthalmic Solution Place 1 drop into the right eye Q12H.      Misc Natural Products (GLUCOSAMINE CHONDROITIN ADV) Oral Tab Take 1 tablet by mouth daily.      latanoprost 0.005 % Ophthalmic Solution Place 1 drop into both eyes nightly.      Multiple Vitamins-Minerals (PRESERVISION AREDS 2) Oral Cap take two tablets by mouth daily       No current facility-administered medications on file prior to visit.   [2] No Known Allergies

## 2025-03-24 ENCOUNTER — TELEPHONE (OUTPATIENT)
Dept: PHYSICAL THERAPY | Age: 75
End: 2025-03-24

## 2025-04-04 ENCOUNTER — HOSPITAL ENCOUNTER (OUTPATIENT)
Facility: HOSPITAL | Age: 75
Setting detail: HOSPITAL OUTPATIENT SURGERY
Discharge: HOME | End: 2025-04-04
Attending: PHYSICAL MEDICINE & REHABILITATION | Admitting: PHYSICAL MEDICINE & REHABILITATION
Payer: MEDICARE

## 2025-04-04 ENCOUNTER — APPOINTMENT (OUTPATIENT)
Dept: GENERAL RADIOLOGY | Facility: HOSPITAL | Age: 75
End: 2025-04-04
Attending: PHYSICAL MEDICINE & REHABILITATION
Payer: MEDICARE

## 2025-04-04 VITALS
BODY MASS INDEX: 31.39 KG/M2 | RESPIRATION RATE: 18 BRPM | TEMPERATURE: 98 F | SYSTOLIC BLOOD PRESSURE: 143 MMHG | HEART RATE: 72 BPM | OXYGEN SATURATION: 100 % | WEIGHT: 200 LBS | HEIGHT: 67 IN | DIASTOLIC BLOOD PRESSURE: 75 MMHG

## 2025-04-04 PROBLEM — M48.062 SPINAL STENOSIS OF LUMBAR REGION WITH NEUROGENIC CLAUDICATION: Status: ACTIVE | Noted: 2025-04-04

## 2025-04-04 LAB — GLUCOSE BLD-MCNC: 98 MG/DL (ref 70–99)

## 2025-04-04 PROCEDURE — B01B1ZZ FLUOROSCOPY OF SPINAL CORD USING LOW OSMOLAR CONTRAST: ICD-10-PCS | Performed by: PHYSICAL MEDICINE & REHABILITATION

## 2025-04-04 PROCEDURE — 62323 NJX INTERLAMINAR LMBR/SAC: CPT | Performed by: PHYSICAL MEDICINE & REHABILITATION

## 2025-04-04 PROCEDURE — 3E0R3BZ INTRODUCTION OF ANESTHETIC AGENT INTO SPINAL CANAL, PERCUTANEOUS APPROACH: ICD-10-PCS | Performed by: PHYSICAL MEDICINE & REHABILITATION

## 2025-04-04 PROCEDURE — 3E0R33Z INTRODUCTION OF ANTI-INFLAMMATORY INTO SPINAL CANAL, PERCUTANEOUS APPROACH: ICD-10-PCS | Performed by: PHYSICAL MEDICINE & REHABILITATION

## 2025-04-04 RX ORDER — NALOXONE HYDROCHLORIDE 0.4 MG/ML
0.08 INJECTION, SOLUTION INTRAMUSCULAR; INTRAVENOUS; SUBCUTANEOUS AS NEEDED
Status: DISCONTINUED | OUTPATIENT
Start: 2025-04-04 | End: 2025-04-04

## 2025-04-04 RX ORDER — BETAMETHASONE SODIUM PHOSPHATE AND BETAMETHASONE ACETATE 3; 3 MG/ML; MG/ML
INJECTION, SUSPENSION INTRA-ARTICULAR; INTRALESIONAL; INTRAMUSCULAR; SOFT TISSUE
Status: DISCONTINUED | OUTPATIENT
Start: 2025-04-04 | End: 2025-04-04

## 2025-04-04 RX ORDER — LIDOCAINE HYDROCHLORIDE 10 MG/ML
INJECTION, SOLUTION EPIDURAL; INFILTRATION; INTRACAUDAL; PERINEURAL
Status: DISCONTINUED | OUTPATIENT
Start: 2025-04-04 | End: 2025-04-04

## 2025-04-04 NOTE — H&P
[facility]   part of [facility]    History & Physical    Nasir Beauchamp Patient Status:  Hospital Outpatient Surgery    4/15/1950 MRN [id number]   Location [facility] Attending Shade Moffett,    Hosp Day # 0 PCP Staci Miguel MD     Date of Admission:  2025    History of Present Illness:  Nasir Beauchamp is a(n) 74 year old male. Patient with low back and leg pain here for lumbar intervention.     History:  Past Medical History:    Abdominal hernia    ALCOHOL USE    1-2 times/wk    Arthritis    Back pain    Bronchitis    Chronic left-sided low back pain    Diabetes (HCC)    diet controlled - dx 2020    Easy bruising    Elevated lipase    Flatulence/gas pain/belching    Gout    Hernia, abdominal    1973 hernia repair.  double hernia repair    Left knee pain    Obesity    Pacemaker    Pneumonia due to organism    Pulmonary emphysema (HCC)    Wears glasses     Past Surgical History:   Procedure Laterality Date    Cardiac pacemaker placement  2018    Cataract      Colonoscopy      Colonoscopy N/A 10/08/2021    Procedure: COLONOSCOPY with cold snare polypectomy;  Surgeon: Stewart Tolentino MD;  Location:  ENDOSCOPY    Colonoscopy N/A 2024    Procedure: COLONOSCOPY with cold snare polypectomy;  Surgeon: Stewart Tolentino MD;  Location:  ENDOSCOPY    Dental surgery procedure  2009    dental implants    Hernia surgery Right 1973    Hernia surgery Bilateral     Lasik Bilateral     Other surgical history      Dental Implants 6 years ago    Pacemaker/defibrillator      2017    Repair ing hernia,5+y/o,reducibl      Repair of nasal septum N/A     Tonsillectomy  1950's    Vasectomy       Family History   Problem Relation Age of Onset    Other (Other) Father         dementia    Other (Other) Mother         COPD    Asthma Mother     Pulmonary Disease Mother       reports that he quit smoking about 7 years ago. His smoking use included cigarettes. He  started smoking about 60 years ago. He has a 52.8 pack-year smoking history. He has been exposed to tobacco smoke. He has never used smokeless tobacco. He reports current alcohol use of about 1.0 standard drink of alcohol per week. He reports that he does not use drugs.    Allergies:  Allergies[1]    Home Medications:  Prescriptions Prior to Admission[2]    Physical Exam:   General: Alert, orientated x3.  Cooperative.  No apparent distress.  Vital Signs:  Blood pressure 116/64, pulse 76, temperature 97.1 °F (36.2 °C), temperature source Temporal, resp. rate 16, height 67\", weight 200 lb (90.7 kg), SpO2 98%.  HEENT: Exam is unremarkable.  Pupils are equal and round.    Lungs: no labored breathing.  Cardiac: Regular rate and rhythm.  Abdomen:  Soft, non-distended  Extremities:  No lower extremity edema noted.    Skin: Normal texture and turgor.  Neurologic: No new MSK or focal neurodeficits.     Impression and Plan:  Patient Active Problem List   Diagnosis    Rotator cuff syndrome of left shoulder    History of smoking 25-50 pack years    Anemia    Vertigo    Carotid disease, bilateral    Meniere's disease    Presence of cardiac pacemaker    PVD (peripheral vascular disease)    RBBB (right bundle branch block with left anterior fascicular block)    Type 2 diabetes mellitus without complication, without long-term current use of insulin ([facility])    Left knee pain    Easy bruising    Chronic left-sided low back pain    Arthritis    Abdominal hernia    Right inguinal hernia    Dyspnea on exertion    Chest pain, atypical    Spinal stenosis of lumbar region with neurogenic claudication       Plan for L4/5 interlaminar epidural steroid injection, fluoroscopic guidance, local anesthesia.     Patient will follow up with me in about 3 weeks.     Shade Moffett DO  4/4/2025  11:16 AM         [1] No Known Allergies  [2]   Medications Prior to Admission   Medication Sig Dispense Refill Last Dose/Taking    metFORMIN 500 MG Oral Tab  TAKE 1 TABLET(500 MG) BY MOUTH DAILY WITH BREAKFAST 90 tablet 3 4/3/2025 at  6:00 AM    aspirin 81 MG Oral Tab EC Take 1 tablet (81 mg total) by mouth daily. 90 tablet 3 4/3/2025 at  6:00 AM    Misc Natural Products (GLUCOSAMINE CHONDROITIN ADV) Oral Tab Take 1 tablet by mouth daily.   4/3/2025 at  6:00 AM    Multiple Vitamins-Minerals (PRESERVISION AREDS 2) Oral Cap take two tablets by mouth daily   4/3/2025 at  6:00 AM    [] traMADol 50 MG Oral Tab Take 1 tablet (50 mg total) by mouth every 6 (six) hours as needed for Pain. 15 tablet 0     [] gabapentin 600 MG Oral Tab Take 1 tablet (600 mg total) by mouth 3 (three) times daily. 90 tablet 0     ipratropium 0.06 % Nasal Solution        rosuvastatin 10 MG Oral Tab TAKE 1 TABLET(10 MG) BY MOUTH EVERY NIGHT 90 tablet 3     fluticasone-umeclidin-vilant (TRELEGY ELLIPTA) 100-62.5-25 MCG/ACT Inhalation Aerosol Powder, Breath Activated INHALE 1 PUFF INTO THE LUNGS DAILY (Patient taking differently: Inhale 1 puff into the lungs daily. 25-not using currently) 180 each 3     magnesium oxide 400 MG Oral Tab Take 1 tablet (400 mg total) by mouth daily.       famotidine 20 MG Oral Tab Take 1 tablet (20 mg total) by mouth 2 (two) times daily as needed for Heartburn. 30 tablet 0     brimonidine 0.2 % Ophthalmic Solution Place 1 drop into the right eye Q12H.       latanoprost 0.005 % Ophthalmic Solution Place 1 drop into both eyes nightly.

## 2025-04-04 NOTE — DISCHARGE INSTRUCTIONS
Home Care Instructions Following Your Pain Procedure     Nasir,  It has been a pleasure to have you as our patient. To help you at home, you must follow these general discharge instructions. We will review these with you before you are discharged. It is our hope that you have a complete and uneventful recovery from our procedure.     General Instructions:  What to Expect:  Bandages from your procedure today can be removed when you get home.  Please avoid soaking and/or swimming for 24 hours.  Showering is okay  It is normal to have increased pain symptoms and/or pain at injection site for up to 3-5 days after procedure, you can use heat or ice (20 minutes on 20 minutes off) for comfort.  You may experience some temporary side effects which may include restlessness or insomnia, flushing of the face, or heart palpitations.  Please contact the provider if these symptoms do not resolve within 3-4 days.  Lightheadedness or nausea may occur and should resolve within 24 to 48 hours.  If you develop a headache after treatment, rest, drink fluids (with caffeine, if possible) and take mild over-the-counter pain medication.  If the headache does not improve with the above treatment, contact the physician.  Home Medications:  Resume all previously prescribed medication.  Please avoid taking NSAIDs (Non-Steriodal Anti-Inflammatory Drugs) such as:  Ibuprofen ( Advil, Motrin) Aleve (Naproxen), Diclofenac, Meloxicam for 6 hours after procedure.   If you are on Coumadin (Warfarin) or any other anti-coagulant (or \"blood thinning\") medication such as Plavix (Clopidogrel), Xarelto (Rivaroxaban), Eliquis (Apixaban), Effient (Prasugrel) etc., restart on the following day from the procedure unless otherwise directed by your provider.  If you are a diabetic, please increase the frequency of your glucose monitoring after the procedure as steroids may cause a temporary (2-3 day) increase in your blood sugar.  Contact your primary care  physician if your blood sugar remains elevated as you may require some medication adjustment.  Diet:  Resume your regular diet as tolerated.  Activity:  We recommend that you relax and rest during the rest of your procedure day.  If you feel weakness in your arms or legs do not drive.  Follow-up Appointment  Please schedule a follow-up visit within 3 to 4 weeks after your last procedure date.  Question or Concerns:  Feel free to call our office with any questions or concerns at [phone] (option #2)    Nasir  Thank you for coming to [facility] for your procedure.  The nurses try very hard to make sure you receive the best care possible.  Your trust in them as well as us is greatly appreciated.    Thanks so much,   Dr. Khoa Tipton

## 2025-04-04 NOTE — OPERATIVE REPORT
[facility]    LUMBAR INTERLAMINAR  NAME:  Nasir Beauchamp    MR #:    [id number] :  4/15/1950     PHYSICIAN:  Shade Moffett DO        Operative Report    DATE OF PROCEDURE: 2025   PREOPERATIVE DIAGNOSES: Spinal stenosis of lumbar region with neurogenic claudication [M48.062]   POSTOPERATIVE DIAGNOSES:   Spinal stenosis     PROCEDURES: L4-5 interlaminar epidural steroid injection done under fluoroscopic guidance with contrast enhancement.   SURGEON: Shade Moffett DO   ANESTHESIA: Local   INDICATIONS: Low back and leg pain       OPERATIVE PROCEDURE:  Written consent was obtained from the patient.  The patient was brought into the operating room and placed in the prone position on the fluoroscopy table with pillow underneath the chest and shoulders.  The patient's skin was cleaned and draped in a normal sterile fashion.  Using AP fluoroscopy, all 5 lumbar vertebrae were identified.  The L4/5 interlaminar space was identified.  Skin was anesthetized with 1% PF lidocaine without epinephrine.  Then, a 3-1/2 inch, 18-gauge Tuohy needle was inserted and directed towards the paracentral left L4/5 interlaminar space, then using contralateral oblique fluoroscopy and loss of resistance technique, the epidural space was entered.  Then, Omnipaque-240 contrast was used to obtain a good epidurogram indicating correct needle placement.  Then, aspiration was performed.  No blood, fluid, or air was aspirated.  Then, the patient was injected with a 4 cc solution of  2 cc of 1% PF lidocaine without epinephrine, and 2 cc of 6 mg/cc of Celestone Soluspan.  Then, the needle was removed.  The patient's skin was cleaned.  A Band-Aid was applied.  The patient was transferred to the cart and into [facility].  The patient was given discharge instructions and will follow up in the clinic as scheduled.  Throughout the whole procedure, the patient's pulse oximetry and vital signs were monitored and they remained completely  stable.  Also, throughout the whole procedure, prior to injection of any medication, aspiration was performed.  No blood, fluid, or air was aspirated at anytime.      Shade Moffett DO  Interventional Spine and Sports Medicine Specialist   Physical Medicine and Rehabilitation  [place] [id number] [place] [id number]    [facility]  [address] [place] [address]

## 2025-04-07 ENCOUNTER — OFFICE VISIT (OUTPATIENT)
Dept: INTERNAL MEDICINE CLINIC | Facility: CLINIC | Age: 75
End: 2025-04-07
Payer: MEDICARE

## 2025-04-07 ENCOUNTER — LAB ENCOUNTER (OUTPATIENT)
Dept: LAB | Age: 75
End: 2025-04-07
Attending: INTERNAL MEDICINE
Payer: MEDICARE

## 2025-04-07 VITALS
BODY MASS INDEX: 31.39 KG/M2 | HEIGHT: 67 IN | SYSTOLIC BLOOD PRESSURE: 128 MMHG | WEIGHT: 200 LBS | OXYGEN SATURATION: 99 % | DIASTOLIC BLOOD PRESSURE: 76 MMHG | HEART RATE: 70 BPM

## 2025-04-07 DIAGNOSIS — D64.9 ANEMIA, UNSPECIFIED TYPE: ICD-10-CM

## 2025-04-07 DIAGNOSIS — R41.3 MEMORY LOSS: ICD-10-CM

## 2025-04-07 DIAGNOSIS — K26.9 DUODENAL ULCER: ICD-10-CM

## 2025-04-07 DIAGNOSIS — R60.0 LOWER EXTREMITY EDEMA: ICD-10-CM

## 2025-04-07 DIAGNOSIS — Z09 HOSPITAL DISCHARGE FOLLOW-UP: Primary | ICD-10-CM

## 2025-04-07 PROBLEM — E11.9 TYPE 2 DIABETES MELLITUS WITHOUT COMPLICATION, WITHOUT LONG-TERM CURRENT USE OF INSULIN (HCC): Status: RESOLVED | Noted: 2020-02-13 | Resolved: 2025-04-07

## 2025-04-07 LAB
BASOPHILS # BLD AUTO: 0.06 X10(3) UL (ref 0–0.2)
BASOPHILS NFR BLD AUTO: 0.5 %
DEPRECATED HBV CORE AB SER IA-ACNC: 51 NG/ML
EOSINOPHIL # BLD AUTO: 0.1 X10(3) UL (ref 0–0.7)
EOSINOPHIL NFR BLD AUTO: 0.9 %
ERYTHROCYTE [DISTWIDTH] IN BLOOD BY AUTOMATED COUNT: 17.6 %
HCT VFR BLD AUTO: 35.1 %
HGB BLD-MCNC: 10.9 G/DL
IMM GRANULOCYTES # BLD AUTO: 0.09 X10(3) UL (ref 0–1)
IMM GRANULOCYTES NFR BLD: 0.8 %
IRON SATN MFR SERPL: 26 %
IRON SERPL-MCNC: 78 UG/DL
LYMPHOCYTES # BLD AUTO: 3.21 X10(3) UL (ref 1–4)
LYMPHOCYTES NFR BLD AUTO: 28.1 %
MCH RBC QN AUTO: 27.3 PG (ref 26–34)
MCHC RBC AUTO-ENTMCNC: 31.1 G/DL (ref 31–37)
MCV RBC AUTO: 88 FL
MONOCYTES # BLD AUTO: 1.14 X10(3) UL (ref 0.1–1)
MONOCYTES NFR BLD AUTO: 10 %
NEUTROPHILS # BLD AUTO: 6.81 X10 (3) UL (ref 1.5–7.7)
NEUTROPHILS # BLD AUTO: 6.81 X10(3) UL (ref 1.5–7.7)
NEUTROPHILS NFR BLD AUTO: 59.7 %
PLATELET # BLD AUTO: 276 10(3)UL (ref 150–450)
RBC # BLD AUTO: 3.99 X10(6)UL
TOTAL IRON BINDING CAPACITY: 304 UG/DL (ref 250–425)
TRANSFERRIN SERPL-MCNC: 220 MG/DL (ref 215–365)
TSI SER-ACNC: 3.31 UIU/ML (ref 0.55–4.78)
VIT B12 SERPL-MCNC: 319 PG/ML (ref 211–911)
WBC # BLD AUTO: 11.4 X10(3) UL (ref 4–11)

## 2025-04-07 PROCEDURE — 84443 ASSAY THYROID STIM HORMONE: CPT

## 2025-04-07 PROCEDURE — 83550 IRON BINDING TEST: CPT

## 2025-04-07 PROCEDURE — 83540 ASSAY OF IRON: CPT

## 2025-04-07 PROCEDURE — 82728 ASSAY OF FERRITIN: CPT

## 2025-04-07 PROCEDURE — 85025 COMPLETE CBC W/AUTO DIFF WBC: CPT

## 2025-04-07 PROCEDURE — 36415 COLL VENOUS BLD VENIPUNCTURE: CPT

## 2025-04-07 PROCEDURE — 99214 OFFICE O/P EST MOD 30 MIN: CPT | Performed by: INTERNAL MEDICINE

## 2025-04-07 PROCEDURE — 82607 VITAMIN B-12: CPT

## 2025-04-07 NOTE — PROGRESS NOTES
[facility] Internal Medicine Office Note  Chief Complaint:   Chief Complaint   Patient presents with    Hospital F/U     Pt was in the hospital in March and does not remember what had happened when he was in there. Pt does not remember the fall or the time up to leaving.        HPI:   This is a 74 year old male coming in for hospital follow up   HPI      He fell at home getting out of bed. He does not think he hit his head. He is most bothered by he does not remember going to the hospital and does not remember anything about the hospital stay.     Per [facility] care everywhere, he was admitted with dehydration after a fall.   He notes he fell a few days prior to that as well when he was taking his dog out.   He spent the night sleeping on the floor in the dog bed next to the bed and his wife and daughter could not get him up back into bed so they called an ambulance.     Hgb 8.8 and fecal occult positive.. he received 2 units PRBC.   Found to have bleeding duodenal ulcer on EGD  He had fever and septic work up was negative    Denies headache.   New dizziness when he gets up first thing in the morning. He is not sure if it is room spinning or lightheaded. Only present when he changes position     He notes he could not get an MRI due to his pacemaker. He had checked what type and had it verified previously    Denies constipation.   Denies chest pain or shortness of breath. He notes his breathing has been doing well and he has been off of trelegy. He notes he is overall feeling better       Patient Active Problem List   Diagnosis    Rotator cuff syndrome of left shoulder    History of smoking 25-50 pack years    Anemia    Vertigo    Carotid disease, bilateral    Meniere's disease    Presence of cardiac pacemaker    PVD (peripheral vascular disease)    RBBB (right bundle branch block with left anterior fascicular block)    Left knee pain    Easy bruising    Chronic left-sided low back pain    Arthritis    Abdominal  hernia    Right inguinal hernia    Dyspnea on exertion    Chest pain, atypical    Spinal stenosis of lumbar region with neurogenic claudication     Past Surgical History:   Procedure Laterality Date    Cardiac pacemaker placement  2018    Cataract      Colonoscopy      Colonoscopy N/A 10/08/2021    Procedure: COLONOSCOPY with cold snare polypectomy;  Surgeon: Stewart Tolentino MD;  Location:  ENDOSCOPY    Colonoscopy N/A 2024    Procedure: COLONOSCOPY with cold snare polypectomy;  Surgeon: Stewart Tolentino MD;  Location:  ENDOSCOPY    Dental surgery procedure  2009    dental implants    Hernia surgery Right 1973    Hernia surgery Bilateral     Lasik Bilateral     Other surgical history      Dental Implants 6 years ago    Pacemaker/defibrillator      2017    Repair ing hernia,5+y/o,reducibl      Repair of nasal septum N/A     Tonsillectomy  1950's    Vasectomy       Family History   Problem Relation Age of Onset    Other (Other) Father         dementia    Other (Other) Mother         COPD    Asthma Mother     Pulmonary Disease Mother         I reviewed his's Past Medical History, Past Surgical History, Family History and   Social History updated shows  Social History     Socioeconomic History    Marital status:    Tobacco Use    Smoking status: Former     Current packs/day: 0.00     Average packs/day: 1 pack/day for 52.8 years (52.8 ttl pk-yrs)     Types: Cigarettes     Start date: 1965     Quit date: 10/30/2017     Years since quittin.4     Passive exposure: Past (pt's parents both smoked in the home)    Smokeless tobacco: Never    Tobacco comments:     Updated 2/3/25   Vaping Use    Vaping status: Never Used   Substance and Sexual Activity    Alcohol use: Yes     Alcohol/week: 1.0 standard drink of alcohol     Types: 1 Standard drinks or equivalent per week     Comment: 1 drink weekly    Drug use: Never   Other Topics Concern    Caffeine Concern No    Exercise No     Seat Belt No    Special Diet No    Stress Concern No    Weight Concern No     Social Drivers of Health     Food Insecurity: Low Risk  (3/5/2025)    Received from [facility] Food Security     Within the past 12 months, the food you bought just didn't last and you didn't have money to get more.: 3     Within the past 12 months, you worried that your food would run out before you got money to buy more.: 3   Transportation Needs: Not At Risk (3/5/2025)    Received from [facility] Transportation Needs     In the past 12 months, has lack of reliable transportation kept you from medical appointments, meetings, work or from getting things needed for daily living?: No   Housing Stability: Not At Risk (3/5/2025)    Received from [facility] Housing     What is your living situation today?: I have a steady place to live     Think about the place you live. Do you have problems with any of the following?: None of the above     Allergies:  Allergies[1]  Current Outpatient Medications   Medication Sig Dispense Refill    ipratropium 0.06 % Nasal Solution       rosuvastatin 10 MG Oral Tab TAKE 1 TABLET(10 MG) BY MOUTH EVERY NIGHT 90 tablet 3    metFORMIN 500 MG Oral Tab TAKE 1 TABLET(500 MG) BY MOUTH DAILY WITH BREAKFAST 90 tablet 3    fluticasone-umeclidin-vilant (TRELEGY ELLIPTA) 100-62.5-25 MCG/ACT Inhalation Aerosol Powder, Breath Activated INHALE 1 PUFF INTO THE LUNGS DAILY (Patient taking differently: Inhale 1 puff into the lungs daily. 2/12/25-not using currently) 180 each 3    magnesium oxide 400 MG Oral Tab Take 1 tablet (400 mg total) by mouth daily.      aspirin 81 MG Oral Tab EC Take 1 tablet (81 mg total) by mouth daily. 90 tablet 3    famotidine 20 MG Oral Tab Take 1 tablet (20 mg total) by mouth 2 (two) times daily as needed for Heartburn. 30 tablet 0    brimonidine 0.2 % Ophthalmic Solution Place 1 drop into the right eye Q12H.      Misc Natural Products (GLUCOSAMINE CHONDROITIN ADV) Oral  Tab Take 1 tablet by mouth daily.      latanoprost 0.005 % Ophthalmic Solution Place 1 drop into both eyes nightly.      Multiple Vitamins-Minerals (PRESERVISION AREDS 2) Oral Cap take two tablets by mouth daily           REVIEW OF SYSTEMS:   Review of Systems   Constitutional:  Negative for fever.   Eyes:  Negative for visual disturbance.   Respiratory:  Negative for shortness of breath.    Cardiovascular:  Negative for chest pain.   Gastrointestinal:  Negative for constipation.   Neurological: Negative.    Hematological: Negative.    Psychiatric/Behavioral: Negative.          EXAM:   /76 (BP Location: Right arm, Patient Position: Sitting, Cuff Size: large)   Pulse 70   Ht 5' 7\" (1.702 m)   Wt 200 lb (90.7 kg)   SpO2 99%   BMI 31.32 kg/m²  Estimated body mass index is 31.32 kg/m² as calculated from the following:    Height as of this encounter: 5' 7\" (1.702 m).    Weight as of this encounter: 200 lb (90.7 kg).   Vital signs reviewed. Appears stated age, well groomed.  Physical Exam  Vitals reviewed.   Constitutional:       General: He is not in acute distress.     Appearance: He is well-developed.   HENT:      Head: Normocephalic and atraumatic.   Eyes:      Conjunctiva/sclera: Conjunctivae normal.   Cardiovascular:      Rate and Rhythm: Normal rate and regular rhythm.      Heart sounds: Normal heart sounds.   Pulmonary:      Effort: Pulmonary effort is normal.      Breath sounds: Normal breath sounds.   Musculoskeletal:      Cervical back: Neck supple.   Skin:     General: Skin is warm and dry.   Neurological:      General: No focal deficit present.      Mental Status: He is alert.   Psychiatric:         Mood and Affect: Mood normal.          ASSESSMENT AND PLAN:   Nasir Beauchamp is a 74 year old male with  1. Hospital discharge follow-up    2. Duodenal ulcer    3. Lower extremity edema    4. Memory loss    5. Anemia, unspecified type          The plan is as follows  Nasir was seen today for hospital  f/u.    Diagnoses and all orders for this visit:    Hospital discharge follow-up    Duodenal ulcer - he was not aware of this diagnosis and we discussed hospital discharge summary. Call today to schedule appt with GI specialist. Check Hgb. Continue off of aspirin for now.   -     Gastro Referral - In Network  -     CBC With Differential With Platelet; Future    Lower extremity edema - improving. Likely related to IV fluids during hospitalization. Recommend compression stockings to the knee, elevate legs and low salt diet. Discussed short course of furosemide but he declines for now.     Memory loss - reviewed CT results from hospitalization and no acute findings. He had no trauma at home and did not hit his head in the hospital as far as he knows. We discussed MRI but he is unable to do one due to his pacemaker and patient verified this previously. He notes a gap in memory during hospital stay but now is back to baseline. Will check TSH and B12. We discussed f/u with neurology   -     Vitamin B12; Future  -     TSH W Reflex To Free T4; Future    Anemia, unspecified type - check Hgb and iron studies. He is s/p 2 units PRBC  -     Ferritin [E]; Future  -     Iron And Tibc [E]; Future        Orders Placed This Encounter   Procedures    CBC With Differential With Platelet    Vitamin B12    TSH W Reflex To Free T4    Ferritin [E]    Iron And Tibc [E]       Meds & Refills for this Visit:  Requested Prescriptions      No prescriptions requested or ordered in this encounter       Imaging & Consults:  GASTRO - INTERNAL    Health Maintenance Due   Topic Date Due    Zoster Vaccines (1 of 2) Never done    COVID-19 Vaccine (3 - 2024-25 season) 09/01/2024    Annual Depression Screening  01/01/2025    Diabetes Care: Foot Exam (Annual)  01/01/2025    Diabetes Care: Microalb/Creat Ratio (Annual)  01/01/2025     Patient/Caregiver Education: Patient/Caregiver Education: There are no barriers to learning. Medical education done.  Outcome: Patient verbalizes understanding. Patient is notified to call with any questions, complications, allergies, or worsening or changing symptoms.  Patient is to call with any side effects or complications from the treatments as a result of today.     Staci Miguel MD         [1] No Known Allergies     rolling walker

## 2025-04-07 NOTE — PATIENT INSTRUCTIONS
Blood work today    Call to schedule appointment with gastroenterology for duodenal ulcer       Elevate your legs above the level of the heart.   Compression socks - knee high   Decrease salt in diet       Summary of hospital stay:  Admitted with dehydration and fall. Found to have fever and negative blood culture and normal Xray. CT scan of brain showed no acute findings.   You had a bleeding duodenal ulcer seen on upper endoscopy/EGD.   Anemia from bleeding ulcer and you received 2 units blood transfusion

## 2025-04-10 ENCOUNTER — APPOINTMENT (OUTPATIENT)
Dept: GENERAL RADIOLOGY | Age: 75
End: 2025-04-10
Attending: PHYSICIAN ASSISTANT
Payer: MEDICARE

## 2025-04-10 ENCOUNTER — APPOINTMENT (OUTPATIENT)
Dept: ULTRASOUND IMAGING | Age: 75
End: 2025-04-10
Attending: PHYSICIAN ASSISTANT
Payer: MEDICARE

## 2025-04-10 ENCOUNTER — HOSPITAL ENCOUNTER (EMERGENCY)
Age: 75
Discharge: HOME | End: 2025-04-10
Attending: EMERGENCY MEDICINE
Payer: MEDICARE

## 2025-04-10 VITALS
DIASTOLIC BLOOD PRESSURE: 70 MMHG | RESPIRATION RATE: 18 BRPM | SYSTOLIC BLOOD PRESSURE: 126 MMHG | TEMPERATURE: 98 F | OXYGEN SATURATION: 98 % | HEART RATE: 82 BPM

## 2025-04-10 DIAGNOSIS — R35.0 URINARY FREQUENCY: Primary | ICD-10-CM

## 2025-04-10 DIAGNOSIS — R60.0 BILATERAL LEG EDEMA: ICD-10-CM

## 2025-04-10 LAB
ALBUMIN SERPL-MCNC: 4.2 G/DL (ref 3.2–4.8)
ALBUMIN/GLOB SERPL: 1.6 {RATIO} (ref 1–2)
ALP LIVER SERPL-CCNC: 81 U/L (ref 45–117)
ALT SERPL-CCNC: 12 U/L (ref 10–49)
ANION GAP SERPL CALC-SCNC: 7 MMOL/L (ref 0–18)
AST SERPL-CCNC: 16 U/L (ref ?–34)
ATRIAL RATE: 83 BPM
BASOPHILS # BLD AUTO: 0.03 X10(3) UL (ref 0–0.2)
BASOPHILS NFR BLD AUTO: 0.3 %
BILIRUB SERPL-MCNC: 0.5 MG/DL (ref 0.2–1.1)
BILIRUB UR QL STRIP.AUTO: NEGATIVE
BUN BLD-MCNC: 19 MG/DL (ref 9–23)
CALCIUM BLD-MCNC: 9.4 MG/DL (ref 8.7–10.6)
CHLORIDE SERPL-SCNC: 102 MMOL/L (ref 98–112)
CLARITY UR REFRACT.AUTO: CLEAR
CO2 SERPL-SCNC: 29 MMOL/L (ref 21–32)
COLOR UR AUTO: YELLOW
CREAT BLD-MCNC: 0.98 MG/DL (ref 0.7–1.3)
EGFRCR SERPLBLD CKD-EPI 2021: 80 ML/MIN/1.73M2 (ref 60–?)
EOSINOPHIL # BLD AUTO: 0.21 X10(3) UL (ref 0–0.7)
EOSINOPHIL NFR BLD AUTO: 1.9 %
ERYTHROCYTE [DISTWIDTH] IN BLOOD BY AUTOMATED COUNT: 17.3 %
GLOBULIN PLAS-MCNC: 2.6 G/DL (ref 2–3.5)
GLUCOSE BLD-MCNC: 132 MG/DL (ref 70–99)
GLUCOSE UR STRIP.AUTO-MCNC: NEGATIVE MG/DL
HCT VFR BLD AUTO: 37.3 % (ref 39–53)
HGB BLD-MCNC: 12 G/DL (ref 13–17.5)
IMM GRANULOCYTES # BLD AUTO: 0.07 X10(3) UL (ref 0–1)
IMM GRANULOCYTES NFR BLD: 0.6 %
KETONES UR STRIP.AUTO-MCNC: NEGATIVE MG/DL
LEUKOCYTE ESTERASE UR QL STRIP.AUTO: NEGATIVE
LYMPHOCYTES # BLD AUTO: 2.32 X10(3) UL (ref 1–4)
LYMPHOCYTES NFR BLD AUTO: 21 %
MCH RBC QN AUTO: 27.6 PG (ref 26–34)
MCHC RBC AUTO-ENTMCNC: 32.2 G/DL (ref 31–37)
MCV RBC AUTO: 85.9 FL (ref 80–100)
MONOCYTES # BLD AUTO: 0.93 X10(3) UL (ref 0.1–1)
MONOCYTES NFR BLD AUTO: 8.4 %
NEUTROPHILS # BLD AUTO: 7.49 X10 (3) UL (ref 1.5–7.7)
NEUTROPHILS # BLD AUTO: 7.49 X10(3) UL (ref 1.5–7.7)
NEUTROPHILS NFR BLD AUTO: 67.8 %
NITRITE UR QL STRIP.AUTO: NEGATIVE
NT-PROBNP SERPL-MCNC: 332 PG/ML (ref ?–450)
OSMOLALITY SERPL CALC.SUM OF ELEC: 290 MOSM/KG (ref 275–295)
P AXIS: 59 DEGREES
P-R INTERVAL: 256 MS
PH UR STRIP.AUTO: 6 [PH] (ref 5–8)
PLATELET # BLD AUTO: 290 10(3)UL (ref 150–450)
POTASSIUM SERPL-SCNC: 4.1 MMOL/L (ref 3.5–5.1)
PROT SERPL-MCNC: 6.8 G/DL (ref 5.7–8.2)
PROT UR STRIP.AUTO-MCNC: NEGATIVE MG/DL
Q-T INTERVAL: 442 MS
QRS DURATION: 172 MS
QTC CALCULATION (BEZET): 519 MS
R AXIS: -81 DEGREES
RBC # BLD AUTO: 4.34 X10(6)UL (ref 3.8–5.8)
RBC UR QL AUTO: NEGATIVE
SODIUM SERPL-SCNC: 138 MMOL/L (ref 136–145)
SP GR UR STRIP.AUTO: 1.02 (ref 1–1.03)
T AXIS: 84 DEGREES
TROPONIN I SERPL HS-MCNC: 3 NG/L (ref ?–53)
UROBILINOGEN UR STRIP.AUTO-MCNC: 2 MG/DL
VENTRICULAR RATE: 83 BPM
WBC # BLD AUTO: 11.1 X10(3) UL (ref 4–11)

## 2025-04-10 PROCEDURE — 71045 X-RAY EXAM CHEST 1 VIEW: CPT | Performed by: PHYSICIAN ASSISTANT

## 2025-04-10 PROCEDURE — 99284 EMERGENCY DEPT VISIT MOD MDM: CPT

## 2025-04-10 PROCEDURE — 84484 ASSAY OF TROPONIN QUANT: CPT | Performed by: PHYSICIAN ASSISTANT

## 2025-04-10 PROCEDURE — 36415 COLL VENOUS BLD VENIPUNCTURE: CPT

## 2025-04-10 PROCEDURE — 93010 ELECTROCARDIOGRAM REPORT: CPT

## 2025-04-10 PROCEDURE — 93970 EXTREMITY STUDY: CPT | Performed by: PHYSICIAN ASSISTANT

## 2025-04-10 PROCEDURE — 81003 URINALYSIS AUTO W/O SCOPE: CPT | Performed by: PHYSICIAN ASSISTANT

## 2025-04-10 PROCEDURE — 83880 ASSAY OF NATRIURETIC PEPTIDE: CPT | Performed by: PHYSICIAN ASSISTANT

## 2025-04-10 PROCEDURE — 93005 ELECTROCARDIOGRAM TRACING: CPT

## 2025-04-10 PROCEDURE — 99285 EMERGENCY DEPT VISIT HI MDM: CPT

## 2025-04-10 PROCEDURE — 80053 COMPREHEN METABOLIC PANEL: CPT | Performed by: PHYSICIAN ASSISTANT

## 2025-04-10 PROCEDURE — 85025 COMPLETE CBC W/AUTO DIFF WBC: CPT | Performed by: PHYSICIAN ASSISTANT

## 2025-04-10 NOTE — ED INITIAL ASSESSMENT (HPI)
Pt having increased urination at night. 5-6x's a night. Onset 4 days ago. No pain w/ urination. Pt kary have some peripheral edema onset after epidural on Friday. R>L but bilateral. No RAFY.

## 2025-04-10 NOTE — ED PROVIDER NOTES
Patient Seen in: [facility] In [place]      History     Chief Complaint   Patient presents with    Urinary Symptoms     Stated Complaint: increased urination overnight for the past four days, denies pain    Subjective:   HPI      Patient presents to ER complaining of increased urination at nighttime for the last 4 nights.  States normally he would get up once in the middle of the night and now has been getting up approximately 5 times.  Denies any dysuria, hematuria, abdominal/flank pain or any other associated symptoms.  Patient does report that he has also had some bilateral lower extremity swelling that started around the same time.  States that he believes a half on the day that he had an epidural for his chronic back pain which was April 4.  Denies any complications during the procedure and it was done on an outpatient basis.  Patient does note that he also had a fall a month ago and was admitted to the hospital and then subsequently went to a rehab facility but was discharged home approximately 2 weeks ago and has been doing well otherwise since then.  Denies chest pain or shortness of breath.  Denies recent illnesses.  Denies fevers, chills, vomiting, diarrhea.  Denies any other complaints or concerns at this time.    Objective:     Past Medical History:    Abdominal hernia    ALCOHOL USE    1-2 times/wk    Arthritis    Back pain    Bronchitis    Chronic left-sided low back pain    Diabetes (HCC)    diet controlled - dx 2/2020    Easy bruising    Elevated lipase    Flatulence/gas pain/belching    Gout    Hernia, abdominal    1973 hernia repair. 2001 double hernia repair    Left knee pain    Obesity    Pacemaker    Pneumonia due to organism    Pulmonary emphysema (HCC)    Wears glasses              Past Surgical History:   Procedure Laterality Date    Cardiac pacemaker placement  2018    Cataract      Colonoscopy  2014    Colonoscopy N/A 10/08/2021    Procedure: COLONOSCOPY with cold  snare polypectomy;  Surgeon: Stewart Tolentino MD;  Location:  ENDOSCOPY    Colonoscopy N/A 2024    Procedure: COLONOSCOPY with cold snare polypectomy;  Surgeon: Stewart Tolentino MD;  Location:  ENDOSCOPY    Dental surgery procedure  2009    dental implants    Hernia surgery Right 1973    Hernia surgery Bilateral     Lasik Bilateral     Other surgical history      Dental Implants 6 years ago    Pacemaker/defibrillator      2017    Repair ing hernia,5+y/o,reducibl      Repair of nasal septum N/A     Tonsillectomy  1950's    Vasectomy                  Social History     Socioeconomic History    Marital status:    Tobacco Use    Smoking status: Former     Current packs/day: 0.00     Average packs/day: 1 pack/day for 52.8 years (52.8 ttl pk-yrs)     Types: Cigarettes     Start date: 1965     Quit date: 10/30/2017     Years since quittin.4     Passive exposure: Past (pt's parents both smoked in the home)    Smokeless tobacco: Never    Tobacco comments:     Updated 2/3/25   Vaping Use    Vaping status: Never Used   Substance and Sexual Activity    Alcohol use: Yes     Alcohol/week: 1.0 standard drink of alcohol     Types: 1 Standard drinks or equivalent per week     Comment: 1 drink weekly    Drug use: Never   Other Topics Concern    Caffeine Concern No    Exercise No    Seat Belt No    Special Diet No    Stress Concern No    Weight Concern No     Social Drivers of Health     Food Insecurity: Low Risk  (3/5/2025)    Received from [facility] Food Security     Within the past 12 months, the food you bought just didn't last and you didn't have money to get more.: 3     Within the past 12 months, you worried that your food would run out before you got money to buy more.: 3   Transportation Needs: Not At Risk (3/5/2025)    Received from [facility] Transportation Needs     In the past 12 months, has lack of reliable transportation kept you from medical appointments,  meetings, work or from getting things needed for daily living?: No   Housing Stability: Not At Risk (3/5/2025)    Received from [facility] Housing     What is your living situation today?: I have a steady place to live     Think about the place you live. Do you have problems with any of the following?: None of the above                  Physical Exam     ED Triage Vitals [04/10/25 1130]   /81   Pulse 92   Resp 20   Temp 98.2 °F (36.8 °C)   Temp src    SpO2 98 %   O2 Device None (Room air)       Current Vitals:   Vital Signs  BP: 126/70  Pulse: 82  Resp: 18  Temp: 98.2 °F (36.8 °C)    Oxygen Therapy  SpO2: 98 %  O2 Device: None (Room air)        Physical Exam  Vitals and nursing note reviewed.   Constitutional:       Appearance: Normal appearance.   HENT:      Head: Normocephalic.      Mouth/Throat:      Mouth: Mucous membranes are moist.   Eyes:      Conjunctiva/sclera: Conjunctivae normal.   Cardiovascular:      Rate and Rhythm: Normal rate and regular rhythm.      Heart sounds: Normal heart sounds.   Pulmonary:      Effort: Pulmonary effort is normal. No respiratory distress.      Breath sounds: Normal breath sounds. No stridor. No wheezing or rhonchi.   Musculoskeletal:         General: Normal range of motion.      Cervical back: Normal range of motion and neck supple.      Right lower leg: Tenderness (mild calf tenderness noted) present. No deformity. 2+ Edema present.      Left lower leg: No deformity. 1+ Edema present.   Skin:     General: Skin is warm and dry.   Neurological:      General: No focal deficit present.      Mental Status: He is alert.   Psychiatric:         Mood and Affect: Mood normal.             ED Course     Labs Reviewed   COMP METABOLIC PANEL (14) - Abnormal; Notable for the following components:       Result Value    Glucose 132 (*)     All other components within normal limits   CBC WITH DIFFERENTIAL WITH PLATELET - Abnormal; Notable for the following components:    WBC  11.1 (*)     HGB 12.0 (*)     HCT 37.3 (*)     All other components within normal limits   URINALYSIS WITH CULTURE REFLEX - Abnormal; Notable for the following components:    Urobilinogen Urine 2.0 (*)     All other components within normal limits   PRO BETA NATRIURETIC PEPTIDE - Normal   TROPONIN I HIGH SENSITIVITY - Normal     EKG    Rate, intervals and axes as noted on EKG Report.  Rate: 83bpm  Rhythm: paced rhythm                 US VENOUS DOPPLER LEG BILAT - DIAG IMG (CPT=93970)  Result Date: 4/10/2025  PROCEDURE:  US VENOUS DOPPLER LEG BILAT - DIAG IMG (CPT=93970)  COMPARISON:  None.  INDICATIONS:  eval for DVT, bilateral lower extremity edema  TECHNIQUE:  Real time, grey scale, and duplex ultrasound was used to evaluate the lower extremity venous system. B-mode two-dimensional images of the vascular structures, Doppler spectral analysis, and color flow.  Doppler imaging were performed.  The following veins were imaged bilaterally:  Common, deep, and superficial femoral, popliteal, sapheno-femoral junction, and posterior tibial veins.  PATIENT STATED HISTORY: (As transcribed by Technologist)  Patient states he has bilateral lower leg edema.    FINDINGS:  . THROMBI:  None visible. COMPRESSION:  Normal compressibility, phasicity, and augmentation. OTHER:  Negative.            CONCLUSION:  No acute deep vein thrombosis.   LOCATION:  [facility]   Dictated by (CST): Jeremy Rivers MD on 4/10/2025 at 12:48 PM     Finalized by (CST): Jeremy Rivers MD on 4/10/2025 at 12:48 PM       XR CHEST AP PORTABLE  (CPT=71045)  Result Date: 4/10/2025  PROCEDURE:  XR CHEST AP PORTABLE  (CPT=71045)  TECHNIQUE:  AP chest radiograph was obtained.  COMPARISON:  [facility], XR, XR CHEST AP PORTABLE  (CPT=71045), 9/10/2024, 5:10 PM.  [facility], XR, XR CHEST AP PORTABLE  (CPT=71045), 11/03/2023, 1:46 PM.  INDICATIONS:  increased urination overnight for the past four days, denies pain  PATIENT STATED HISTORY: (As transcribed by  Technologist)  Pt c/o frequent urination.  He denies other symtoms.  Former smoker, pacemaker placed 8-9 years ago.    FINDINGS:  Cardiac size and pulmonary vasculature are within normal limits. No pleural effusions. No pneumothorax.  Linear atelectasis or scarring within the left lung base.  Stable left-sided pacemaker.             CONCLUSION:  Linear atelectasis or scarring within left lung base.   LOCATION:  [facility]      Dictated by (CST): Jeremy Rivers MD on 4/10/2025 at 12:42 PM     Finalized by (CST): Jeremy Rivers MD on 4/10/2025 at 12:43 PM       I have reviewed chest xray images personally and see no obvious pneumonia.       MDM      Differential diagnosis includes but is not limited to UTI, BPH, hyperglycemia, edema, CHF, DVT.    Patient well-appearing, nontoxic.  He presents with complaints of increased urination over the last 4 nights but also notes that he has had bilateral lower extremity swelling during this time as well.  An IV was started and labs were drawn.  CBC shows mild anemia with hemoglobin of 12.0 which is improved from 11.4 on 4/7/2025.  Metabolic panel shows glucose of 132.  BNP within normal limits.  UA shows no evidence of UTI.  Chest x-ray shows linear atelectasis versus scarring to the left lower lobe but no other acute process.  Ultrasound venous Doppler bilateral legs shows no DVT.  Discussed case with Dr. Patel who has seen and evaluated patient personally and orders placed as discussed.  Reviewed all results and plan length with him.  No evidence of urinary tract infection, consider increased urination secondary to BPH.  We discussed all results and plan in length with patient and wife.  Advised LEORA hose and elevation of legs and other supportive care at home.  I advised close follow-up with PCP and urology, referral provided, to discuss further outpatient evaluation and possible management for BPH.  Provided specific return precautions.  Patient and wife verbalized  understanding agreement plan.        Medical Decision Making      Disposition and Plan     Clinical Impression:  1. Urinary frequency    2. Bilateral leg edema         Disposition:  Discharge  4/10/2025  1:57 pm    Follow-up:  Patricia Donahue PA-C  [address]  [address] [address]  [phone]    Follow up      [facility] in [address] [address]  [phone]  Follow up  As needed, If symptoms worsen          Medications Prescribed:  Discharge Medication List as of 4/10/2025  1:59 PM              Supplementary Documentation:

## 2025-04-10 NOTE — DISCHARGE INSTRUCTIONS
Return for new/worsening symptoms (ie abdominal pain, fevers, vomiting, chest pain, shortness of breath, etc)

## 2025-04-23 ENCOUNTER — TELEPHONE (OUTPATIENT)
Dept: PHYSICAL THERAPY | Facility: HOSPITAL | Age: 75
End: 2025-04-23

## 2025-04-24 ENCOUNTER — APPOINTMENT (OUTPATIENT)
Dept: PHYSICAL THERAPY | Age: 75
End: 2025-04-24
Attending: PHYSICAL MEDICINE & REHABILITATION
Payer: MEDICARE

## 2025-04-25 ENCOUNTER — PATIENT MESSAGE (OUTPATIENT)
Dept: PHYSICAL MEDICINE AND REHAB | Facility: CLINIC | Age: 75
End: 2025-04-25

## 2025-04-29 NOTE — TELEPHONE ENCOUNTER
RN called and spoke with patient and verified his confirmation for appointment on Thursday, 5/1/25 at 4:00PM.

## 2025-05-01 ENCOUNTER — TELEPHONE (OUTPATIENT)
Dept: PHYSICAL MEDICINE AND REHAB | Facility: CLINIC | Age: 75
End: 2025-05-01

## 2025-05-01 ENCOUNTER — OFFICE VISIT (OUTPATIENT)
Dept: PHYSICAL MEDICINE AND REHAB | Facility: CLINIC | Age: 75
End: 2025-05-01
Payer: MEDICARE

## 2025-05-01 ENCOUNTER — APPOINTMENT (OUTPATIENT)
Dept: PHYSICAL THERAPY | Age: 75
End: 2025-05-01
Attending: PHYSICAL MEDICINE & REHABILITATION
Payer: MEDICARE

## 2025-05-01 VITALS
DIASTOLIC BLOOD PRESSURE: 62 MMHG | OXYGEN SATURATION: 98 % | SYSTOLIC BLOOD PRESSURE: 120 MMHG | WEIGHT: 200 LBS | BODY MASS INDEX: 31.39 KG/M2 | HEIGHT: 67 IN | HEART RATE: 78 BPM

## 2025-05-01 DIAGNOSIS — M16.12 PRIMARY OSTEOARTHRITIS OF LEFT HIP: ICD-10-CM

## 2025-05-01 DIAGNOSIS — M16.12 ARTHRITIS OF LEFT HIP: Primary | ICD-10-CM

## 2025-05-01 RX ORDER — TRIAMCINOLONE ACETONIDE 40 MG/ML
40 INJECTION, SUSPENSION INTRA-ARTICULAR; INTRAMUSCULAR ONCE
Status: COMPLETED | OUTPATIENT
Start: 2025-05-01 | End: 2025-05-01

## 2025-05-01 RX ORDER — LIDOCAINE HYDROCHLORIDE 10 MG/ML
6 INJECTION, SOLUTION INFILTRATION; PERINEURAL ONCE
Status: COMPLETED | OUTPATIENT
Start: 2025-05-01 | End: 2025-05-01

## 2025-05-01 NOTE — TELEPHONE ENCOUNTER
Per CMS Guidelines -no authorization is required for Left hip CSI with USG.  CPT codes: 36248,   Completed in the office today.     Status: Authorization is not required based on medical necessity however is not a guarantee of payment and may be subject to review once claim is submitted.

## 2025-05-01 NOTE — PROGRESS NOTES
The following individual(s) verbally consented to be recorded using ambient AI listening technology and understand that they can each withdraw their consent to this listening technology at any point by asking the clinician to turn off or pause the recording:    Patient name: Nasir OSMAN Beauchamp  Additional names:

## 2025-05-01 NOTE — PROGRESS NOTES
RETURN PATIENT VISIT    CHIEF COMPLAINT  Low back and lower extremity radicular pain    INTERVAL HISTORY  Nasir Beauchamp is a 75 year old who was last seen in clinic on 3/20/2025 following up on L4-5 [place] TIARRA on 4/4/2025.  History of Present Illness  Nasir Beauchamp is a 75 year old male with severe hip arthritis and spinal stenosis who presents with worsening pain and discomfort.    He experiences significant pain and discomfort, especially at night, for almost four weeks. The pain originates in the hip, radiates to the buttock, knee, and shin, but does not reach the foot. Lying down exacerbates the pain, preventing him from sleeping on his left side and causing him to change bedrooms.    Topical treatments like Advil targeted relief cream and Nervine provide some relief. Applying cream to the protruding bone helps, but he still cannot lie on his left side. A recent hip injection offered minimal relief, and a previous groin injection and brace provided limited benefit.    He uses a walking aid due to instability and fear of falling while walking his dog. Some mornings he can walk unaided, but pain often necessitates the use of the aid.      REVIEW OF SYSTEMS  Review of systems was completed with the patient today as pertinent to today's visit    PHYSICAL EXAMINATION  CONSTITUTIONAL: Well-appearing, in no apparent distress  EYES: No scleral icterus or conjunctival hemorrhage  CARDIOVASCULAR: Skin warm and well-perfused, no peripheral edema  RESPIRATORY: Breathing unlabored without accessory muscle use  PSYCHIATRIC: Alert, cooperative, appropriate mood and affect  SKIN: No lesions or rashes on exposed skin  MUSCULOSKELETAL:   Patient with painful internal and external rotation of the left hip with pain in the groin and buttock on the left side.  Mildly positive Chetan to the left side with flexion of lateral leg radicular pain.    REVIEW OF PRIOR X-RAYS/STUDIES  CT Myelogram Lumbar Spine (2/18/25): Reviewed during  today's visit. Findings show mild to moderate multilevel degenerative changes, most pronounced at L3-4 and L4-5, where mild to moderate central canal stenosis and subarticular stenosis are present. There is also mild right neural foraminal stenosis at L3-4 and mild bilateral neural foraminal stenosis at L4-5. No acute fracture or significant spondylolisthesis.    Left Hip X-rays (10/21/24): Moderate joint space narrowing, subchondral sclerosis, and possible subchondral cysts at the femoral head-neck junction, consistent with Kellgren-Juan grade 3 osteoarthritis.     Lumbar Spine X-rays (4/19/24): Degenerative levoscoliosis, mild posterior subluxation of L3 on L4, anterior subluxation of L4 on L5, disc space narrowing at L1-2 and L3-4, facet arthropathy, and aortic calcifications. No acute abnormalities.    IMPRESSION/DIAGNOSIS  1.   Encounter Diagnoses   Name Primary?    Arthritis of left hip Yes    Primary osteoarthritis of left hip          TREATMENT/PLAN  Assessment & Plan  Spinal stenosis of lumbar region with neurogenic claudication  Chronic lumbar spinal stenosis with neurogenic claudication causing discomfort and radicular pain. Differential includes severe left hip arthritis. Focus on identifying primary pain source before surgery. Concerned about stenosis progression and surgery necessity.  - Repeat left hip injection with ultrasound guidance to assess response.  - Consider different back injection if unsuccessful.  - Consult orthopedic surgeons Donny or Jocelynn for potential surgery if injections fail.    Severe arthritis of left hip  Severe left hip arthritis causing groin and buttock pain. Previous injection provided minimal relief. Reassess injection effectiveness before surgery. Prefers to avoid surgery, open to different injection approaches.  - Perform left hip injection with ultrasound guidance today.  - Consider alternative injection approaches if unsuccessful.  - Consult orthopedic surgeons for  further evaluation if injections fail.      Education was provided regarding the above impression/diagnosis and treatment options/plan were discussed.  All questions were answered during today's visit.  Patient will contact clinic if any other questions or concerns.          Shade Moffett DO  Interventional Spine and Sports Medicine Specialist   Physical Medicine and Rehabilitation  [address] [address]    [facility]  [address] [place] [address]

## 2025-05-02 ENCOUNTER — PATIENT MESSAGE (OUTPATIENT)
Dept: PHYSICAL MEDICINE AND REHAB | Facility: CLINIC | Age: 75
End: 2025-05-02

## 2025-05-02 RX ORDER — GABAPENTIN 600 MG/1
600 TABLET ORAL 3 TIMES DAILY
Qty: 90 TABLET | Refills: 0 | Status: SHIPPED | OUTPATIENT
Start: 2025-05-02

## 2025-05-02 NOTE — TELEPHONE ENCOUNTER
Gabapentin       DOS: n/a  Last OV: 02/24/2025 Lumbar Radiculopathy  Last refill date: 02/24/2025     #/refills: 90/0  Upcoming appt: NONE with provider      Future Appointments   Date Time Provider Department Center   5/8/2025  8:30 AM Borgehammar, Vivek, PT, DPT, OCS, Cert MDT  [facility] [place]   5/15/2025  8:30 AM Borgehammar, Vivek, PT, DPT, OCS, Cert MDT  [facility] [place]   5/22/2025  8:30 AM Borgehammar, Vivek, PT, DPT, OCS, Cert MDT  [facility] [place]   5/29/2025  8:30 AM Borgehammar, Vivek, PT, DPT, OCS, Cert MDT  [facility] [place]   6/5/2025  8:30 AM Borgehammar, Vivek, PT, DPT, OCS, Cert MDT  [facility] [place]   6/9/2025  8:30 AM Staci Miguel MD [facility] [place]   6/12/2025  8:30 AM Shaneka, Vivek, PT, DPT, OCS, Cert MDT  [facility] [place]   6/23/2025  8:40 AM Stewart Tolentino MD [facility]   10/14/2025  8:20 AM Chase Stokes MD [id number] [place]

## 2025-05-02 NOTE — PROCEDURES
PROCEDURE NOTE    DIAGNOSIS  Left hip pain    PROCEDURE  Ultrasound guided Left hip injection    PERFORMING PHYSICIAN  Shade Moffett DO    PROCEDURE NOTE  Informed consent was obtained. Risks and benefits of the procedure were explained. The patient was placed in a supine position and the Left femoral head neck junction was identified under ultrasound using the curvilinear transducer. The area was prepped with Betadine x 6 and alcohol swab. Sterile ultrasound probe cover was used. Sterile ultrasound gel was applied. A 27-gauge 1.5 inch needle was inserted into this area and 1-2 mL of 1% lidocaine was infused subcutaneously to anesthetize the region. Then, a 22-gauge 3.5 inch needle was advanced under ultrasound guidance to the target, using an in-plane approach. Then, 1 mL of 40 mg/mL Triamcinolone and 4 mL of 1% Lidocaine was infused. Injectate was seen expanding the joint capsule. The patient tolerated the procedure without complications. Post procedure instructions were provided.        Shade Moffett DO  Physical Medicine and Rehabilitation  Interventional Spine and Sports Medicine   [facility]

## 2025-05-08 ENCOUNTER — OFFICE VISIT (OUTPATIENT)
Dept: PHYSICAL THERAPY | Age: 75
End: 2025-05-08
Attending: PHYSICAL MEDICINE & REHABILITATION
Payer: MEDICARE

## 2025-05-08 PROCEDURE — 97110 THERAPEUTIC EXERCISES: CPT | Performed by: PHYSICAL THERAPIST

## 2025-05-08 PROCEDURE — 97162 PT EVAL MOD COMPLEX 30 MIN: CPT | Performed by: PHYSICAL THERAPIST

## 2025-05-08 NOTE — PROGRESS NOTES
SPINE EVALUATION:     Diagnosis:   Spinal stenosis of lumbar region with neurogenic claudication (M48.062)  Primary osteoarthritis of left hip (M16.12) Patient:  Nasir Beauchamp (75 year old, male)        Referring Provider: Shade Moffett  Today's Date   5/8/2025    Precautions:      Date of Evaluation: 05/08/25  Next MD visit: No data recorded  Date of Surgery: 5/1/25     PATIENT SUMMARY   Summary of chief complaints: LBP, L hip pain, radiates into L LE  History of current condition: Back and hip pain started after a fall in March. Recent LESI had minimal effect, then had hip injection last week and has been ambulating better since, rarely using assistive device.   Pain level: current 0 /10, at best 0 /10, at worst 6 /10  Description of symptoms: pain in L butock, lateral hip and down thigh to knee   Occupation: Retired   Leisure activities/Hobbies: walking dog, golf, otherwise sedentary   Prior level of function: was walking around the block until late last summer, got painful and started limiting his walking distance. Used cane during the winter.  Current limitations: difficulty dressing, walking due to LE weakness, stairs, lying on L side.  Pt goals: improve strength and balance, walk better  Red flag signs/symptoms: Pt denies dizziness, drop attacks, dysphagia, dysarthria, diplopia    Past medical history was reviewed with Nasir.  Significant findings include:    Imaging/Tests: CT myelogram on file which is reviewed in full with does show moderate central canal narrowing   Nasir  has a past medical history of Abdominal hernia (1973 and 2001), ALCOHOL USE, Arthritis, Back pain (2016), Bronchitis (03/2023), Chronic left-sided low back pain, Diabetes (HCC), Easy bruising (2019), Elevated lipase (11/25/2015), Flatulence/gas pain/belching (Randomly), Gout, Hernia, abdominal (1973), Left knee pain, Obesity, Pacemaker (02/2018), Pneumonia due to organism (Apr 2023), Pulmonary emphysema (HCC) (Mother), and Wears  glasses (Reading glasses only).  He  has a past surgical history that includes repair ing hernia,5+y/o,reducibl; hernia surgery (Right, 1973); hernia surgery (Bilateral, 2001); repair of nasal septum (N/A, 2003); LASIK (Bilateral, 2003); dental surgery procedure (2009); colonoscopy (2014); Cardiac pacemaker placement (2018); cataract; colonoscopy (N/A, 10/08/2021); pacemaker/defibrillator; other surgical history; tonsillectomy (1950's); vasectomy (1978); colonoscopy (N/A, 12/13/2024); and reconstr nose+abhinav septal repair (06/2005 Polyps and deviated septum).    ASSESSMENT  Nasir presents to physical therapy evaluation with primary c/o LBP, L hip pain, radiates into L LE. The results of the objective tests and measures show limited L hip and lumbar ROM, weak trunk and hip mm, unsteady walking with decreased balance. Functional deficits include but are not limited to difficulty dressing, walking due to LE weakness, stairs, lying on L side.. Signs and symptoms are consistent with diagnosis of Spinal stenosis of lumbar region with neurogenic claudication (M48.062)  Primary osteoarthritis of left hip (M16.12). Pt and PT discussed evaluation findings, pathology, POC and HEP.  Pt voiced understanding and performs HEP correctly without reported pain. Skilled Physical Therapy is medically necessary to address the above impairments and reach functional goals.    OBJECTIVE:    Musculoskeletal:  Observation/Posture: forward head posture (sits and stands slouched)   Accessory Motion:     Palpation: no tenderness noted     Special tests:         ROM and Strength:  (* denotes performed with pain)  Trunk ROM MMT (-/5)     Flex 80% tight 4-     Ext 20%* L glut 4-    R L R L     Side bend 30%* L buttock 50% tight         Rotation           ,   Hip   ROM MMT (-/5)    R L R L     Flex (L2) ROM is functional R hip 100 5 4     Ext    10 2+ 3+     Abd   20 3 3     ER   40         IR   0        ,   Knee   ROM MMT (-/5)    R L R L     Flex  knee ROM WFL bilaterally   4+ 4+     Ext (L3)     4 5       Flexibility:  LE Flexibility R L     Hip Flexor mod restricted mod restricted     Hamstrings mod restricted mod restricted     ITB mod restricted mod restricted     Piriformis         Quads mod restricted mod restricted     Gastroc-soleus mod restricted mod restricted       Neurological:  Sensation: grossly intact to light touch VANDANA UE/LE     Deep Tendon Reflexes:       UMN:      Ramirez's: negative     Clonus: negative     Babinski:       Peripheral Neurodynamic: WNL except     Balance and Functional Mobility:  Gait: pt ambulates on level ground with decreased step length; shuffle; stooped posture/forward lean.  Balance: SLS: EO R  , EO L            Today's Treatment and Response:   Pt education was provided on exam findings, treatment diagnosis, treatment plan, expectations, and prognosis.  Today's Treatment       5/8/2025   Spine Treatment   Therapeutic Exercise LTR  Bridging  Ab curl up  PKB  Prone hip ext  SEDRICK  Sit to stand   Therapeutic Exercise Minutes 12   Evaluation Minutes 30   Total Time Of Timed Procedures 12   Total Time Of Service-Based Procedures 30   Total Treatment Time 42   HEP LTR  Bridging  Ab curl up  PKB  Prone hip ext  SEDRICK          Patient was instructed in and issued a HEP for: LTR  Bridging  Ab curl up  PKB  Prone hip ext  SEDRICK      Charges:  PT EVAL:  , TE1  In agreement with evaluation findings and clinical rationale, this evaluation involved MODERATE COMPLEXITY decision making due to 1-2 personal factors/comorbidities, 3 or more body structures involved/activity limitations, and evolving symptoms as documented in the evaluation.                                                                         PLAN OF CARE:    Goals: (to be met in 10 visits)     Long term goals to be reached in 10 visits.  Pt. will report decreased pain from 7/10 to 2/10 at worst.  Increase strength of bilateral hip ext4 to 4/5 to allow pt to climb stairs  and ambulate with fair safety.  Pt. will be able to tolerate walking for 10 minutes without increased symptoms.  Pt. will be able to perform ADLs with min symptoms.  Pt. will be independent with home exercise program and self management.       Frequency / Duration: Patient will be seen 2x/week or a total of 10  visits over a 90 day period. Treatment will include: Gait training; Manual Therapy; Neuromuscular Re-education; Therapeutic Exercise; Home Exercise Program instruction    Education or treatment limitation: None   Rehab Potential: fair     LEFS Score  No data recorded  Oswestry Disability Index Score  Score: (Patient-Rptd) 28 % (5/8/2025  8:45 AM)      Patient/Family/Caregiver was advised of these findings, precautions, and treatment options and has agreed to actively participate in planning and for this course of care.    Thank you for your referral. Please co-sign or sign and return this letter via fax as soon as possible to [phone]. If you have any questions, please contact me at Dept: [phone]    Sincerely,  Electronically signed by therapist: Vivek Munroe, PT, DPT, OCS, Cert MDT   Physician's certification required: Yes  I certify the need for these services furnished under this plan of treatment and while under my care.    X___________________________________________________ Date____________________    Certification From: 5/8/2025  To:8/6/2025

## 2025-05-09 RX ORDER — GABAPENTIN 600 MG/1
600 TABLET ORAL 3 TIMES DAILY
Qty: 90 TABLET | Refills: 0 | Status: SHIPPED | OUTPATIENT
Start: 2025-05-09

## 2025-05-15 ENCOUNTER — APPOINTMENT (OUTPATIENT)
Dept: PHYSICAL THERAPY | Age: 75
End: 2025-05-15
Attending: PHYSICAL MEDICINE & REHABILITATION
Payer: MEDICARE

## 2025-05-15 ENCOUNTER — TELEPHONE (OUTPATIENT)
Dept: PHYSICAL THERAPY | Facility: HOSPITAL | Age: 75
End: 2025-05-15

## 2025-05-22 ENCOUNTER — OFFICE VISIT (OUTPATIENT)
Dept: PHYSICAL THERAPY | Age: 75
End: 2025-05-22
Attending: PHYSICAL MEDICINE & REHABILITATION
Payer: MEDICARE

## 2025-05-22 PROCEDURE — 97110 THERAPEUTIC EXERCISES: CPT | Performed by: PHYSICAL THERAPIST

## 2025-05-22 NOTE — PROGRESS NOTES
Patient: Nasir Beacuhamp (75 year old, male) Referring Provider:  Insurance:   Diagnosis: Spinal stenosis of lumbar region with neurogenic claudication (M48.062)  Primary osteoarthritis of left hip (M16.12) Shade Moffett  MEDICARE   Date of Surgery: 5/1/25 Next MD visit:  [place] INDEMNITY   Precautions:    No data recorded Referral Information:    Date of Evaluation: Req: 0, Auth: 0, Exp:     05/08/25 POC Auth Visits:  10       Today's Date   5/22/2025    Subjective  pt states L hip is gradually feeling better, loosening up. Was able to walk his dog around the block yesterday for first time in over 3 weeks. Thinking about trying golf next week if feeling well.       Pain: 2/10     Objective  reviewed HEP. treatment focused on ROM, stability. LB stiffness subsides, 0/10 post session            Assessment  pt with OA and spinal stenosis, responds to general mobility and flexion biased exercises.    Goals (to be met in 10 visits)   Long term goals to be reached in 10 visits.  Pt. will report decreased pain from 7/10 to 2/10 at worst.  Increase strength of bilateral hip ext4 to 4/5 to allow pt to climb stairs and ambulate with fair safety.  Pt. will be able to tolerate walking for 10 minutes without increased symptoms.  Pt. will be able to perform ADLs with min symptoms.  Pt. will be independent with home exercise program and self management.           Plan  cont current HEP, gradually increase walking as able.    Treatment Last 4 Visits  Treatment Day: 2       5/8/2025 5/22/2025   Spine Treatment   Therapeutic Exercise LTR  Bridging  Ab curl up  PKB  Prone hip ext  SEDRICK  Sit to stand Nustep 8 min, L5  Shuttle squats 6 bands x 50  LTR x 30  DKTC sw ball x 30  Bridging sw ball x 20  Ab curl up x 20  PKB - 20 ea  Sit to stand x 10     Therapeutic Exercise Minutes 12 43   Evaluation Minutes 30    Total Time Of Timed Procedures 12 43   Total Time Of Service-Based Procedures 30 0   Total Treatment Time 42 43   HEP  LTR  Bridging  Ab curl up  PKB  Prone hip ext  SEDRICK   Cont current        HEP  Cont current    Charges  TE3

## 2025-05-29 ENCOUNTER — OFFICE VISIT (OUTPATIENT)
Dept: PHYSICAL THERAPY | Age: 75
End: 2025-05-29
Attending: PHYSICAL MEDICINE & REHABILITATION
Payer: MEDICARE

## 2025-05-29 ENCOUNTER — PATIENT MESSAGE (OUTPATIENT)
Dept: INTERNAL MEDICINE CLINIC | Facility: CLINIC | Age: 75
End: 2025-05-29

## 2025-05-29 DIAGNOSIS — R42 VERTIGO: Primary | ICD-10-CM

## 2025-05-29 PROCEDURE — 97110 THERAPEUTIC EXERCISES: CPT | Performed by: PHYSICAL THERAPIST

## 2025-05-29 NOTE — TELEPHONE ENCOUNTER
Dr. Miguel- See Kanmu message. Please advise. TY    OV- 4/07/25  Denies headache.   New dizziness when he gets up first thing in the morning. He is not sure if it is room spinning or lightheaded. Only present when he changes position

## 2025-05-29 NOTE — PROGRESS NOTES
Patient: Nasir Beauchamp (75 year old, male) Referring Provider:  Insurance:   Diagnosis: Spinal stenosis of lumbar region with neurogenic claudication (M48.062)  Primary osteoarthritis of left hip (M16.12) Shade Moffett  MEDICARE   Date of Surgery: 5/1/25 Next MD visit:  [facility] [place] INDEMNITY   Precautions:    No data recorded Referral Information:    Date of Evaluation: Req: 0, Auth: 0, Exp:     05/08/25 POC Auth Visits:  10       Today's Date   5/29/2025    Subjective  L hip cotinues to improve. Feeling a little dizzy today, pt has vertigo       Pain: 1/10     Objective  Visit abbreviated today due to pt experiencing some vertigo, he gets nauseated and asks to stop exercising. Pt was escorted to his car and states he feels fine to drive home.            Assessment  Vertigo limits participation today.    Goals (to be met in 10 visits)   Long term goals to be reached in 10 visits.  Pt. will report decreased pain from 7/10 to 2/10 at worst.  Increase strength of bilateral hip ext4 to 4/5 to allow pt to climb stairs and ambulate with fair safety.  Pt. will be able to tolerate walking for 10 minutes without increased symptoms.  Pt. will be able to perform ADLs with min symptoms.  Pt. will be independent with home exercise program and self management.               Plan  cont current HEP, gradually increase walking as able.    Treatment Last 4 Visits  Treatment Day: 3       5/8/2025 5/22/2025 5/29/2025   Spine Treatment   Therapeutic Exercise LTR  Bridging  Ab curl up  PKB  Prone hip ext  SEDRICK  Sit to stand Nustep 8 min, L5  Shuttle squats 6 bands x 50  LTR x 30  DKTC sw ball x 30  Bridging sw ball x 20  Ab curl up x 20  PKB - 20 ea  Sit to stand x 10   Nustep 8 min, L5  Upside down BOSU 4 way x 2 min ea  Shuttle squats 6 bands x 50  LTR x 30 - not today  DKTC sw ball x 30 - not today  Bridging sw ball x 20 - not today  Ab curl up x 20 - not today  PKB - 20 ea - not today  Sit to stand x 10 - not today     Therapeutic  Exercise Minutes 12 43 32   Evaluation Minutes 30     Total Time Of Timed Procedures 12 43 32   Total Time Of Service-Based Procedures 30 0 0   Total Treatment Time 42 43 32   HEP LTR  Bridging  Ab curl up  PKB  Prone hip ext  SEDRICK   Cont current         HEP  Cont current    Charges  TE2

## 2025-05-31 ENCOUNTER — PATIENT MESSAGE (OUTPATIENT)
Dept: INTERNAL MEDICINE CLINIC | Facility: CLINIC | Age: 75
End: 2025-05-31

## 2025-05-31 DIAGNOSIS — E11.9 TYPE 2 DIABETES MELLITUS WITHOUT COMPLICATION, WITHOUT LONG-TERM CURRENT USE OF INSULIN (HCC): Primary | ICD-10-CM

## 2025-05-31 DIAGNOSIS — D64.9 ANEMIA, UNSPECIFIED TYPE: ICD-10-CM

## 2025-05-31 DIAGNOSIS — E78.1 HYPERTRIGLYCERIDEMIA: ICD-10-CM

## 2025-06-02 RX ORDER — TRIAMTERENE AND HYDROCHLOROTHIAZIDE 37.5; 25 MG/1; MG/1
1 TABLET ORAL DAILY
Qty: 90 TABLET | Refills: 1 | Status: SHIPPED | OUTPATIENT
Start: 2025-06-02

## 2025-06-02 NOTE — TELEPHONE ENCOUNTER
See MCM - Pt requesting lab orders prior to upcoming appt.     Orders pended, Please sign if appropriate.     Future Appointments   Date Time Provider Department Center          6/9/2025  8:30 AM Staci Miguel MD [facility] [place]       LOV - 4/7  Hgb 8.8 and fecal occult positive.. he received 2 units PRBC.   Found to have bleeding duodenal ulcer on EGD  He had fever and septic work up was negative

## 2025-06-04 ENCOUNTER — OFFICE VISIT (OUTPATIENT)
Dept: PHYSICAL THERAPY | Facility: HOSPITAL | Age: 75
End: 2025-06-04
Attending: INTERNAL MEDICINE
Payer: MEDICARE

## 2025-06-04 DIAGNOSIS — R42 VERTIGO: Primary | ICD-10-CM

## 2025-06-04 PROCEDURE — 97162 PT EVAL MOD COMPLEX 30 MIN: CPT

## 2025-06-04 PROCEDURE — 95992 CANALITH REPOSITIONING PROC: CPT
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
## 2025-06-05 ENCOUNTER — APPOINTMENT (OUTPATIENT)
Dept: PHYSICAL THERAPY | Age: 75
End: 2025-06-05
Attending: PHYSICAL MEDICINE & REHABILITATION
Payer: MEDICARE

## 2025-06-06 ENCOUNTER — LAB ENCOUNTER (OUTPATIENT)
Dept: LAB | Facility: HOSPITAL | Age: 75
End: 2025-06-06
Attending: INTERNAL MEDICINE
Payer: MEDICARE

## 2025-06-06 ENCOUNTER — OFFICE VISIT (OUTPATIENT)
Dept: PHYSICAL THERAPY | Facility: HOSPITAL | Age: 75
End: 2025-06-06
Attending: INTERNAL MEDICINE
Payer: MEDICARE

## 2025-06-06 DIAGNOSIS — E11.9 TYPE 2 DIABETES MELLITUS WITHOUT COMPLICATION, WITHOUT LONG-TERM CURRENT USE OF INSULIN (HCC): ICD-10-CM

## 2025-06-06 DIAGNOSIS — D64.9 ANEMIA, UNSPECIFIED TYPE: ICD-10-CM

## 2025-06-06 LAB
BASOPHILS # BLD AUTO: 0.07 X10(3) UL (ref 0–0.2)
BASOPHILS NFR BLD AUTO: 0.5 %
CHOLEST SERPL-MCNC: 105 MG/DL (ref ?–200)
CREAT UR-SCNC: 103.9 MG/DL
DEPRECATED RDW RBC AUTO: 45.3 FL (ref 35.1–46.3)
EOSINOPHIL # BLD AUTO: 0.26 X10(3) UL (ref 0–0.7)
EOSINOPHIL NFR BLD AUTO: 1.9 %
ERYTHROCYTE [DISTWIDTH] IN BLOOD BY AUTOMATED COUNT: 16 % (ref 11–15)
EST. AVERAGE GLUCOSE BLD GHB EST-MCNC: 128 MG/DL (ref 68–126)
FASTING PATIENT LIPID ANSWER: YES
HBA1C MFR BLD: 6.1 % (ref ?–5.7)
HCT VFR BLD AUTO: 40.2 % (ref 39–53)
HDLC SERPL-MCNC: 28 MG/DL (ref 40–59)
HGB BLD-MCNC: 13 G/DL (ref 13–17.5)
IMM GRANULOCYTES # BLD AUTO: 0.14 X10(3) UL (ref 0–1)
IMM GRANULOCYTES NFR BLD: 1 %
LDLC SERPL CALC-MCNC: 40 MG/DL (ref ?–100)
LYMPHOCYTES # BLD AUTO: 2.93 X10(3) UL (ref 1–4)
LYMPHOCYTES NFR BLD AUTO: 21.8 %
MCH RBC QN AUTO: 26.1 PG (ref 26–34)
MCHC RBC AUTO-ENTMCNC: 32.3 G/DL (ref 31–37)
MCV RBC AUTO: 80.7 FL (ref 80–100)
MICROALBUMIN UR-MCNC: 1 MG/DL
MICROALBUMIN/CREAT 24H UR-RTO: 9.6 UG/MG (ref ?–30)
MONOCYTES # BLD AUTO: 1.39 X10(3) UL (ref 0.1–1)
MONOCYTES NFR BLD AUTO: 10.4 %
NEUTROPHILS # BLD AUTO: 8.62 X10 (3) UL (ref 1.5–7.7)
NEUTROPHILS # BLD AUTO: 8.62 X10(3) UL (ref 1.5–7.7)
NEUTROPHILS NFR BLD AUTO: 64.4 %
NONHDLC SERPL-MCNC: 77 MG/DL (ref ?–130)
PLATELET # BLD AUTO: 272 10(3)UL (ref 150–450)
RBC # BLD AUTO: 4.98 X10(6)UL (ref 3.8–5.8)
TRIGL SERPL-MCNC: 235 MG/DL (ref 30–149)
VLDLC SERPL CALC-MCNC: 32 MG/DL (ref 0–30)
WBC # BLD AUTO: 13.4 X10(3) UL (ref 4–11)

## 2025-06-06 PROCEDURE — 80061 LIPID PANEL: CPT

## 2025-06-06 PROCEDURE — 85025 COMPLETE CBC W/AUTO DIFF WBC: CPT

## 2025-06-06 PROCEDURE — 83036 HEMOGLOBIN GLYCOSYLATED A1C: CPT

## 2025-06-06 PROCEDURE — 82043 UR ALBUMIN QUANTITATIVE: CPT

## 2025-06-06 PROCEDURE — 95992 CANALITH REPOSITIONING PROC: CPT

## 2025-06-06 PROCEDURE — 36415 COLL VENOUS BLD VENIPUNCTURE: CPT

## 2025-06-06 PROCEDURE — 82570 ASSAY OF URINE CREATININE: CPT

## 2025-06-06 NOTE — PROGRESS NOTES
Patient: Nasir Beauchamp (75 year old, male) Referring Provider:  Insurance:   Diagnosis: Vertigo (R42) Staci Hernandezdipak  MEDICARE   Date of Surgery: na Next MD visit:  [place] [place] INDEMNITY   Precautions:  -- (diabetes, pacemaker 2018, h/o gout; visual impairment (reading glasses)) na Referral Information:    Date of Evaluation: Req: 0, Auth: 0, Exp:     06/04/25 POC Auth Visits:  8       Today's Date   6/6/2025    Subjective  Pt had a bad day yesterday but today is a little better. Brought his 4ww to keep steady.       Pain: pain not reported     Objective  see flowsheet: 0/10 dizziness            Assessment  Pt still had positive left posterior canal BPPV, therefore treated with CRM x3. 2nd and 3rd CRM pt did not have left torsional upbeating nystagmus but did have a persistent, low amplitude left beating with intermittent downbeats. Upon sitting up from the last CRM, pt had reversal (right downbeating), but was very brief. Will reassess all the canals next session.    Goals (to be met in 8 visits)   1.  Negative [place]-Hallpike and roll tests.  2.  Able to perform position changes such as supine to/from sit and bending over to the floor without dizziness to improve safety in functional tasks.  3.  Pt. To report 5 consecutive days without symptoms to promote resolved BPPV.  4. Pt will be independent with their HEP to promote compliance and self management of symptoms.           Plan  test all of the canals next session    Treatment Last 4 Visits  Treatment Day: 2       6/4/2025 6/6/2025   Vestibular Treatment   Additional Treatment CRM for left posterior canal BPPV x1    Pt edu: anatomy and physiology of vestibular system Fixation blocked: no spontaneous nystagmus, left beating nystagmus with left gaze    Left tai hallpike: 5 sec latency, 10-15 sec duration left torsional upbeating nsytagmus; added vibration to left mastoid. Demonstrated persistent left beating nystagmus, low amplitude.    Treated with CRM for left posterior  canal BPPV x3    Negative on the 2nd maneuver, but did view persistent, low amplitude left beating throughout.  3rd maneuver- same persistent, low amplitude left beating throughout with some downbeats. Reversal upon sitting up.    Evaluation Minutes 30    Canalith Repositioning Minutes 15 40   Total Time Of Timed Procedures 0 0   Total Time Of Service-Based Procedures 45 40   Total Treatment Time 45 40   HEP BPPV pt edu handout         HEP  BPPV pt edu handout    Charges  CRM

## 2025-06-09 ENCOUNTER — OFFICE VISIT (OUTPATIENT)
Dept: INTERNAL MEDICINE CLINIC | Facility: CLINIC | Age: 75
End: 2025-06-09
Payer: MEDICARE

## 2025-06-09 VITALS
WEIGHT: 189.63 LBS | OXYGEN SATURATION: 96 % | DIASTOLIC BLOOD PRESSURE: 62 MMHG | RESPIRATION RATE: 16 BRPM | SYSTOLIC BLOOD PRESSURE: 118 MMHG | HEART RATE: 91 BPM | BODY MASS INDEX: 29.76 KG/M2 | HEIGHT: 67 IN | TEMPERATURE: 98 F

## 2025-06-09 DIAGNOSIS — Z51.81 ENCOUNTER FOR MEDICATION MONITORING: ICD-10-CM

## 2025-06-09 DIAGNOSIS — E11.9 TYPE 2 DIABETES MELLITUS WITHOUT COMPLICATION, WITHOUT LONG-TERM CURRENT USE OF INSULIN (HCC): Primary | ICD-10-CM

## 2025-06-09 DIAGNOSIS — D72.829 LEUKOCYTOSIS, UNSPECIFIED TYPE: ICD-10-CM

## 2025-06-09 PROCEDURE — 99213 OFFICE O/P EST LOW 20 MIN: CPT | Performed by: INTERNAL MEDICINE

## 2025-06-09 NOTE — PROGRESS NOTES
[facility] Internal Medicine Office Note  Chief Complaint:   Chief Complaint   Patient presents with    Medication Follow-Up       HPI:   This is a 75 year old male coming in for  HPI    He has had decreased appetite due to chronic pain he notes    He has had back pain s/p injection. He is using a walker due to pain  Sees Dr. Moffett and has upcoming appt    Vertigo and restarted vestibular therapy   He restarted triamterene-hydrochlorothiazide             Problem List[1]  Past Surgical History[2]  Family History[3]     I reviewed his's Past Medical History, Past Surgical History, Family History and   Social History updated shows  Short Social Hx on File[4]  Allergies:  Allergies[5]  Current Medications[6]      REVIEW OF SYSTEMS:   Review of Systems   Constitutional:  Negative for fever.   Eyes:  Negative for visual disturbance.   Respiratory:  Negative for shortness of breath.    Cardiovascular:  Negative for chest pain.   Gastrointestinal:  Negative for constipation.   Neurological: Negative.    Hematological: Negative.    Psychiatric/Behavioral: Negative.          EXAM:   /62   Pulse 91   Temp 97.8 °F (36.6 °C) (Temporal)   Resp 16   Ht 5' 7\" (1.702 m)   Wt 189 lb 9.6 oz (86 kg)   SpO2 96%   BMI 29.70 kg/m²  Estimated body mass index is 29.7 kg/m² as calculated from the following:    Height as of this encounter: 5' 7\" (1.702 m).    Weight as of this encounter: 189 lb 9.6 oz (86 kg).   Vital signs reviewed. Appears stated age, well groomed.  Physical Exam  Vitals reviewed.   Constitutional:       General: He is not in acute distress.     Appearance: He is well-developed.   HENT:      Head: Normocephalic and atraumatic.   Eyes:      Conjunctiva/sclera: Conjunctivae normal.   Cardiovascular:      Rate and Rhythm: Normal rate and regular rhythm.      Heart sounds: Normal heart sounds.   Pulmonary:      Effort: Pulmonary effort is normal.      Breath sounds: Normal breath sounds.    Musculoskeletal:      Cervical back: Neck supple.   Skin:     General: Skin is warm and dry.   Neurological:      General: No focal deficit present.      Mental Status: He is alert.          ASSESSMENT AND PLAN:   Nasir Beauchamp is a 75 year old male with  1. Type 2 diabetes mellitus without complication, without long-term current use of insulin (HCC)    2. Leukocytosis, unspecified type    3. Encounter for medication monitoring          The plan is as follows  Nasir was seen today for medication follow-up.    Diagnoses and all orders for this visit:    Type 2 diabetes mellitus without complication, without long-term current use of insulin (HCC) - A1c improved at 6.1. Diet controlled   -     Hemoglobin A1C; Future - check A1c after 9/6/25    Leukocytosis, unspecified type -recheck with next labs. He notes recent steroid injection   -     CBC With Differential With Platelet; Future    Encounter for medication monitoring - check electrolytes for hydrochlorothiazide-trimterene with next blood work   -     Basic Metabolic Panel (8) [E]; Future    Have protein shake/Glucerna if not feeling too hungry and missing meal    Orders Placed This Encounter   Procedures    CBC With Differential With Platelet    Hemoglobin A1C    Basic Metabolic Panel (8) [E]       Meds & Refills for this Visit:  Requested Prescriptions      No prescriptions requested or ordered in this encounter       Imaging & Consults:  None    Health Maintenance Due   Topic Date Due    Zoster Vaccines (1 of 2) Never done    COVID-19 Vaccine (3 - 2024-25 season) 09/01/2024    Diabetes Care Foot Exam  12/01/2024    Annual Depression Screening  01/01/2025     Patient/Caregiver Education: Patient/Caregiver Education: There are no barriers to learning. Medical education done. Outcome: Patient verbalizes understanding. Patient is notified to call with any questions, complications, allergies, or worsening or changing symptoms.  Patient is to call with any side  effects or complications from the treatments as a result of today.     Staci Miguel MD         [1]   Patient Active Problem List  Diagnosis    Rotator cuff syndrome of left shoulder    History of smoking 25-50 pack years    Anemia    Vertigo    Carotid disease, bilateral    Meniere's disease    Presence of cardiac pacemaker    PVD (peripheral vascular disease)    RBBB (right bundle branch block with left anterior fascicular block)    Left knee pain    Easy bruising    Chronic left-sided low back pain    Arthritis    Abdominal hernia    Right inguinal hernia    Dyspnea on exertion    Chest pain, atypical    Spinal stenosis of lumbar region with neurogenic claudication   [2]   Past Surgical History:  Procedure Laterality Date    Cardiac pacemaker placement  2018    Cataract      Colonoscopy  2014    Colonoscopy N/A 10/08/2021    Procedure: COLONOSCOPY with cold snare polypectomy;  Surgeon: Stewart Tolentino MD;  Location:  ENDOSCOPY    Colonoscopy N/A 12/13/2024    Procedure: COLONOSCOPY with cold snare polypectomy;  Surgeon: Stewart Tolentino MD;  Location:  ENDOSCOPY    Dental surgery procedure  2009    dental implants    Hernia surgery Right 1973    Hernia surgery Bilateral 2001    Lasik Bilateral 2003    Other surgical history      Dental Implants 6 years ago    Pacemaker/defibrillator      2017    Reconstr nose+abhinav septal repair  06/2005 Polyps and deviated septum    Repair ing hernia,5+y/o,reducibl      Repair of nasal septum N/A 2003    Tonsillectomy  1950's    Vasectomy  1978   [3]   Family History  Problem Relation Age of Onset    Other (Other) Father         dementia    Other (Other) Mother         COPD    Asthma Mother     Pulmonary Disease Mother    [4]   Social History  Socioeconomic History    Marital status:    Tobacco Use    Smoking status: Former     Current packs/day: 0.00     Average packs/day: 1 pack/day for 52.8 years (52.8 ttl pk-yrs)     Types: Cigarettes     Start date: 1/1/1965      Quit date: 10/30/2017     Years since quittin.6     Passive exposure: Past (pt's parents both smoked in the home)    Smokeless tobacco: Never    Tobacco comments:     Updated 2/3/25   Vaping Use    Vaping status: Never Used   Substance and Sexual Activity    Alcohol use: Yes     Alcohol/week: 1.0 standard drink of alcohol     Types: 1 Standard drinks or equivalent per week     Comment: 1 drink weekly    Drug use: Never   Other Topics Concern    Caffeine Concern No    Exercise No    Seat Belt No    Special Diet No    Stress Concern No    Weight Concern No     Social Drivers of Health     Food Insecurity: Low Risk  (3/5/2025)    Received from [facility] Food Security     Within the past 12 months, the food you bought just didn't last and you didn't have money to get more.: 3     Within the past 12 months, you worried that your food would run out before you got money to buy more.: 3   Transportation Needs: Not At Risk (3/5/2025)    Received from [facility] Transportation Needs     In the past 12 months, has lack of reliable transportation kept you from medical appointments, meetings, work or from getting things needed for daily living?: No   Housing Stability: Not At Risk (3/5/2025)    Received from [facility] Housing     What is your living situation today?: I have a steady place to live     Think about the place you live. Do you have problems with any of the following?: None of the above   [5] No Known Allergies  [6]   Current Outpatient Medications   Medication Sig Dispense Refill    Triamterene-HCTZ 37.5-25 MG Oral Tab Take 1 tablet by mouth daily. 90 tablet 1    ipratropium 0.06 % Nasal Solution       rosuvastatin 10 MG Oral Tab TAKE 1 TABLET(10 MG) BY MOUTH EVERY NIGHT 90 tablet 3    brimonidine 0.2 % Ophthalmic Solution Place 1 drop into the right eye Q12H.      latanoprost 0.005 % Ophthalmic Solution Place 1 drop into both eyes nightly.      Multiple Vitamins-Minerals  (PRESERVISION AREDS 2) Oral Cap take two tablets by mouth daily

## 2025-06-09 NOTE — PATIENT INSTRUCTIONS
At the last visit the patient described an intermittent dry cough worse in the mornings with a globus sensation nonresponsive to a short course of low-dose Prilosec. I recommended a humidifier, Neti pot and Flonase at the last visit. He did not use the Neti pot or humidifier, however, he did use the Flonase for 3 weeks without an appreciable difference.   Blood work on 9/6/25 or later    Make sure you are getting enough protein on days were you don't have much appetite     Dermatology:  Dr. Rosenberg in [place] [phone]  Dr. Lopez in [place] [phone]

## 2025-06-11 ENCOUNTER — OFFICE VISIT (OUTPATIENT)
Dept: PHYSICAL THERAPY | Facility: HOSPITAL | Age: 75
End: 2025-06-11
Attending: INTERNAL MEDICINE
Payer: MEDICARE

## 2025-06-11 PROCEDURE — 95992 CANALITH REPOSITIONING PROC: CPT

## 2025-06-11 NOTE — PROGRESS NOTES
Patient: Nasir Beauchamp (75 year old, male) Referring Provider:  Insurance:   Diagnosis: Vertigo (R42) Staci Hernandezdipak  MEDICARE   Date of Surgery: na Next MD visit:  [facility] [employer]   Precautions:  -- (diabetes, pacemaker 2018, h/o gout; visual impairment (reading glasses)) na Referral Information:    Date of Evaluation: Req: 0, Auth: 0, Exp:     06/04/25 POC Auth Visits:  8       Today's Date   6/11/2025    Subjective  Pt had a pretty bad day Saturday with dizziness and the left hip issue.       Pain: pain not reported     Objective  see flowsheet: 0/10 dizziness            Assessment  Pt has positive left DHP but was very brief. Will reassess next session.    Goals (to be met in 8 visits)   1.  Negative Tai-Hallpike and roll tests.  2.  Able to perform position changes such as supine to/from sit and bending over to the floor without dizziness to improve safety in functional tasks.  3.  Pt. To report 5 consecutive days without symptoms to promote resolved BPPV.  4. Pt will be independent with their HEP to promote compliance and self management of symptoms.               Plan  reassess left DHP- semont is positive    Treatment Last 4 Visits  Treatment Day: 3       6/4/2025 6/6/2025 6/11/2025   Vestibular Treatment   Additional Treatment CRM for left posterior canal BPPV x1    Pt edu: anatomy and physiology of vestibular system Fixation blocked: no spontaneous nystagmus, left beating nystagmus with left gaze    Left tai hallpike: 5 sec latency, 10-15 sec duration left torsional upbeating nsytagmus; added vibration to left mastoid. Demonstrated persistent left beating nystagmus, low amplitude.    Treated with CRM for left posterior canal BPPV x3    Negative on the 2nd maneuver, but did view persistent, low amplitude left beating throughout.  3rd maneuver- same persistent, low amplitude left beating throughout with some downbeats. Reversal upon sitting up.  Fixation blocked: no spontaneous nystagmus, left beating  nystagmus with left gaze     Negative supine roll test  Negative right DHP    Left tai hallpike: 3 sec latency, 5 sec duration left torsional upbeating nsytagmus + symptoms but brief    Treated with CRM for left posterior canal BPPV x2   -negative during 2nd CRM    -if negative next session use Semont   Evaluation Minutes 30     Canalith Repositioning Minutes 15 40 25   Total Time Of Timed Procedures 0 0 0   Total Time Of Service-Based Procedures 45 40 25   Total Treatment Time 45 40 25   HEP BPPV pt edu handout          HEP  BPPV pt edu handout    Charges  CRM

## 2025-06-12 ENCOUNTER — OFFICE VISIT (OUTPATIENT)
Dept: PHYSICAL THERAPY | Age: 75
End: 2025-06-12
Attending: PHYSICAL MEDICINE & REHABILITATION
Payer: MEDICARE

## 2025-06-12 PROCEDURE — 97110 THERAPEUTIC EXERCISES: CPT | Performed by: PHYSICAL THERAPIST

## 2025-06-12 NOTE — PROGRESS NOTES
Patient: Nasir Beauchamp (75 year old, male) Referring Provider:  Insurance:   Diagnosis: Spinal stenosis of lumbar region with neurogenic claudication (M48.062)  Primary osteoarthritis of left hip (M16.12) Shade Moffett  MEDICARE   Date of Surgery: 5/1/25 Next MD visit:  [facility] [place] [employer]   Precautions:  -- (diabetes, pacemaker 2018, h/o gout; visual impairment (reading glasses)) No data recorded Referral Information:    Date of Evaluation: Req: 0, Auth: 0, Exp:     05/08/25 POC Auth Visits:  10       Today's Date   6/12/2025    Subjective  less dizzy, feeling unsteady and weak, L hip pain with active flexion       Pain: 2/10     Objective  pt initially with L hip pain with active flexion, this decreases with treatment and is minimal post session.            Assessment  Pt with continued s&s of lumbar stenosis, now using rollator due to unsteadiness, but improves with treatment. Pt will benefit from continued PT for strength, stability and coordination to improve safety.    Goals (to be met in 10 visits)   Long term goals to be reached in 10 visits.  Pt. will report decreased pain from 7/10 to 2/10 at worst.  Increase strength of bilateral hip ext4 to 4/5 to allow pt to climb stairs and ambulate with fair safety.  Pt. will be able to tolerate walking for 10 minutes without increased symptoms.  Pt. will be able to perform ADLs with min symptoms.  Pt. will be independent with home exercise program and self management.                   Plan  to Physiatry 6/16/25, tentatively cont PT pending DO recommendation.    Treatment Last 4 Visits  Treatment Day: 4       5/22/2025 5/29/2025 6/5/2025 6/12/2025   Spine Treatment   Therapeutic Exercise Nustep 8 min, L5  Shuttle squats 6 bands x 50  LTR x 30  DKTC sw ball x 30  Bridging sw ball x 20  Ab curl up x 20  PKB - 20 ea  Sit to stand x 10   Nustep 8 min, L5  Upside down BOSU 4 way x 2 min ea  Shuttle squats 6 bands x 50  LTR x 30 - not today  DKTC sw ball x 30 - not  today  Bridging sw ball x 20 - not today  Ab curl up x 20 - not today  PKB - 20 ea - not today  Sit to stand x 10 - not today    Nustep 14 min, L5  Upside down BOSU 4 way x 4 min ea  Shuttle squats 6 bands x 50  Sit to stand x 5  Stand heel raise 2x10       Gait Training    Gait with rollator x 4 min   Therapeutic Exercise Minutes 43 32  38   Gait Training Minutes    4   Total Time Of Timed Procedures 43 32 0 42   Total Time Of Service-Based Procedures 0 0 0 0   Total Treatment Time 43 32 0 42   HEP Cont current           HEP  Cont current    Charges  TE3

## 2025-06-13 ENCOUNTER — OFFICE VISIT (OUTPATIENT)
Dept: PHYSICAL THERAPY | Facility: HOSPITAL | Age: 75
End: 2025-06-13
Attending: INTERNAL MEDICINE
Payer: MEDICARE

## 2025-06-13 PROCEDURE — 97112 NEUROMUSCULAR REEDUCATION: CPT

## 2025-06-13 NOTE — PROGRESS NOTES
Discharge Summary  Pt has attended 4 visits in Physical Therapy.    Patient: Nasir Beauchamp (75 year old, male) Referring Provider:  Insurance:   Diagnosis: Vertigo (R42) Staci Miguel  MEDICARE   Date of Surgery: na Next MD visit:  [facility] [employer]   Precautions:  -- (diabetes, pacemaker 2018, h/o gout; visual impairment (reading glasses)) na Referral Information:    Date of Evaluation: Req: 0, Auth: 0, Exp:     06/04/25 POC Auth Visits:  8       Today's Date   6/13/2025    Subjective  Pt reports less dizziness but is feeling more imbalanced.       Pain: pain not reported     Objective  see flowsheet            Assessment  Pt has negative positional exam and reports diminished symptoms of vertigo. He is more concerned with his balance but this is likely related to lumbar radiculopathy. This will be addressed on Monday with Dr. Moffett from physiatry.    Goals (to be met in 8 visits)   1.  Negative [place]-Hallpike and roll tests. met  2.  Able to perform position changes such as supine to/from sit and bending over to the floor without dizziness to improve safety in functional tasks. met  3.  Pt. To report 5 consecutive days without symptoms to promote resolved BPPV. Mostly met- has been good since last session  4. Pt will be independent with their HEP to promote compliance and self management of symptoms.  met                 Plan  DC from PT Call for remaining questions. If vertigo returns, pt should get a new referral from PCP.    Treatment Last 4 Visits  Treatment Day: 4       6/4/2025 6/6/2025 6/11/2025 6/13/2025   Vestibular Treatment   Neuro Re-Education    Possible low amplitude left spontaneous beating fixation blocked; left gaze evoked beating to left fixation blocked    Left tai hallpike: negative, no symptoms    Review motion sensitivity and pt edu   Additional Treatment CRM for left posterior canal BPPV x1    Pt edu: anatomy and physiology of vestibular system Fixation blocked: no spontaneous nystagmus,  left beating nystagmus with left gaze    Left tai hallpike: 5 sec latency, 10-15 sec duration left torsional upbeating nsytagmus; added vibration to left mastoid. Demonstrated persistent left beating nystagmus, low amplitude.    Treated with CRM for left posterior canal BPPV x3    Negative on the 2nd maneuver, but did view persistent, low amplitude left beating throughout.  3rd maneuver- same persistent, low amplitude left beating throughout with some downbeats. Reversal upon sitting up.  Fixation blocked: no spontaneous nystagmus, left beating nystagmus with left gaze     Negative supine roll test  Negative right DHP    Left tai hallpike: 3 sec latency, 5 sec duration left torsional upbeating nsytagmus + symptoms but brief    Treated with CRM for left posterior canal BPPV x2   -negative during 2nd CRM    [facility]    Neuro Re-Educ Minutes    15   Evaluation Minutes 30      Canalith Repositioning Minutes 15 40 25    Total Time Of Timed Procedures 0 0 0 15   Total Time Of Service-Based Procedures 45 40 25 0   Total Treatment Time 45 40 25 15   HEP BPPV pt edu handout           HEP  BPPV pt edu handout    Charges  1 NMR

## 2025-06-16 ENCOUNTER — OFFICE VISIT (OUTPATIENT)
Dept: PHYSICAL MEDICINE AND REHAB | Facility: CLINIC | Age: 75
End: 2025-06-16
Payer: MEDICARE

## 2025-06-16 VITALS
DIASTOLIC BLOOD PRESSURE: 56 MMHG | WEIGHT: 189 LBS | HEART RATE: 72 BPM | HEIGHT: 67 IN | BODY MASS INDEX: 29.66 KG/M2 | OXYGEN SATURATION: 99 % | SYSTOLIC BLOOD PRESSURE: 100 MMHG

## 2025-06-16 DIAGNOSIS — M54.50 CHRONIC LEFT-SIDED LOW BACK PAIN WITHOUT SCIATICA: Primary | ICD-10-CM

## 2025-06-16 DIAGNOSIS — G89.29 CHRONIC LEFT-SIDED LOW BACK PAIN WITHOUT SCIATICA: Primary | ICD-10-CM

## 2025-06-16 DIAGNOSIS — M16.12 ARTHRITIS OF LEFT HIP: ICD-10-CM

## 2025-06-16 PROCEDURE — 99213 OFFICE O/P EST LOW 20 MIN: CPT | Performed by: PHYSICAL MEDICINE & REHABILITATION

## 2025-06-16 NOTE — PROGRESS NOTES
RETURN PATIENT VISIT    CHIEF COMPLAINT  Low back and lower extremity radicular pain    INTERVAL HISTORY  Nasir Beauchamp is a 75 year old who was last seen in clinic on 5/1/2025, at that visit he was following up with worsening pain and discomfort.  Previously underwent an L4-5 interlaminar epidural steroid injection on 4/4/2025.  He underwent a repeat left hip corticosteroid injection on 5/1/2025.  This is about 6 weeks ago.  History of Present Illness  Nasir Beauchamp is a 75 year old male with a history of spinal stenosis who presents with persistent hip and back pain.    He experiences ongoing pain in the left hip and lower back, which disrupts sleep. The pain is most pronounced when sitting, affecting the buttock and groin, and when walking, affecting the hip. Gabapentin aids sleep but is not used consistently. He uses a walker for mobility but attended the appointment without it, feeling better than in recent weeks.    In April, he received a hip injection, and in May, a spinal injection, with minimal relief. Pain has not improved significantly until a few days ago, with less discomfort than two weeks ago. He completed therapy for vertigo last week, which is no longer an issue. He aims to improve mobility for activities like golf.      REVIEW OF SYSTEMS  Review of systems was completed with the patient today as pertinent to today's visit    PHYSICAL EXAMINATION  CONSTITUTIONAL: Well-appearing, in no apparent distress  EYES: No scleral icterus or conjunctival hemorrhage  CARDIOVASCULAR: Skin warm and well-perfused, no peripheral edema  RESPIRATORY: Breathing unlabored without accessory muscle use  PSYCHIATRIC: Alert, cooperative, appropriate mood and affect  SKIN: No lesions or rashes on exposed skin  MUSCULOSKELETAL:   Physical Exam  Patient has ongoing positive internal/external rotation with exacerbation of left groin and buttock pain.      REVIEW OF PRIOR X-RAYS/STUDIES  No new imaging to  review    IMPRESSION/DIAGNOSIS  1.   Encounter Diagnoses   Name Primary?    Chronic left-sided low back pain without sciatica Yes    Arthritis of left hip      TREATMENT/PLAN  Assessment & Plan  Hip pain due to hip arthritis  Chronic hip pain likely due to arthritis, minimally relieved by previous injection. Primary issue affecting mobility. Discussed hip replacement surgery benefits with Dr. Hines.  - Refer to Dr. Hines for consultation regarding hip replacement surgery.    Spinal stenosis  Chronic back pain. Addressing hip may improve mobility and alleviate back pain. Surgery not recommended currently.    Chronic pain  Chronic pain in hip and back. Gabapentin prescribed but inconsistent use noted. Emphasized importance of consistent use for effective nerve pain management.  - Advise consistent use of gabapentin for nerve pain management.      Education was provided regarding the above impression/diagnosis and treatment options/plan were discussed.  All questions were answered during today's visit.  Patient will contact clinic if any other questions or concerns.          Shade Moffett DO  Interventional Spine and Sports Medicine Specialist   Physical Medicine and Rehabilitation  [facility]  [address] [place] [id number]    [address] [place] [address]

## 2025-06-19 ENCOUNTER — APPOINTMENT (OUTPATIENT)
Dept: PHYSICAL THERAPY | Facility: HOSPITAL | Age: 75
End: 2025-06-19
Attending: INTERNAL MEDICINE
Payer: MEDICARE

## 2025-06-22 ENCOUNTER — PATIENT MESSAGE (OUTPATIENT)
Dept: INTERNAL MEDICINE CLINIC | Facility: CLINIC | Age: 75
End: 2025-06-22

## 2025-06-30 ENCOUNTER — APPOINTMENT (OUTPATIENT)
Dept: PHYSICAL THERAPY | Facility: HOSPITAL | Age: 75
End: 2025-06-30
Attending: INTERNAL MEDICINE
Payer: MEDICARE

## 2025-07-03 ENCOUNTER — LAB ENCOUNTER (OUTPATIENT)
Dept: LAB | Age: 75
End: 2025-07-03
Attending: INTERNAL MEDICINE
Payer: MEDICARE

## 2025-07-03 DIAGNOSIS — K26.9 DUODENAL ULCER DISEASE: ICD-10-CM

## 2025-07-03 PROCEDURE — 87338 HPYLORI STOOL AG IA: CPT

## 2025-07-06 LAB — H PYLORI AG STL QL IA: NEGATIVE

## 2025-07-11 ENCOUNTER — APPOINTMENT (OUTPATIENT)
Dept: PHYSICAL THERAPY | Facility: HOSPITAL | Age: 75
End: 2025-07-11
Attending: INTERNAL MEDICINE

## 2025-07-18 ENCOUNTER — APPOINTMENT (OUTPATIENT)
Dept: PHYSICAL THERAPY | Facility: HOSPITAL | Age: 75
End: 2025-07-18
Attending: INTERNAL MEDICINE

## 2025-07-18 ENCOUNTER — OFFICE VISIT (OUTPATIENT)
Dept: ORTHOPEDICS CLINIC | Facility: CLINIC | Age: 75
End: 2025-07-18
Payer: MEDICARE

## 2025-07-18 VITALS — WEIGHT: 196 LBS | HEIGHT: 67 IN | BODY MASS INDEX: 30.76 KG/M2

## 2025-07-18 DIAGNOSIS — M18.11 PRIMARY OSTEOARTHRITIS OF FIRST CARPOMETACARPAL JOINT OF RIGHT HAND: Primary | ICD-10-CM

## 2025-07-18 PROCEDURE — 99212 OFFICE O/P EST SF 10 MIN: CPT | Performed by: PHYSICIAN ASSISTANT

## 2025-07-18 RX ORDER — FERROUS SULFATE 325(65) MG
325 TABLET, DELAYED RELEASE (ENTERIC COATED) ORAL
COMMUNITY

## 2025-07-18 RX ORDER — GARLIC EXTRACT 500 MG
1 CAPSULE ORAL DAILY
COMMUNITY

## 2025-07-18 NOTE — PROGRESS NOTES
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  We discussed cortisone junction versus move forward surgical intervention with a CMC arthroplasty.  Patient is seeing Dr. Spencer for discussion of surgery on his left hip.  I will have the patient see Dr. Stanton following his visit with Dr. Hines to create a game plan regarding surgeries.  All of his questions were answered and he is in agreement with the plan.  Follow Up: As needed    Diagnostic Studies:  X-rays of her right hand reveal evidence of CMC joint arthritis of the thumb.  No no acute fractures occasions performed    Physical Exam:    Ht 5' 7\" (1.702 m)   Wt 196 lb (88.9 kg)   BMI 30.70 kg/m²     Constitutional: NAD. AOx3. Well-developed and Well-nourished.   Psychiatric: Normal mood/ affect/ behavior. Judgment and thought content normal.     Right upper Extremity:   Inspection: Skin Intact. No skin lesions. No gross deformity.   Palpation:  Tender to palpation over the right CMC joint positive relocation test   Motion: Elbow: normal bilateral symmetric ext/flex  Wrist: normal bilateral symmetric ext/flex/sup/pro  Finger: full composite fist  Right MCP hyperextension of 20-25 degrees.        CC: Follow - Up (RT THUMB CMC ARTHRITIS; LAST INJECTION STOPPED WORKING ABOUT 6 MONTHS )        HPI: This 75 year old male presents with complaints of pain to his right thumb.  He states has been ongoing for years but now over the last month it is getting much worse.  He rates it a 3 out of 10.  He wears a brace as needed.  He states he has pain with pinching gripping grasping activities. He received a corticosteroid in the past with improvement in symptoms, with recurrence occurring 2-3 months ago.       Interval history: Patient states he continues to have thumb pain.      Past Medical History:    Abdominal hernia    ALCOHOL USE    1-2 times/wk    Arthritis    Back pain    Bronchitis    Chronic  left-sided low back pain    Diabetes (HCC)    diet controlled - dx 2/2020    Easy bruising    Elevated lipase    Flatulence/gas pain/belching    Gout    Hernia, abdominal    1973 hernia repair. 2001 double hernia repair    Left knee pain    Obesity    Pacemaker    Pneumonia due to organism    Pulmonary emphysema (HCC)    Wears glasses     Past Surgical History:   Procedure Laterality Date    Cardiac pacemaker placement  2018    Cataract      Colonoscopy  2014    Colonoscopy N/A 10/08/2021    Procedure: COLONOSCOPY with cold snare polypectomy;  Surgeon: Stewart Tolentino MD;  Location:  ENDOSCOPY    Colonoscopy N/A 12/13/2024    Procedure: COLONOSCOPY with cold snare polypectomy;  Surgeon: Stewart Tolentino MD;  Location:  ENDOSCOPY    Dental surgery procedure  2009    dental implants    Hernia surgery Right 1973    Hernia surgery Bilateral 2001    Lasik Bilateral 2003    Other surgical history      Dental Implants 6 years ago    Pacemaker/defibrillator      2017    Reconstr nose+abhinav septal repair  06/2005 Polyps and deviated septum    Repair ing hernia,5+y/o,reducibl      Repair of nasal septum N/A 2003    Tonsillectomy  1950's    Vasectomy  1978     Current Outpatient Medications   Medication Sig Dispense Refill    ferrous sulfate 325 (65 FE) MG Oral Tab EC Take 1 tablet (325 mg total) by mouth daily with breakfast.      acidophilus-pectin Oral Cap Take 1 capsule by mouth daily.      Triamterene-HCTZ 37.5-25 MG Oral Tab Take 1 tablet by mouth daily. 90 tablet 1    ipratropium 0.06 % Nasal Solution       rosuvastatin 10 MG Oral Tab TAKE 1 TABLET(10 MG) BY MOUTH EVERY NIGHT 90 tablet 3    brimonidine 0.2 % Ophthalmic Solution Place 1 drop into the right eye Q12H.      latanoprost 0.005 % Ophthalmic Solution Place 1 drop into both eyes nightly.      Multiple Vitamins-Minerals (PRESERVISION AREDS 2) Oral Cap take two tablets by mouth daily       No Known Allergies  Family History   Problem Relation Age of Onset     Other (Other) Father         dementia    Other (Other) Mother         COPD    Asthma Mother     Pulmonary Disease Mother      Social History     Occupational History    Not on file   Tobacco Use    Smoking status: Former     Current packs/day: 0.00     Average packs/day: 1 pack/day for 52.8 years (52.8 ttl pk-yrs)     Types: Cigarettes     Start date: 1965     Quit date: 10/30/2017     Years since quittin.7     Passive exposure: Past (pt's parents both smoked in the home)    Smokeless tobacco: Never    Tobacco comments:     Updated 2/3/25   Vaping Use    Vaping status: Never Used   Substance and Sexual Activity    Alcohol use: Yes     Alcohol/week: 1.0 standard drink of alcohol     Types: 1 Standard drinks or equivalent per week     Comment: 1 drink weekly    Drug use: Never    Sexual activity: Not on file        Review of Systems (negative unless bolded):  General: fevers, chills, fatigue  CV:  chest pain, palpitations, leg swelling  Msk: bodyaches, neck pain, neck stiffness  Skin: rashes, open wounds, nonhealing ulcers  Hem: bleeds easily, bruise easily, immunocompromised  Neuro: dizziness, light headedness, headaches  Psych: anxious, depressed, anger issues  Ivelisse Cates PA-C  Hand, Wrist, & Elbow Surgery  Physician Assistant to Dr. Jose [email or web address]  t: [phone]  f: [phone]

## 2025-07-25 ENCOUNTER — APPOINTMENT (OUTPATIENT)
Dept: PHYSICAL THERAPY | Facility: HOSPITAL | Age: 75
End: 2025-07-25
Attending: INTERNAL MEDICINE

## 2025-08-01 ENCOUNTER — HOSPITAL ENCOUNTER (OUTPATIENT)
Dept: GENERAL RADIOLOGY | Age: 75
Discharge: HOME | End: 2025-08-01
Attending: ORTHOPAEDIC SURGERY

## 2025-08-01 ENCOUNTER — OFFICE VISIT (OUTPATIENT)
Dept: ORTHOPEDICS CLINIC | Facility: CLINIC | Age: 75
End: 2025-08-01

## 2025-08-01 VITALS — WEIGHT: 200.38 LBS | BODY MASS INDEX: 30.37 KG/M2 | HEIGHT: 68 IN

## 2025-08-01 DIAGNOSIS — M16.12 PRIMARY OSTEOARTHRITIS OF LEFT HIP: Primary | ICD-10-CM

## 2025-08-01 DIAGNOSIS — M48.062 SPINAL STENOSIS OF LUMBAR REGION WITH NEUROGENIC CLAUDICATION: ICD-10-CM

## 2025-08-01 DIAGNOSIS — M16.12 PRIMARY OSTEOARTHRITIS OF LEFT HIP: ICD-10-CM

## 2025-08-01 PROCEDURE — 99214 OFFICE O/P EST MOD 30 MIN: CPT | Performed by: ORTHOPAEDIC SURGERY

## 2025-08-01 PROCEDURE — 73502 X-RAY EXAM HIP UNI 2-3 VIEWS: CPT | Performed by: ORTHOPAEDIC SURGERY

## 2025-08-01 PROCEDURE — 72220 X-RAY EXAM SACRUM TAILBONE: CPT | Performed by: ORTHOPAEDIC SURGERY

## 2025-08-04 ENCOUNTER — OFFICE VISIT (OUTPATIENT)
Dept: ORTHOPEDICS CLINIC | Facility: CLINIC | Age: 75
End: 2025-08-04

## 2025-08-04 ENCOUNTER — TELEPHONE (OUTPATIENT)
Dept: INTERNAL MEDICINE CLINIC | Facility: CLINIC | Age: 75
End: 2025-08-04

## 2025-08-04 ENCOUNTER — APPOINTMENT (OUTPATIENT)
Dept: PHYSICAL THERAPY | Facility: HOSPITAL | Age: 75
End: 2025-08-04
Attending: INTERNAL MEDICINE

## 2025-08-04 VITALS — BODY MASS INDEX: 30.31 KG/M2 | HEIGHT: 68 IN | WEIGHT: 200 LBS

## 2025-08-04 DIAGNOSIS — M18.11 PRIMARY OSTEOARTHRITIS OF FIRST CARPOMETACARPAL JOINT OF RIGHT HAND: Primary | ICD-10-CM

## 2025-08-04 PROCEDURE — 99214 OFFICE O/P EST MOD 30 MIN: CPT | Performed by: ORTHOPAEDIC SURGERY

## 2025-08-05 ENCOUNTER — TELEPHONE (OUTPATIENT)
Dept: INTERNAL MEDICINE CLINIC | Facility: CLINIC | Age: 75
End: 2025-08-05

## 2025-08-06 ENCOUNTER — PATIENT MESSAGE (OUTPATIENT)
Dept: INTERNAL MEDICINE CLINIC | Facility: CLINIC | Age: 75
End: 2025-08-06

## 2025-08-06 DIAGNOSIS — M25.59 PAIN IN OTHER SPECIFIED JOINT: ICD-10-CM

## 2025-08-06 DIAGNOSIS — E11.9 TYPE 2 DIABETES MELLITUS WITHOUT COMPLICATION, WITHOUT LONG-TERM CURRENT USE OF INSULIN (HCC): ICD-10-CM

## 2025-08-06 DIAGNOSIS — M16.12 PRIMARY OSTEOARTHRITIS OF LEFT HIP: ICD-10-CM

## 2025-08-06 DIAGNOSIS — Z01.818 PRE-OP TESTING: Primary | ICD-10-CM

## 2025-08-19 ENCOUNTER — PATIENT MESSAGE (OUTPATIENT)
Dept: INTERNAL MEDICINE CLINIC | Facility: CLINIC | Age: 75
End: 2025-08-19

## 2025-08-21 ENCOUNTER — APPOINTMENT (OUTPATIENT)
Dept: CT IMAGING | Age: 75
End: 2025-08-21
Attending: EMERGENCY MEDICINE

## 2025-08-21 ENCOUNTER — HOSPITAL ENCOUNTER (EMERGENCY)
Age: 75
Discharge: HOME | End: 2025-08-21
Attending: EMERGENCY MEDICINE

## 2025-08-21 VITALS
BODY MASS INDEX: 30.61 KG/M2 | DIASTOLIC BLOOD PRESSURE: 77 MMHG | TEMPERATURE: 98 F | RESPIRATION RATE: 16 BRPM | SYSTOLIC BLOOD PRESSURE: 150 MMHG | WEIGHT: 195 LBS | HEIGHT: 67 IN | OXYGEN SATURATION: 100 % | HEART RATE: 68 BPM

## 2025-08-21 DIAGNOSIS — R42 DIZZINESS: ICD-10-CM

## 2025-08-21 DIAGNOSIS — R59.0 HILAR LYMPHADENOPATHY: Primary | ICD-10-CM

## 2025-08-21 LAB
ALBUMIN SERPL-MCNC: 4.2 G/DL (ref 3.2–4.8)
ALBUMIN/GLOB SERPL: 1.9 (ref 1–2)
ALP LIVER SERPL-CCNC: 58 U/L (ref 45–117)
ALT SERPL-CCNC: <7 U/L (ref 10–49)
ANION GAP SERPL CALC-SCNC: 8 MMOL/L (ref 0–18)
AST SERPL-CCNC: 17 U/L (ref ?–34)
BASOPHILS # BLD AUTO: 0.04 X10(3) UL (ref 0–0.2)
BASOPHILS NFR BLD AUTO: 0.4 %
BILIRUB SERPL-MCNC: 0.5 MG/DL (ref 0.2–1.1)
BUN BLD-MCNC: 17 MG/DL (ref 9–23)
CALCIUM BLD-MCNC: 8.7 MG/DL (ref 8.7–10.6)
CHLORIDE SERPL-SCNC: 105 MMOL/L (ref 98–112)
CO2 SERPL-SCNC: 27 MMOL/L (ref 21–32)
CREAT BLD-MCNC: 1.07 MG/DL (ref 0.7–1.3)
EGFRCR SERPLBLD CKD-EPI 2021: 71 ML/MIN/1.73M2 (ref 60–?)
EOSINOPHIL # BLD AUTO: 0.19 X10(3) UL (ref 0–0.7)
EOSINOPHIL NFR BLD AUTO: 2.1 %
ERYTHROCYTE [DISTWIDTH] IN BLOOD BY AUTOMATED COUNT: 17 %
GLOBULIN PLAS-MCNC: 2.2 G/DL (ref 2–3.5)
GLUCOSE BLD-MCNC: 98 MG/DL (ref 70–99)
HCT VFR BLD AUTO: 36.3 % (ref 39–53)
HGB BLD-MCNC: 11.7 G/DL (ref 13–17.5)
IMM GRANULOCYTES # BLD AUTO: 0.04 X10(3) UL (ref 0–1)
IMM GRANULOCYTES NFR BLD: 0.4 %
LYMPHOCYTES # BLD AUTO: 2.34 X10(3) UL (ref 1–4)
LYMPHOCYTES NFR BLD AUTO: 25.9 %
MCH RBC QN AUTO: 26 PG (ref 26–34)
MCHC RBC AUTO-ENTMCNC: 32.2 G/DL (ref 31–37)
MCV RBC AUTO: 80.7 FL (ref 80–100)
MONOCYTES # BLD AUTO: 0.84 X10(3) UL (ref 0.1–1)
MONOCYTES NFR BLD AUTO: 9.3 %
NEUTROPHILS # BLD AUTO: 5.6 X10 (3) UL (ref 1.5–7.7)
NEUTROPHILS # BLD AUTO: 5.6 X10(3) UL (ref 1.5–7.7)
NEUTROPHILS NFR BLD AUTO: 61.9 %
OSMOLALITY SERPL CALC.SUM OF ELEC: 292 MOSM/KG (ref 275–295)
PLATELET # BLD AUTO: 206 10(3)UL (ref 150–450)
POTASSIUM SERPL-SCNC: 4 MMOL/L (ref 3.5–5.1)
PROT SERPL-MCNC: 6.4 G/DL (ref 5.7–8.2)
RBC # BLD AUTO: 4.5 X10(6)UL (ref 3.8–5.8)
SODIUM SERPL-SCNC: 140 MMOL/L (ref 136–145)
TROPONIN I SERPL HS-MCNC: 4 NG/L (ref ?–53)
WBC # BLD AUTO: 9.1 X10(3) UL (ref 4–11)

## 2025-08-21 PROCEDURE — 99285 EMERGENCY DEPT VISIT HI MDM: CPT

## 2025-08-21 PROCEDURE — 84484 ASSAY OF TROPONIN QUANT: CPT | Performed by: EMERGENCY MEDICINE

## 2025-08-21 PROCEDURE — 70498 CT ANGIOGRAPHY NECK: CPT | Performed by: EMERGENCY MEDICINE

## 2025-08-21 PROCEDURE — 85025 COMPLETE CBC W/AUTO DIFF WBC: CPT | Performed by: EMERGENCY MEDICINE

## 2025-08-21 PROCEDURE — 36415 COLL VENOUS BLD VENIPUNCTURE: CPT

## 2025-08-21 PROCEDURE — 70450 CT HEAD/BRAIN W/O DYE: CPT | Performed by: EMERGENCY MEDICINE

## 2025-08-21 PROCEDURE — 80053 COMPREHEN METABOLIC PANEL: CPT | Performed by: EMERGENCY MEDICINE

## 2025-08-21 PROCEDURE — 93005 ELECTROCARDIOGRAM TRACING: CPT

## 2025-08-21 PROCEDURE — 70496 CT ANGIOGRAPHY HEAD: CPT | Performed by: EMERGENCY MEDICINE

## 2025-08-21 PROCEDURE — 93010 ELECTROCARDIOGRAM REPORT: CPT

## 2025-08-21 RX ORDER — MECLIZINE HYDROCHLORIDE 25 MG/1
25 TABLET ORAL 3 TIMES DAILY PRN
Qty: 21 TABLET | Refills: 0 | Status: SHIPPED | OUTPATIENT
Start: 2025-08-21 | End: 2025-08-28

## 2025-08-21 RX ORDER — MECLIZINE HYDROCHLORIDE 25 MG/1
25 TABLET ORAL ONCE
Status: COMPLETED | OUTPATIENT
Start: 2025-08-21 | End: 2025-08-21

## 2025-08-22 ENCOUNTER — PATIENT MESSAGE (OUTPATIENT)
Dept: INTERNAL MEDICINE CLINIC | Facility: CLINIC | Age: 75
End: 2025-08-22

## 2025-08-22 ENCOUNTER — PATIENT OUTREACH (OUTPATIENT)
Dept: CASE MANAGEMENT | Age: 75
End: 2025-08-22

## 2025-08-22 ENCOUNTER — PATIENT OUTREACH (OUTPATIENT)
Age: 75
End: 2025-08-22

## 2025-08-22 LAB
ATRIAL RATE: 68 BPM
P-R INTERVAL: 222 MS
Q-T INTERVAL: 464 MS
QRS DURATION: 182 MS
QTC CALCULATION (BEZET): 493 MS
R AXIS: -81 DEGREES
T AXIS: 81 DEGREES
VENTRICULAR RATE: 68 BPM

## 2025-08-23 ENCOUNTER — PATIENT MESSAGE (OUTPATIENT)
Facility: CLINIC | Age: 75
End: 2025-08-23

## 2025-08-23 DIAGNOSIS — R59.0 HILAR ADENOPATHY: ICD-10-CM

## 2025-08-23 DIAGNOSIS — Z87.891 HISTORY OF TOBACCO USE: Primary | ICD-10-CM

## 2025-08-28 ENCOUNTER — PATIENT OUTREACH (OUTPATIENT)
Age: 75
End: 2025-08-28

## (undated) DEVICE — SKIN REG/FINE DUAL MARKER, RULER, LABELS: Brand: MEDLINE

## (undated) DEVICE — DRAIN SILICONE ROUND 1/8X49

## (undated) DEVICE — 1200CC GUARDIAN II: Brand: GUARDIAN

## (undated) DEVICE — GLOVE SUR 6.5 SENSICARE PIP WHT PWD F

## (undated) DEVICE — MINI LAP PACK-LF: Brand: MEDLINE INDUSTRIES, INC.

## (undated) DEVICE — 10FT COMBINED O2 DELIVERY/CO2 MONITORING. FILTER WITH MICROSTREAM TYPE LUER: Brand: DUAL ADULT NASAL CANNULA

## (undated) DEVICE — SUT VICRYL 3-0 SH J416H

## (undated) DEVICE — SOLUTION  .9 1000ML BTL

## (undated) DEVICE — 3M™ RED DOT™ MONITORING ELECTRODE WITH FOAM TAPE AND STICKY GEL, 50/BAG, 20/CASE, 72/PLT 2570: Brand: RED DOT™

## (undated) DEVICE — ENDOSCOPY PACK - LOWER: Brand: MEDLINE INDUSTRIES, INC.

## (undated) DEVICE — LASSO POLYPECTOMY SNARE: Brand: LASSO

## (undated) DEVICE — V2 SPECIMEN COLLECTION MANIFOLD KIT: Brand: NEPTUNE

## (undated) DEVICE — SUT CHROMIC GUT 3-0 SH G122H

## (undated) DEVICE — BANDAGE ADH 1INX3IN NAT FAB N ADH PD CURAD

## (undated) DEVICE — REMOVER LOT 4OZ N IRRIG UNSCNT SFT MOIST LIQ

## (undated) DEVICE — AVANOS* TUOHY EPIDURAL NEEDLE: Brand: AVANOS

## (undated) DEVICE — GLOVE,SURG,SENSICARE,ALOE,LF,PF,7: Brand: MEDLINE

## (undated) DEVICE — FILTERLINE NASAL ADULT O2/CO2

## (undated) DEVICE — KIT CUSTOM ENDOPROCEDURE STERIS

## (undated) DEVICE — UNDYED BRAIDED (POLYGLACTIN 910), SYNTHETIC ABSORBABLE SUTURE: Brand: COATED VICRYL

## (undated) DEVICE — SYRINGE 10ML SLIP TIP LOSS OF RESIST PLAS

## (undated) DEVICE — STERILE POLYISOPRENE POWDER-FREE SURGICAL GLOVES: Brand: PROTEXIS

## (undated) DEVICE — PAIN TRAY: Brand: MEDLINE INDUSTRIES, INC.

## (undated) DEVICE — SUT CHROMIC GUT 4-0 SH G121H

## (undated) DEVICE — SUT VICRYL 3-0 J110T

## (undated) DEVICE — SLEEVE KENDALL SCD EXPRESS MED

## (undated) DEVICE — SUT SILK 0 A186H

## (undated) DEVICE — SUT CHROMIC GUT 3-0 FS-2 636H

## (undated) DEVICE — Device: Brand: DEFENDO AIR/WATER/SUCTION AND BIOPSY VALVE

## (undated) DEVICE — SUT CHROMIC GUT 4-0 FS-2 635H

## (undated) DEVICE — 3M(TM) TEGADERM(TM) TRANSPARENT FILM DRESSING FRAME STYLE 9505W: Brand: 3M™ TEGADERM™

## (undated) DEVICE — MEGADYNE ELECTRODE ADULT PT RT

## (undated) DEVICE — SUT SILK 2-0 FS 685G

## (undated) DEVICE — SNARE 9MM 230CM 2.4MM EXACTO

## (undated) DEVICE — SUT MONOCRYL 4-0 PS-2 Y496G

## (undated) DEVICE — SUPER SPONGES,MEDIUM: Brand: KERLIX

## (undated) DEVICE — KIT VLV 5 PC AIR H2O SUCT BX ENDOGATOR CONN

## (undated) DEVICE — SUPPORTER ATHL LG LG SPNS SPRT

## (undated) DEVICE — TRAP 4 CPTR CHMBR N EZ INLN

## (undated) DEVICE — PREMIUM WET SKIN PREP TRAY: Brand: MEDLINE INDUSTRIES, INC.

## (undated) DEVICE — EVACUATOR URO RELIVAC 100CC

## (undated) DEVICE — EXTENSION SET, MALE LUER LOCK ADAPTER

## (undated) NOTE — ED AVS SNAPSHOT
Francheska Collier   MRN: [id number]    Department:  THE [facility] Emergency Department in [place]   Date of Visit:  3/1/2019           Disclosure     Insurance plans vary and the physician(s) referred by the ER may not be covered by your plan.  Please contact tell this physician (or your personal doctor if your instructions are to return to your personal doctor) about any new or lasting problems. The primary care or specialist physician will see patients referred from the [facility] Emergency Department.  Kris Mccauley

## (undated) NOTE — LETTER
04/29/19        [facility] [address]  [place] [facility] [address]      Dear Marcelle Maravilla,    [address] [facility] records indicate that you have outstanding lab work and or testing that was ordered for you and has not yet been completed: CT Scan of Lungs - Pleas

## (undated) NOTE — Clinical Note
Initial assessment completed with patient.  Pt states that he will follow up with Ortho.Patient declined the need for additional follow-up calls from nurse care manager.  Thank you!

## (undated) NOTE — LETTER
10/19/20        Ayse Craig [address]  [address] [facility] [address]      Dear Marcelle Maravilla,    [address] [facility] records indicate that you have outstanding lab work and or testing that was ordered for you and has not yet been completed:  Orders Placed This Encounter

## (undated) NOTE — LETTER
Laci Woody M.D., F.A.C.S. Surgical Clearance Needed    Date: 6/21/2022                                                                       From: Anuel Smalls      Attn: DR. Krista Larios MD                                                                                    RE: Surgical Clearance               # of Pages: 1        Patient Name: Chung Beauchamp    Patient YOB: 1950    Consult For: CARDIAC CLEARANCE     Surgery: RIGHT INGUINAL HERNIA REPAIR WITH MESH OPEN    Date of Surgery: 10/7/2022        This facsimile transmission is provided for your internal use only. If the reader of this message is not the intended recipient, you are hereby notified that you have received this document in error, and that any review, dissemination, distribution, or copying of this message is strictly prohibited. If you have received this communication in error, please notify us immediately by telephone and return the original message to us by mail.

## (undated) NOTE — LETTER
KAYLAN. Notifier: [employer]. Patient Name: Nasir Beauchamp Identification Number: [id number]                          Advance Beneficiary Notice of Noncoverage (ABN)   NOTE:  If Medicare doesn’t pay for D. item/service(s) below, you may have to pay.  Medicare does not pay for everything, even some care that you or your health care provider have good reason to think you need. We expect Medicare may not pay for the D. item/service(s) below.  D. Items or Services  Left hip CSI with USG local anesthesia  E. Reason Medicare May Not Pay: F. Estimated Cost   __ EKG ($87.00)  __ Pap smear ($101) __Pelvic/Breast ($147.00)  __ Ear Irrigation ($138)  _x_ Injection(s)  ___ Tdap ($181)       ___ Meningitis ($290)   __Prevnar ($555)  ___ Td ($66)              ___ Prevnar 20 ($549)  ___ Hep A ($152)     ___ Prolia ($1827)         __ Xiaflex ($            )   ___ Hep B ($150)     ___ Pneumovax ($287)                                         ___ Vaccine Administration ($65)   __ Medicare does not cover this service      __ Medicare may not pay for this   item/service for your condition     __ Medicare may not pay for this item/service as often as this        WHAT YOU NEED TO DO NOW:  Read this notice, so you can make an informed decision about your care.  Ask us any questions that you may have after you finish reading.  Choose an option below about whether to receive the D. item/service(s) listed above.  Note: If you choose Option 1 or 2, we may help you to use any other insurance that you might have, but Medicare cannot require us to do this.  G. OPTIONS: Check only one box.  We cannot choose a box for you.   OPTION 1. I want the D. item/service(s) listed above. You may ask to be paid now, but I also want Medicare billed for an official decision on payment, which is sent to me on a Medicare Summary Notice (MSN). I understand that if Medicare doesn’t pay, I am responsible for payment, but I can appeal to Medicare by  following the directions on the MSN. If Medicare does pay, you will refund any payments I made to you, less co-pays or deductibles.  OPTION 2. I want the D. item/service(s) listed above, but do not bill Medicare. You may ask to be paid now as I am responsible for payment. I cannot appeal if Medicare is not billed.  OPTION 3. I don't want the D. item/service(s) listed above. I understand with this choice I am not responsible for payment, and I cannot appeal to see if Medicare would pay.    H. Additional Information:    This notice gives our opinion, not an official Medicare decision. If you have other questions on this notice or Medicare billing, call 1-800-MEDICARE (1-354.601.1229/TTY: [phone]). Signing below means that you have received and understand this notice. You also receive a copy.  I. Signature: J. Date:       You have the right to get Medicare information in an accessible format, like large print, Braille, or audio. You also have the right to file a complaint if you feel you’ve been discriminated against. Visit Medicare.gov/about- us/usuxspvdylepq-pcuxekhvnjblxmehh-ynkehk.  According to the Paperwork Reduction Act of 1995, no persons are required to respond to a collection of information unless it displays a valid OMB control number. The valid OMB control number for this information collection is 5781-8677. The time required to complete this information collection is estimated to average 7 minutes per response, including the time to review instructions, search existing data resources, gather the data needed, and complete and review the information collection. If you have comments concerning the accuracy of the time estimate or suggestions for improving this form, please write to: CMS, [facility] Security     Dani Attn: JACKIE Reports Clearance Officer, [place], [place] [address].  Form CMS-R-131 (Exp. 1/31/2026) Form Approved OMB No. [id number]

## (undated) NOTE — LETTER
07/31/20        Clovis M[address]ra [address]  [address] [facility]      Dear SOLEDAD,    [address] [facility] records indicate that you have outstanding lab work and or testing that was ordered for you and has not yet been completed:      CT LUNG LD SCREENING(CPT

## (undated) NOTE — LETTER
AUTHORIZATION FOR SURGICAL OPERATION OR OTHER PROCEDURE    1. I hereby authorize Dr. Shade Moffett and the [facility] staff assigned to my case to perform the following operation and/or procedure at the [facility]:    Left hip CSI with USG local anesthesia     2.  My physician has explained the nature and purpose of the operation or other procedure, possible alternative methods of treatment, the risks involved, and the possibility of complication to me.  I acknowledge that no guarantee has been made as to the result that may be obtained.  3.  I recognize that, during the course of this operation, or other procedure, unforseen conditions may necessitate additional or different procedure than those listed above.  I, therefore, further authorize and request that the above named physician, his/her physician assistants or designees perform such procedures as are, in his/her professional opinion, necessary and desirable.  4.  Any tissue or organs removed in the operation or other procedure may be disposed of by and at the discretion of the [facility] staff and [facility].  5.  I understand that in the event of a medical emergency, I will be transported by local paramedics to [facility] or other hospital emergency department.  6.  I certify that I have read and fully understand the above consent to operation and/or other procedure.    7.  I acknowledge that my physician has explained sedation/analgesia administration to me including the risks and benefits.  I consent to the administration of sedation/analgesia as may be necessary or desirable in the judgement of my physician.    Witness signature: ___________________________________________________ Date:  ______/______/_____                    Time:  ________ A.M.  P.M.       Patient Name:  Nasir Beauchamp  4/15/1950  [id number]         Patient signature:  ___________________________________________________                 Statement of  Physician  My signature below affirms that prior to the time of the procedure, I have explained to the patient and/or his/her guardian, the risks and benefits involved in the proposed treatment and any reasonable alternative to the proposed treatment.  I have also explained the risks and benefits involved in the refusal of the proposed treatment and have answered the patient's questions.                        Date:  ______/______/_______  Provider                      Signature:  __________________________________________________________       Time:  ___________ A.M    P.M.

## (undated) NOTE — LETTER
Patient Name: Nasir Beauchamp  Surgery Date: 12/13/2024  Medical Record: [id number] CSN: [id number]      Surgeon(s):  Stewart Tolentino MD  Consent Procedure: COLONOSCOPY  Anesthesia Type: MAC    Please follow up with patient for updated Bowel prep instructions before the Colonoscopy.     Thank you    Pre Admissions

## (undated) NOTE — ED AVS SNAPSHOT
Marifer Scherer   MRN: [id number]    Department:  THE [facility] Emergency Department in [place]   Date of Visit:  11/19/2017           Disclosure     Insurance plans vary and the physician(s) referred by the ER may not be covered by your plan.  Please contac If you have been prescribed any medication(s), please fill your prescription right away and begin taking the medication(s) as directed    If the emergency physician has read X-rays, these will be re-interpreted by a radiologist.  If there is a significant

## (undated) NOTE — LETTER
[facility] Cardiac Device Communication Tool    Preop to complete    Patient Name Nasir Beauchamp   Patient  - AGE - SEX 4/15/1950 - A: 74 y - male   Surgical Date 10/12/2022   Surgical Procedure RIGHT SPERMATOCELECTOMY(Dr. Eva Cat)   Surgical Location [facility]   Type of cautery anticipated: No cautery   Prone Position      Device Clinic to Complete the Information Below, Sign and Fax to [phone]    Pacemaker or ICD    Atrial or atrial-ventricular lead?     Indication for device    Is patient pacemaker dependent? Has pt had routine f/u and is battery life > 3 months    Is ICD programmed to inhibit therapy w/magnet? Does device have rate response or other sensor? Surgical Procedure above [facility] Surgical Procedure @ [place] [place] and below   < 6 in. from ICD: Reprogram therapies OFF w/  asynchronous pacing if PM dependent  < 6 in. from PM: Reprogram asynchronous if PM   dependent ICD: No Change  PM: No Change   > 6 in. from ICD: Magnet*  > 6 in. from PM:  No Change*  * If PM dependent observe for pacing inhibition and minimize cautery if inhibition is seen                                              Bipolar cautery: No Change   Cardiac Device Management Plan (check one)            ___  Reprogram (PAT Dept to provide Rep w/ arrival date/time)   ___ Magnet    ___ No Change    Comments: ___________________________________________________________________        Signature: ________________________________ Date: ____________ Time: ______________     Print Name: ___________________________________      This message is intended only for the use of the individual or entity to which it is addressed. It may have been disclosed to you from records whose confidentiality is protected by [employer] and applicable state law. Federal Regulation, 45 C.  Marva Reyes., part 164, prohibits you from making any further disclosure without specific authorization of the person to whom it pertains, or as otherwise permitted by such regulations. If the reader of this message is not the intended recipient, you are hereby notified that reading, disseminating, distributing or copying this communication is strictly prohibited. If you have received this communication in error, please immediately notify us by telephone and return the original message to us at [facility]. 15, [address] via the [facility].